# Patient Record
Sex: MALE | Race: WHITE | NOT HISPANIC OR LATINO | Employment: OTHER | URBAN - METROPOLITAN AREA
[De-identification: names, ages, dates, MRNs, and addresses within clinical notes are randomized per-mention and may not be internally consistent; named-entity substitution may affect disease eponyms.]

---

## 2023-04-13 ENCOUNTER — APPOINTMENT (OUTPATIENT)
Dept: LAB | Facility: LAB | Age: 53
End: 2023-04-13
Payer: COMMERCIAL

## 2023-04-13 LAB — CANCER AG 19-9 (U/ML) IN SER/PLAS: 8 U/ML

## 2023-10-14 PROBLEM — C96.9: Status: ACTIVE | Noted: 2023-04-04

## 2023-10-14 PROBLEM — R07.9 CHEST PAIN: Status: ACTIVE | Noted: 2023-10-14

## 2023-10-14 PROBLEM — E78.00 HIGH CHOLESTEROL: Status: ACTIVE | Noted: 2023-10-14

## 2023-10-14 PROBLEM — C25.9 PANCREAS CANCER (MULTI): Status: ACTIVE | Noted: 2023-10-14

## 2023-10-14 PROBLEM — R63.4 WEIGHT LOSS: Status: ACTIVE | Noted: 2023-10-14

## 2023-10-14 PROBLEM — J45.901 ASTHMA WITH ACUTE EXACERBATION (HHS-HCC): Status: ACTIVE | Noted: 2023-10-14

## 2023-10-14 PROBLEM — C44.92 SCC (SQUAMOUS CELL CARCINOMA): Status: ACTIVE | Noted: 2023-10-14

## 2023-10-14 PROBLEM — D70.1 CHEMOTHERAPY INDUCED NEUTROPENIA (CMS-HCC): Status: ACTIVE | Noted: 2023-10-14

## 2023-10-14 PROBLEM — G89.3 CANCER ASSOCIATED PAIN: Status: ACTIVE | Noted: 2023-04-04

## 2023-10-14 PROBLEM — J43.9 COPD TYPE A (MULTI): Status: ACTIVE | Noted: 2023-10-14

## 2023-10-14 PROBLEM — C78.7 METASTATIC CARCINOMA TO LIVER (MULTI): Status: ACTIVE | Noted: 2022-12-05

## 2023-10-14 PROBLEM — I81 PORTAL VEIN THROMBOSIS: Status: ACTIVE | Noted: 2023-04-04

## 2023-10-14 PROBLEM — C78.7 MALIGNANT NEOPLASM METASTATIC TO LIVER (MULTI): Status: ACTIVE | Noted: 2023-04-04

## 2023-10-14 PROBLEM — C25.1 MALIGNANT NEOPLASM OF BODY OF PANCREAS (MULTI): Status: ACTIVE | Noted: 2023-04-04

## 2023-10-14 PROBLEM — T45.1X5A CHEMOTHERAPY INDUCED NEUTROPENIA (CMS-HCC): Status: ACTIVE | Noted: 2023-10-14

## 2023-10-14 PROBLEM — G56.20 ULNAR NEUROPATHY: Status: ACTIVE | Noted: 2018-01-10

## 2023-10-14 RX ORDER — SELENIUM SULFIDE 2.5 MG/100ML
LOTION TOPICAL
COMMUNITY
Start: 2013-08-23

## 2023-10-14 RX ORDER — DOXYCYCLINE HYCLATE 50 MG/1
1 CAPSULE ORAL
COMMUNITY
Start: 2013-08-23

## 2023-10-14 RX ORDER — LOPERAMIDE HYDROCHLORIDE 2 MG/1
1 CAPSULE ORAL EVERY 4 HOURS PRN
COMMUNITY
Start: 2022-10-16

## 2023-10-14 RX ORDER — PREDNISONE 20 MG/1
3 TABLET ORAL DAILY
COMMUNITY
Start: 2014-02-27

## 2023-10-14 RX ORDER — PROCHLORPERAZINE MALEATE 10 MG
TABLET ORAL EVERY 6 HOURS PRN
COMMUNITY
Start: 2022-11-22

## 2023-10-14 RX ORDER — SIMVASTATIN 40 MG/1
TABLET, FILM COATED ORAL
COMMUNITY
Start: 2013-05-08

## 2023-10-14 RX ORDER — FLUTICASONE PROPIONATE AND SALMETEROL 250; 50 UG/1; UG/1
2 POWDER RESPIRATORY (INHALATION) DAILY
COMMUNITY
Start: 2022-09-29

## 2023-10-14 RX ORDER — ALBUTEROL SULFATE 90 UG/1
1 AEROSOL, METERED RESPIRATORY (INHALATION) 3 TIMES DAILY PRN
COMMUNITY

## 2023-10-14 RX ORDER — METRONIDAZOLE 500 MG/1
TABLET ORAL
COMMUNITY
Start: 2022-10-27

## 2023-10-14 RX ORDER — ADAPALENE AND BENZOYL PEROXIDE 3; 25 MG/G; MG/G
1 GEL TOPICAL DAILY
COMMUNITY
Start: 2023-02-07

## 2023-10-14 RX ORDER — FLUTICASONE PROPIONATE 50 MCG
SPRAY, SUSPENSION (ML) NASAL
COMMUNITY
Start: 2012-10-03

## 2023-10-14 RX ORDER — FLUTICASONE PROPIONATE AND SALMETEROL 50; 250 UG/1; UG/1
1 POWDER RESPIRATORY (INHALATION)
COMMUNITY

## 2023-10-14 RX ORDER — OMEPRAZOLE 20 MG/1
CAPSULE, DELAYED RELEASE ORAL
COMMUNITY
Start: 2012-11-15

## 2023-10-14 RX ORDER — HYDROXYZINE HYDROCHLORIDE 25 MG/1
1 TABLET, FILM COATED ORAL DAILY
COMMUNITY

## 2023-10-14 RX ORDER — APIXABAN 5 MG/1
1 TABLET, FILM COATED ORAL 2 TIMES DAILY
COMMUNITY

## 2023-10-14 RX ORDER — MINOCYCLINE HYDROCHLORIDE 50 MG/1
1 CAPSULE ORAL
COMMUNITY
Start: 2016-07-05

## 2023-10-14 RX ORDER — LISINOPRIL 5 MG/1
1 TABLET ORAL DAILY
COMMUNITY

## 2023-10-14 RX ORDER — MINERAL OIL
1 ENEMA (ML) RECTAL DAILY
COMMUNITY

## 2023-10-14 RX ORDER — ROSUVASTATIN CALCIUM 40 MG/1
1 TABLET, COATED ORAL DAILY
COMMUNITY

## 2023-10-14 RX ORDER — FENOFIBRATE 160 MG/1
1 TABLET ORAL DAILY
COMMUNITY

## 2023-10-14 RX ORDER — ESOMEPRAZOLE MAGNESIUM 40 MG/1
1 CAPSULE, DELAYED RELEASE ORAL DAILY
COMMUNITY

## 2023-10-16 ENCOUNTER — TELEMEDICINE (OUTPATIENT)
Dept: HEMATOLOGY/ONCOLOGY | Facility: HOSPITAL | Age: 53
End: 2023-10-16
Payer: COMMERCIAL

## 2023-10-16 DIAGNOSIS — C25.9 MALIGNANT NEOPLASM OF PANCREAS, UNSPECIFIED LOCATION OF MALIGNANCY (MULTI): Primary | ICD-10-CM

## 2023-10-16 PROCEDURE — 99213 OFFICE O/P EST LOW 20 MIN: CPT | Mod: 95 | Performed by: STUDENT IN AN ORGANIZED HEALTH CARE EDUCATION/TRAINING PROGRAM

## 2023-10-16 PROCEDURE — 99213 OFFICE O/P EST LOW 20 MIN: CPT | Performed by: STUDENT IN AN ORGANIZED HEALTH CARE EDUCATION/TRAINING PROGRAM

## 2023-10-25 NOTE — PROGRESS NOTES
Cancer History:  DIAGNOSIS: Pancreas cancer     STAGING: IV     CURRENT SITES OF DISEASE:  Pancreas, liver     MOLECULAR/GENOMICS:     CURRENT THERAPY:  FUDR via ABEL pump; planning to start Gemcitabine + Abraxane locally with Dr. Conway.       PREVIOUS THERAPY:  10/5/22--10/19/22: s/p 2 cycles FOLFIRINOX.  Discontinued due to intractable nausea vomiting diarrhea and colitis  11/16/22 --May 2023: Gemcitabine plus Abraxane day 1 day 15  7/10/23: S/p distal pancreatectomy + placement of Medtronic pump for ABEL      TUMOR MARKER:     9/2022: 661  11/2022: 101 (after 2 cycles FOLFIRINOX)     ONCOLOGIC ISSUES:     PROVIDERS:  Primary medical oncologist: Dr. Abram Tolentino at [facility]  Surgical oncologist: Dr. Norberto Lester     HISTORY:   8/2022: Developed mid epigastric pain and discomfort  9/2/22: CT abdomen pelvis without contrast showed mass in the pancreatic head measuring 2.2 cm with suspicious numerous lesions in the liver and a peripancreatic lymph node.    9/15/2022: MRI abdomen confirmed mass in pancreatic body, dilation of pancreatic duct distal to the mass, liver showed numerous lesions.  9/23/2022: Underwent EUS with biopsy, FNA showed adenocarcinoma of the liver and of the pancreas mass consistent with pancreatic primary.     PMH: HLD     FH: No family history of malignancy     SH: , lives with wife, works full-time.  No tobacco, EtOH, illicit     History of Present Illness:   Was hospitalized in [place] for bleeding in his abdomen, they removed a lot of blood while in [place], then they filled his pump wiht heparin saline x1 month, continues to deal with healing from the abscess in his abdomen currently with a drain placed. He is planned for chemotherapy locally with Dr. Conway on 10/30/23 if all goes well and is also scheduled at [place] for FUDR 10/23.      No fevers, chills, chest pain, shortness of breath, abdominal pain, nausea, vomiting, rashes or masses.      ROS as above.  Remainder of 10 point review of systems elicited and otherwise unremarkable.    Objective:  There were no vitals taken for this visit.     Physical exam deferred d/t virtual encounter    ECOG Performance Status: 1    Assessment & Plan:  Mr. Foley is a 52yoM with metastatic pancreatic adenocarcinoma to the liver diagnosed in September 2022.  He presents today for discussion of hepatic artery infusion  pump therapy for his liver metastases.     I extensively reviewed his excellent care by my colleague at [facility].  He was referred to me today by Dr. Lester.  Briefly, the patient was diagnosed in September 2022 with Denovo metastatic disease to the liver.  I have personally reviewed  the images of his prior MRI, which show multiple small masses throughout his liver.  He received 2 cycles of frontline chemotherapy with FOLFIRINOX, which were unfortunately complicated by colitis necessitating hospitalization.  Of note, there seems to  been a response to FOLFIRINOX as his baseline CA 19-9 of 661 down trended to 101.  His chemotherapy regimen was changed to gemcitabine plus Abraxane, which he is overall tolerating well on a day 1 day 15 schedule.  I have personally reviewed the images  of his recent PET CT and MRI liver, which show excellent response to lesions in his liver and minimal residual activity in the pancreas.     I discussed with the patient and his wife that hepatic artery infusion pump therapy is not a standard treatment for metastatic pancreatic adenocarcinoma, because in general this disease is very aggressive and time off of chemotherapy for surgical placement  of pump and resection of pancreas tumor will likely result in progression of disease.  Additionally, it is not well-established if intrahepatic chemotherapy is effective for this disease.  There is an ongoing clinical trial in [place] specifically for  hepatic artery infusion pump therapy for patients with metastatic pancreatic  adenocarcinoma to the liver, which could be considered if he is interested.     He recently met wiht Dr. Urbina in [place] about the clinical trial, and he would like to proceed with this. I have disucssed the option o fhaving systemic chemotherapy & heparin saline done here locally, and I am more than happy to manage this, but  we need to work with the clinical trial team at [place] to be sure this does not violate the protocol. I will keep working with the team for this.      7/24/23: Underwent distal pancreatectomy on 7/10/23 wt Dr. Azael Urbina in [place], and had placement of pump for ABEL chemotherapy at the same time. Based on the patient's description, a Medtronic pump was placed, which unfortunately requires a different  set up for access and programming than the Intera pump. I was not aware prior to his surgery that a Medtronic would be placed, and I will have to sort out whether or not there is a department here that has a Medtronic set up for other indications that  I could coordinate this with.      9/25/23: Recently admitted for prolonged period with intra-abd hematoma. Most recently had this drained at [place]. He is scheduled for FUDR via Medtronic ABEL Pump at [place] on 10/9/23 and is hoping to get his pump emptied/refilled with saline on 10/23  here. I am not sure that we will have the capability to manage his Medtronic pump, as we are not set up for this. He would like his local oncologist Dr. Conway at [facility] to manage his gem/Abraxane.     10/16/23: continues to have drain in abdominal abscess. Scheduled for FUDR via ABEL on 10/23 in [place] and gem/Abraxane on 10/30 at [facility] with Dr. Conway.  RTC approx 1 mo virtual visit

## 2023-12-29 ENCOUNTER — HOSPITAL ENCOUNTER (OUTPATIENT)
Dept: RADIOLOGY | Facility: EXTERNAL LOCATION | Age: 53
Discharge: HOME | End: 2023-12-29

## 2023-12-29 ENCOUNTER — ANCILLARY PROCEDURE (OUTPATIENT)
Dept: RADIOLOGY | Facility: CLINIC | Age: 53
End: 2023-12-29
Payer: COMMERCIAL

## 2023-12-29 DIAGNOSIS — R07.81 RIB PAIN: ICD-10-CM

## 2023-12-29 DIAGNOSIS — R07.82 INTERCOSTAL PAIN: ICD-10-CM

## 2023-12-29 PROCEDURE — 71111 X-RAY EXAM RIBS/CHEST4/> VWS: CPT | Performed by: STUDENT IN AN ORGANIZED HEALTH CARE EDUCATION/TRAINING PROGRAM

## 2023-12-29 PROCEDURE — 71111 X-RAY EXAM RIBS/CHEST4/> VWS: CPT

## 2024-01-13 ENCOUNTER — APPOINTMENT (OUTPATIENT)
Dept: RADIOLOGY | Facility: HOSPITAL | Age: 54
DRG: 271 | End: 2024-01-13
Payer: COMMERCIAL

## 2024-01-13 ENCOUNTER — HOSPITAL ENCOUNTER (INPATIENT)
Facility: HOSPITAL | Age: 54
LOS: 3 days | Discharge: HOME | DRG: 271 | End: 2024-01-16
Attending: SURGERY | Admitting: SURGERY
Payer: COMMERCIAL

## 2024-01-13 DIAGNOSIS — C25.9 MALIGNANT NEOPLASM OF PANCREAS, UNSPECIFIED LOCATION OF MALIGNANCY (MULTI): Primary | ICD-10-CM

## 2024-01-13 DIAGNOSIS — R10.9 ABDOMINAL PAIN, UNSPECIFIED ABDOMINAL LOCATION: ICD-10-CM

## 2024-01-13 LAB
ALBUMIN SERPL BCP-MCNC: 3.4 G/DL (ref 3.4–5)
ALP SERPL-CCNC: 76 U/L (ref 33–120)
ALT SERPL W P-5'-P-CCNC: 19 U/L (ref 10–52)
ANION GAP SERPL CALC-SCNC: 15 MMOL/L (ref 10–20)
AST SERPL W P-5'-P-CCNC: 23 U/L (ref 9–39)
BILIRUB SERPL-MCNC: 0.5 MG/DL (ref 0–1.2)
BUN SERPL-MCNC: 6 MG/DL (ref 6–23)
CALCIUM SERPL-MCNC: 9.2 MG/DL (ref 8.6–10.6)
CHLORIDE SERPL-SCNC: 105 MMOL/L (ref 98–107)
CO2 SERPL-SCNC: 24 MMOL/L (ref 21–32)
CREAT SERPL-MCNC: 0.56 MG/DL (ref 0.5–1.3)
EGFRCR SERPLBLD CKD-EPI 2021: >90 ML/MIN/1.73M*2
ERYTHROCYTE [DISTWIDTH] IN BLOOD BY AUTOMATED COUNT: 19 % (ref 11.5–14.5)
GLUCOSE BLD MANUAL STRIP-MCNC: 125 MG/DL (ref 74–99)
GLUCOSE BLD MANUAL STRIP-MCNC: 77 MG/DL (ref 74–99)
GLUCOSE SERPL-MCNC: 68 MG/DL (ref 74–99)
HCT VFR BLD AUTO: 28.2 % (ref 41–52)
HGB BLD-MCNC: 9.8 G/DL (ref 13.5–17.5)
LIPASE SERPL-CCNC: 4 U/L (ref 9–82)
MAGNESIUM SERPL-MCNC: 1.51 MG/DL (ref 1.6–2.4)
MCH RBC QN AUTO: 31.6 PG (ref 26–34)
MCHC RBC AUTO-ENTMCNC: 34.8 G/DL (ref 32–36)
MCV RBC AUTO: 91 FL (ref 80–100)
NRBC BLD-RTO: 0.1 /100 WBCS (ref 0–0)
PLATELET # BLD AUTO: 328 X10*3/UL (ref 150–450)
POTASSIUM SERPL-SCNC: 3.7 MMOL/L (ref 3.5–5.3)
PROT SERPL-MCNC: 5.9 G/DL (ref 6.4–8.2)
RBC # BLD AUTO: 3.1 X10*6/UL (ref 4.5–5.9)
SODIUM SERPL-SCNC: 140 MMOL/L (ref 136–145)
WBC # BLD AUTO: 15.2 X10*3/UL (ref 4.4–11.3)

## 2024-01-13 PROCEDURE — 2550000001 HC RX 255 CONTRASTS: Performed by: SURGERY

## 2024-01-13 PROCEDURE — A4216 STERILE WATER/SALINE, 10 ML: HCPCS | Performed by: STUDENT IN AN ORGANIZED HEALTH CARE EDUCATION/TRAINING PROGRAM

## 2024-01-13 PROCEDURE — 2500000004 HC RX 250 GENERAL PHARMACY W/ HCPCS (ALT 636 FOR OP/ED): Performed by: STUDENT IN AN ORGANIZED HEALTH CARE EDUCATION/TRAINING PROGRAM

## 2024-01-13 PROCEDURE — 83735 ASSAY OF MAGNESIUM: CPT

## 2024-01-13 PROCEDURE — 94760 N-INVAS EAR/PLS OXIMETRY 1: CPT

## 2024-01-13 PROCEDURE — 80053 COMPREHEN METABOLIC PANEL: CPT

## 2024-01-13 PROCEDURE — 99223 1ST HOSP IP/OBS HIGH 75: CPT | Performed by: SURGERY

## 2024-01-13 PROCEDURE — 74174 CTA ABD&PLVS W/CONTRAST: CPT

## 2024-01-13 PROCEDURE — 1170000001 HC PRIVATE ONCOLOGY ROOM DAILY

## 2024-01-13 PROCEDURE — 83690 ASSAY OF LIPASE: CPT

## 2024-01-13 PROCEDURE — 85027 COMPLETE CBC AUTOMATED: CPT

## 2024-01-13 PROCEDURE — 2500000004 HC RX 250 GENERAL PHARMACY W/ HCPCS (ALT 636 FOR OP/ED)

## 2024-01-13 PROCEDURE — 2500000001 HC RX 250 WO HCPCS SELF ADMINISTERED DRUGS (ALT 637 FOR MEDICARE OP): Performed by: STUDENT IN AN ORGANIZED HEALTH CARE EDUCATION/TRAINING PROGRAM

## 2024-01-13 PROCEDURE — 82947 ASSAY GLUCOSE BLOOD QUANT: CPT

## 2024-01-13 PROCEDURE — 99232 SBSQ HOSP IP/OBS MODERATE 35: CPT | Performed by: SURGERY

## 2024-01-13 PROCEDURE — 74174 CTA ABD&PLVS W/CONTRAST: CPT | Performed by: RADIOLOGY

## 2024-01-13 PROCEDURE — 2500000001 HC RX 250 WO HCPCS SELF ADMINISTERED DRUGS (ALT 637 FOR MEDICARE OP)

## 2024-01-13 PROCEDURE — 36415 COLL VENOUS BLD VENIPUNCTURE: CPT

## 2024-01-13 RX ORDER — AMOXICILLIN 250 MG
2 CAPSULE ORAL 2 TIMES DAILY
Status: DISCONTINUED | OUTPATIENT
Start: 2024-01-13 | End: 2024-01-16 | Stop reason: HOSPADM

## 2024-01-13 RX ORDER — HYDROMORPHONE HYDROCHLORIDE 1 MG/ML
0.2 INJECTION, SOLUTION INTRAMUSCULAR; INTRAVENOUS; SUBCUTANEOUS EVERY 4 HOURS PRN
Status: DISCONTINUED | OUTPATIENT
Start: 2024-01-13 | End: 2024-01-13

## 2024-01-13 RX ORDER — ONDANSETRON HYDROCHLORIDE 2 MG/ML
4 INJECTION, SOLUTION INTRAVENOUS EVERY 8 HOURS PRN
Status: DISCONTINUED | OUTPATIENT
Start: 2024-01-13 | End: 2024-01-16 | Stop reason: HOSPADM

## 2024-01-13 RX ORDER — SODIUM CHLORIDE, SODIUM LACTATE, POTASSIUM CHLORIDE, CALCIUM CHLORIDE 600; 310; 30; 20 MG/100ML; MG/100ML; MG/100ML; MG/100ML
100 INJECTION, SOLUTION INTRAVENOUS CONTINUOUS
Status: DISCONTINUED | OUTPATIENT
Start: 2024-01-13 | End: 2024-01-16

## 2024-01-13 RX ORDER — HYDROXYZINE HYDROCHLORIDE 25 MG/1
25 TABLET, FILM COATED ORAL DAILY
Status: DISCONTINUED | OUTPATIENT
Start: 2024-01-13 | End: 2024-01-16 | Stop reason: HOSPADM

## 2024-01-13 RX ORDER — PANTOPRAZOLE SODIUM 40 MG/1
40 TABLET, DELAYED RELEASE ORAL
Status: DISCONTINUED | OUTPATIENT
Start: 2024-01-14 | End: 2024-01-16 | Stop reason: HOSPADM

## 2024-01-13 RX ORDER — OXYCODONE HCL 10 MG/1
10 TABLET, FILM COATED, EXTENDED RELEASE ORAL EVERY 12 HOURS
COMMUNITY

## 2024-01-13 RX ORDER — ALBUTEROL SULFATE 90 UG/1
2 AEROSOL, METERED RESPIRATORY (INHALATION) 3 TIMES DAILY PRN
Status: DISCONTINUED | OUTPATIENT
Start: 2024-01-13 | End: 2024-01-16 | Stop reason: HOSPADM

## 2024-01-13 RX ORDER — ENOXAPARIN SODIUM 100 MG/ML
40 INJECTION SUBCUTANEOUS EVERY 24 HOURS
Status: DISCONTINUED | OUTPATIENT
Start: 2024-01-13 | End: 2024-01-16 | Stop reason: HOSPADM

## 2024-01-13 RX ORDER — POTASSIUM CHLORIDE 14.9 MG/ML
20 INJECTION INTRAVENOUS ONCE
Status: COMPLETED | OUTPATIENT
Start: 2024-01-13 | End: 2024-01-13

## 2024-01-13 RX ORDER — OXYCODONE HYDROCHLORIDE 5 MG/1
20 TABLET ORAL EVERY 4 HOURS PRN
COMMUNITY

## 2024-01-13 RX ORDER — ROSUVASTATIN CALCIUM 10 MG/1
40 TABLET, COATED ORAL DAILY
Status: DISCONTINUED | OUTPATIENT
Start: 2024-01-13 | End: 2024-01-16 | Stop reason: HOSPADM

## 2024-01-13 RX ORDER — TRAMADOL HYDROCHLORIDE 50 MG/1
50 TABLET ORAL EVERY 4 HOURS PRN
Status: DISCONTINUED | OUTPATIENT
Start: 2024-01-13 | End: 2024-01-13

## 2024-01-13 RX ORDER — AMLODIPINE BESYLATE 10 MG/1
10 TABLET ORAL DAILY
Status: DISCONTINUED | OUTPATIENT
Start: 2024-01-13 | End: 2024-01-16 | Stop reason: HOSPADM

## 2024-01-13 RX ORDER — MAGNESIUM SULFATE HEPTAHYDRATE 40 MG/ML
4 INJECTION, SOLUTION INTRAVENOUS ONCE
Status: COMPLETED | OUTPATIENT
Start: 2024-01-13 | End: 2024-01-13

## 2024-01-13 RX ORDER — LISINOPRIL 20 MG/1
40 TABLET ORAL DAILY
Status: DISCONTINUED | OUTPATIENT
Start: 2024-01-13 | End: 2024-01-16 | Stop reason: HOSPADM

## 2024-01-13 RX ORDER — OXYCODONE HYDROCHLORIDE 5 MG/1
15 TABLET ORAL EVERY 6 HOURS PRN
Status: DISCONTINUED | OUTPATIENT
Start: 2024-01-13 | End: 2024-01-16 | Stop reason: HOSPADM

## 2024-01-13 RX ORDER — OXYCODONE HYDROCHLORIDE 5 MG/1
10 TABLET ORAL EVERY 4 HOURS PRN
Status: DISCONTINUED | OUTPATIENT
Start: 2024-01-13 | End: 2024-01-16 | Stop reason: HOSPADM

## 2024-01-13 RX ORDER — FLUTICASONE FUROATE AND VILANTEROL 200; 25 UG/1; UG/1
1 POWDER RESPIRATORY (INHALATION) DAILY
Status: DISCONTINUED | OUTPATIENT
Start: 2024-01-13 | End: 2024-01-16 | Stop reason: HOSPADM

## 2024-01-13 RX ORDER — ACETAMINOPHEN 325 MG/1
650 TABLET ORAL EVERY 6 HOURS
Status: DISCONTINUED | OUTPATIENT
Start: 2024-01-13 | End: 2024-01-16 | Stop reason: HOSPADM

## 2024-01-13 RX ORDER — OXYCODONE HYDROCHLORIDE 5 MG/1
5 TABLET ORAL EVERY 4 HOURS PRN
Status: DISCONTINUED | OUTPATIENT
Start: 2024-01-13 | End: 2024-01-13

## 2024-01-13 RX ORDER — HYDROMORPHONE HYDROCHLORIDE 1 MG/ML
0.4 INJECTION, SOLUTION INTRAMUSCULAR; INTRAVENOUS; SUBCUTANEOUS EVERY 2 HOUR PRN
Status: DISCONTINUED | OUTPATIENT
Start: 2024-01-13 | End: 2024-01-16 | Stop reason: HOSPADM

## 2024-01-13 RX ADMIN — ACETAMINOPHEN 650 MG: 325 TABLET ORAL at 21:13

## 2024-01-13 RX ADMIN — HEPARIN SODIUM: 20000 INJECTION, SOLUTION INTRAVENOUS; SUBCUTANEOUS at 15:41

## 2024-01-13 RX ADMIN — LISINOPRIL 40 MG: 20 TABLET ORAL at 16:47

## 2024-01-13 RX ADMIN — SENNOSIDES AND DOCUSATE SODIUM 2 TABLET: 8.6; 5 TABLET ORAL at 21:13

## 2024-01-13 RX ADMIN — AMLODIPINE BESYLATE 10 MG: 10 TABLET ORAL at 16:47

## 2024-01-13 RX ADMIN — IOHEXOL 90 ML: 350 INJECTION, SOLUTION INTRAVENOUS at 18:35

## 2024-01-13 RX ADMIN — ACETAMINOPHEN 650 MG: 325 TABLET ORAL at 14:38

## 2024-01-13 RX ADMIN — OXYCODONE HYDROCHLORIDE 15 MG: 5 TABLET ORAL at 19:46

## 2024-01-13 RX ADMIN — HYDROMORPHONE HYDROCHLORIDE 0.4 MG: 1 INJECTION, SOLUTION INTRAMUSCULAR; INTRAVENOUS; SUBCUTANEOUS at 21:14

## 2024-01-13 RX ADMIN — SODIUM CHLORIDE, POTASSIUM CHLORIDE, SODIUM LACTATE AND CALCIUM CHLORIDE 100 ML/HR: 600; 310; 30; 20 INJECTION, SOLUTION INTRAVENOUS at 14:35

## 2024-01-13 RX ADMIN — MAGNESIUM SULFATE 4 G: 4 INJECTION INTRAVENOUS at 16:47

## 2024-01-13 RX ADMIN — ROSUVASTATIN 40 MG: 10 TABLET, FILM COATED ORAL at 21:13

## 2024-01-13 RX ADMIN — OXYCODONE HYDROCHLORIDE 5 MG: 5 TABLET ORAL at 14:39

## 2024-01-13 RX ADMIN — POTASSIUM CHLORIDE 20 MEQ: 14.9 INJECTION, SOLUTION INTRAVENOUS at 16:47

## 2024-01-13 RX ADMIN — HYDROMORPHONE HYDROCHLORIDE 0.4 MG: 1 INJECTION, SOLUTION INTRAMUSCULAR; INTRAVENOUS; SUBCUTANEOUS at 17:20

## 2024-01-13 SDOH — SOCIAL STABILITY: SOCIAL INSECURITY: ABUSE: ADULT

## 2024-01-13 SDOH — SOCIAL STABILITY: SOCIAL INSECURITY: HAS ANYONE EVER THREATENED TO HURT YOUR FAMILY OR YOUR PETS?: NO

## 2024-01-13 SDOH — SOCIAL STABILITY: SOCIAL INSECURITY: WERE YOU ABLE TO COMPLETE ALL THE BEHAVIORAL HEALTH SCREENINGS?: YES

## 2024-01-13 SDOH — SOCIAL STABILITY: SOCIAL INSECURITY: DO YOU FEEL UNSAFE GOING BACK TO THE PLACE WHERE YOU ARE LIVING?: NO

## 2024-01-13 SDOH — SOCIAL STABILITY: SOCIAL INSECURITY: HAVE YOU HAD THOUGHTS OF HARMING ANYONE ELSE?: NO

## 2024-01-13 SDOH — SOCIAL STABILITY: SOCIAL INSECURITY: DO YOU FEEL ANYONE HAS EXPLOITED OR TAKEN ADVANTAGE OF YOU FINANCIALLY OR OF YOUR PERSONAL PROPERTY?: NO

## 2024-01-13 SDOH — SOCIAL STABILITY: SOCIAL INSECURITY: ARE YOU OR HAVE YOU BEEN THREATENED OR ABUSED PHYSICALLY, EMOTIONALLY, OR SEXUALLY BY ANYONE?: NO

## 2024-01-13 SDOH — SOCIAL STABILITY: SOCIAL INSECURITY: ARE THERE ANY APPARENT SIGNS OF INJURIES/BEHAVIORS THAT COULD BE RELATED TO ABUSE/NEGLECT?: NO

## 2024-01-13 SDOH — SOCIAL STABILITY: SOCIAL INSECURITY: DOES ANYONE TRY TO KEEP YOU FROM HAVING/CONTACTING OTHER FRIENDS OR DOING THINGS OUTSIDE YOUR HOME?: NO

## 2024-01-13 ASSESSMENT — LIFESTYLE VARIABLES
HOW MANY STANDARD DRINKS CONTAINING ALCOHOL DO YOU HAVE ON A TYPICAL DAY: PATIENT DECLINED
HOW OFTEN DO YOU HAVE A DRINK CONTAINING ALCOHOL: PATIENT DECLINED
SKIP TO QUESTIONS 9-10: 0
HOW OFTEN DO YOU HAVE 6 OR MORE DRINKS ON ONE OCCASION: PATIENT DECLINED
AUDIT-C TOTAL SCORE: -1
PRESCIPTION_ABUSE_PAST_12_MONTHS: NO
AUDIT-C TOTAL SCORE: -1
SUBSTANCE_ABUSE_PAST_12_MONTHS: NO

## 2024-01-13 ASSESSMENT — COGNITIVE AND FUNCTIONAL STATUS - GENERAL
MOBILITY SCORE: 24
DAILY ACTIVITIY SCORE: 24
MOBILITY SCORE: 24
DAILY ACTIVITIY SCORE: 24
PATIENT BASELINE BEDBOUND: NO

## 2024-01-13 ASSESSMENT — PAIN - FUNCTIONAL ASSESSMENT
PAIN_FUNCTIONAL_ASSESSMENT: 0-10

## 2024-01-13 ASSESSMENT — ACTIVITIES OF DAILY LIVING (ADL)
ADEQUATE_TO_COMPLETE_ADL: YES
TOILETING: INDEPENDENT
WALKS IN HOME: INDEPENDENT
FEEDING YOURSELF: INDEPENDENT
PATIENT'S MEMORY ADEQUATE TO SAFELY COMPLETE DAILY ACTIVITIES?: YES
HEARING - RIGHT EAR: FUNCTIONAL
BATHING: INDEPENDENT
DRESSING YOURSELF: INDEPENDENT
GROOMING: INDEPENDENT
LACK_OF_TRANSPORTATION: PATIENT DECLINED
HEARING - LEFT EAR: FUNCTIONAL
JUDGMENT_ADEQUATE_SAFELY_COMPLETE_DAILY_ACTIVITIES: YES

## 2024-01-13 ASSESSMENT — ENCOUNTER SYMPTOMS
CHEST TIGHTNESS: 0
FATIGUE: 0
LIGHT-HEADEDNESS: 0
DIZZINESS: 0
CHILLS: 0
HEADACHES: 0
DIARRHEA: 0
BLOOD IN STOOL: 0
FEVER: 0
FREQUENCY: 0
VOMITING: 1
ABDOMINAL PAIN: 1
MYALGIAS: 0
WEAKNESS: 0
WHEEZING: 0
PALPITATIONS: 0
CONSTIPATION: 1
ABDOMINAL DISTENTION: 0
JOINT SWELLING: 0
NAUSEA: 1
APPETITE CHANGE: 0
TROUBLE SWALLOWING: 0
SHORTNESS OF BREATH: 0
HEMATURIA: 0
DYSURIA: 0
SORE THROAT: 0
COUGH: 0

## 2024-01-13 ASSESSMENT — PAIN SCALES - PAIN ASSESSMENT IN ADVANCED DEMENTIA (PAINAD)
BODYLANGUAGE: RELAXED
BREATHING: NORMAL
CONSOLABILITY: NO NEED TO CONSOLE
FACIALEXPRESSION: SMILING OR INEXPRESSIVE

## 2024-01-13 ASSESSMENT — COLUMBIA-SUICIDE SEVERITY RATING SCALE - C-SSRS
2. HAVE YOU ACTUALLY HAD ANY THOUGHTS OF KILLING YOURSELF?: NO
6. HAVE YOU EVER DONE ANYTHING, STARTED TO DO ANYTHING, OR PREPARED TO DO ANYTHING TO END YOUR LIFE?: NO
1. IN THE PAST MONTH, HAVE YOU WISHED YOU WERE DEAD OR WISHED YOU COULD GO TO SLEEP AND NOT WAKE UP?: NO

## 2024-01-13 ASSESSMENT — PATIENT HEALTH QUESTIONNAIRE - PHQ9
1. LITTLE INTEREST OR PLEASURE IN DOING THINGS: NOT AT ALL
SUM OF ALL RESPONSES TO PHQ9 QUESTIONS 1 & 2: 0
2. FEELING DOWN, DEPRESSED OR HOPELESS: NOT AT ALL

## 2024-01-13 ASSESSMENT — PAIN SCALES - GENERAL
PAINLEVEL_OUTOF10: 7
PAINLEVEL_OUTOF10: 10 - WORST POSSIBLE PAIN
PAINLEVEL_OUTOF10: 9
PAINLEVEL_OUTOF10: 6
PAINLEVEL_OUTOF10: 8

## 2024-01-13 ASSESSMENT — PAIN DESCRIPTION - LOCATION: LOCATION: ABDOMEN

## 2024-01-13 NOTE — Clinical Note
GI Embo done with R groin access; 4coils placed. Pt received 3mg versed and 150mcg fentanyl for sedation; tolerated well. VSS throughout procedure. Dressing is clean, dry, and intact. Pt to lay flat for 2hrs. Pt to RPCU; report to RPCU RN.

## 2024-01-13 NOTE — CARE PLAN
The patient's goals for the shift include  pain control    The clinical goals for the shift include patient will remain VSS until end of shift at 1900 on 1/13/2023    Over the shift, the patient did not make progress toward the following goals. Barriers to progression include pain. Recommendations to address these barriers include assess and treat pain as ordered.      Problem: Pain - Adult  Goal: Verbalizes/displays adequate comfort level or baseline comfort level  Outcome: Progressing     Problem: Safety - Adult  Goal: Free from fall injury  Outcome: Progressing     Problem: Discharge Planning  Goal: Discharge to home or other facility with appropriate resources  Outcome: Progressing     Problem: Chronic Conditions and Co-morbidities  Goal: Patient's chronic conditions and co-morbidity symptoms are monitored and maintained or improved  Outcome: Progressing     Problem: Fall/Injury  Goal: Not fall by end of shift  Outcome: Progressing  Goal: Be free from injury by end of the shift  Outcome: Progressing  Goal: Verbalize understanding of personal risk factors for fall in the hospital  Outcome: Progressing  Goal: Verbalize understanding of risk factor reduction measures to prevent injury from fall in the home  Outcome: Progressing  Goal: Use assistive devices by end of the shift  Outcome: Progressing  Goal: Pace activities to prevent fatigue by end of the shift  Outcome: Progressing     Problem: Pain  Goal: Takes deep breaths with improved pain control throughout the shift  Outcome: Progressing  Goal: Turns in bed with improved pain control throughout the shift  Outcome: Progressing  Goal: Walks with improved pain control throughout the shift  Outcome: Progressing  Goal: Performs ADL's with improved pain control throughout shift  Outcome: Progressing  Goal: Participates in PT with improved pain control throughout the shift  Outcome: Progressing  Goal: Free from opioid side effects throughout the shift  Outcome:  Progressing  Goal: Free from acute confusion related to pain meds throughout the shift  Outcome: Progressing

## 2024-01-13 NOTE — H&P
History Of Present Illness  Deshawn Foley is a 53 y.o. male presenting with PMH stage IV PDAC with liver metastasis (s/p folfirinox/gemcitabine/abraxane) s/p ABEL pump, partial pancreatectomy (2/3/23), cholecystectomy, total splenectomy (07/10/23 with Dr. Urbina at [facility]), currently undergoing PANC pump trial at [facility].     More recently, patient had his ABEL pump filled about 1 week ago with FUDR after a 1 month pause. On Thursday, patient started to experience nausea, vomiting, and increasing pain in a band-like formation across his mid-abdomen. Friday, patient presented to [facility] for which he underwent a CT A/P that showed findings concerning for pump slippage. Patient was subsequently transferred to [facility] for further workup and care.     Past Medical History  stage IV PDAC with liver metastasis (s/p folfirinox/gemcitabine/abraxane) s/p ABEL pump, partial pancreatectomy (2/3/23), cholecystectomy, total splenectomy (07/10/23 with Dr. Urbina at [facility])    HTN  HLD  asthma    Surgical History  ABEL pump, partial pancreatectomy (2/3/23)  Cholecystectomy  total splenectomy (07/10/23 with Dr. Urbina at [facility])    Past Surgical History:   Procedure Laterality Date    FL GUIDED TRANSVAGINAL TRANSRECTAL FLUID DRAIN  9/13/2023    FL GUIDED TRANSVAGINAL TRANSRECTAL FLUID DRAIN 9/13/2023    IR ANGIOGRAM INFERIOR EPIGASTRIC  8/28/2023    IR ANGIOGRAM INFERIOR EPIGASTRIC 8/28/2023 [facility] ANGIO    IR BODY DRAIN EXCHANGE  10/9/2023    IR BODY DRAIN EXCHANGE 10/9/2023        Social History  , lives with wife, no tobacco, alcohol, illicit drugs     Family History  None relevant     Allergies  Animal dander, House dust mite, Mold, Pollen extracts, Wool, Amoxicillin, Grass pollen, and House dust    Review of Systems   Constitutional:  Negative for appetite change, chills, fatigue and fever.   HENT:  Negative for congestion, hearing loss, sore throat and trouble swallowing.    Respiratory:   "Negative for cough, chest tightness, shortness of breath and wheezing.    Cardiovascular:  Negative for chest pain and palpitations.   Gastrointestinal:  Positive for abdominal pain, constipation, nausea and vomiting. Negative for abdominal distention, blood in stool and diarrhea.   Genitourinary:  Negative for dysuria, frequency, hematuria and urgency.   Musculoskeletal:  Negative for joint swelling and myalgias.   Neurological:  Negative for dizziness, syncope, weakness, light-headedness and headaches.        Physical Exam  Constitutional:       General: He is not in acute distress.     Appearance: Normal appearance. He is not ill-appearing, toxic-appearing or diaphoretic.   HENT:      Head: Normocephalic and atraumatic.   Eyes:      General: No scleral icterus.     Pupils: Pupils are equal, round, and reactive to light.   Cardiovascular:      Rate and Rhythm: Normal rate and regular rhythm.   Pulmonary:      Effort: Pulmonary effort is normal. No respiratory distress.      Breath sounds: Normal breath sounds. No wheezing.   Abdominal:      General: There is no distension.      Palpations: Abdomen is soft.      Tenderness: There is abdominal tenderness. There is no guarding.      Comments: ABEL pump in place without signs of overlying infection, no erythema, no tenderness, no warmth   Musculoskeletal:         General: Normal range of motion.   Skin:     General: Skin is warm and dry.      Coloration: Skin is not jaundiced.      Findings: No rash.   Neurological:      Mental Status: He is alert and oriented to person, place, and time. Mental status is at baseline.   Psychiatric:         Mood and Affect: Mood normal.         Behavior: Behavior normal.         Judgment: Judgment normal.          Last Recorded Vitals  Blood pressure (!) 150/94, pulse 76, temperature 37.4 °C (99.3 °F), temperature source Temporal, resp. rate 16, height 1.727 m (5' 8\"), weight 65.8 kg (145 lb 1 oz), SpO2 99 %.    Relevant Results    CBC, " CMP, Mag, Lipase pending     Assessment/Plan   Active Problems:  There are no active Hospital Problems.    Deshawn Foley is a 53 y.o. male presenting with PMH stage IV PDAC with liver metastasis (s/p folfirinox/gemcitabine/abraxane) s/p ABEL pump, partial pancreatectomy (2/3/23), cholecystectomy, total splenectomy (07/10/23 with Dr. Urbina at [facility]), currently undergoing PANC pump trial at [facility]. Patient presented to SW for abdominal pain and vomiting. CT A/P that showed findings concerning for pump slippage. Patient was subsequently transferred to [facility] for further workup and care.     will come interrogate device, draw back the chemo, and replace with hep saline.    Plan    Neuro:   - pain control with tylenol. Tramadol, oxy, dilaudid PRN  - continue home hydroxyzine    CV/pulm:  - continue home advair, albuterol  - continue home amlodipine, lisinopril, rosuvastatin  - IS  - OOB as tolerated    GI:  - CLD  - PPI    :   - pending CMP  - voiding independently   - daily labs, replete lytes PRN  - I&Os    Endo:  - no indication for sliding scale insulin    Heme:  - pending CBC  - Hgb stable, no indications for transfusions    ID:  - no indications for abx    DVT ppx:  - lovenox   - SCDs in place    Dispo: continue management on RNF    Discussed with attending Dr. Beti Meyers MD  General Surgery PGY2  Surgical oncology [id number]

## 2024-01-14 LAB
ALBUMIN SERPL BCP-MCNC: 3.5 G/DL (ref 3.4–5)
ALP SERPL-CCNC: 79 U/L (ref 33–120)
ALT SERPL W P-5'-P-CCNC: 18 U/L (ref 10–52)
ANION GAP SERPL CALC-SCNC: 11 MMOL/L (ref 10–20)
AST SERPL W P-5'-P-CCNC: 20 U/L (ref 9–39)
BILIRUB SERPL-MCNC: 0.5 MG/DL (ref 0–1.2)
BUN SERPL-MCNC: 4 MG/DL (ref 6–23)
CALCIUM SERPL-MCNC: 9.3 MG/DL (ref 8.6–10.6)
CHLORIDE SERPL-SCNC: 103 MMOL/L (ref 98–107)
CO2 SERPL-SCNC: 28 MMOL/L (ref 21–32)
CREAT SERPL-MCNC: 0.57 MG/DL (ref 0.5–1.3)
EGFRCR SERPLBLD CKD-EPI 2021: >90 ML/MIN/1.73M*2
ERYTHROCYTE [DISTWIDTH] IN BLOOD BY AUTOMATED COUNT: 19 % (ref 11.5–14.5)
GLUCOSE BLD MANUAL STRIP-MCNC: 108 MG/DL (ref 74–99)
GLUCOSE BLD MANUAL STRIP-MCNC: 114 MG/DL (ref 74–99)
GLUCOSE SERPL-MCNC: 116 MG/DL (ref 74–99)
HCT VFR BLD AUTO: 29.5 % (ref 41–52)
HGB BLD-MCNC: 10.1 G/DL (ref 13.5–17.5)
MAGNESIUM SERPL-MCNC: 1.96 MG/DL (ref 1.6–2.4)
MCH RBC QN AUTO: 31.9 PG (ref 26–34)
MCHC RBC AUTO-ENTMCNC: 34.2 G/DL (ref 32–36)
MCV RBC AUTO: 93 FL (ref 80–100)
NRBC BLD-RTO: 0 /100 WBCS (ref 0–0)
PLATELET # BLD AUTO: 728 X10*3/UL (ref 150–450)
POTASSIUM SERPL-SCNC: 3.2 MMOL/L (ref 3.5–5.3)
PROT SERPL-MCNC: 6.1 G/DL (ref 6.4–8.2)
RBC # BLD AUTO: 3.17 X10*6/UL (ref 4.5–5.9)
SODIUM SERPL-SCNC: 139 MMOL/L (ref 136–145)
WBC # BLD AUTO: 13.1 X10*3/UL (ref 4.4–11.3)

## 2024-01-14 PROCEDURE — 2500000001 HC RX 250 WO HCPCS SELF ADMINISTERED DRUGS (ALT 637 FOR MEDICARE OP)

## 2024-01-14 PROCEDURE — 82947 ASSAY GLUCOSE BLOOD QUANT: CPT

## 2024-01-14 PROCEDURE — 2500000005 HC RX 250 GENERAL PHARMACY W/O HCPCS

## 2024-01-14 PROCEDURE — 99233 SBSQ HOSP IP/OBS HIGH 50: CPT | Performed by: SURGERY

## 2024-01-14 PROCEDURE — 2500000004 HC RX 250 GENERAL PHARMACY W/ HCPCS (ALT 636 FOR OP/ED): Performed by: STUDENT IN AN ORGANIZED HEALTH CARE EDUCATION/TRAINING PROGRAM

## 2024-01-14 PROCEDURE — 2500000004 HC RX 250 GENERAL PHARMACY W/ HCPCS (ALT 636 FOR OP/ED)

## 2024-01-14 PROCEDURE — 1170000001 HC PRIVATE ONCOLOGY ROOM DAILY

## 2024-01-14 PROCEDURE — 2500000001 HC RX 250 WO HCPCS SELF ADMINISTERED DRUGS (ALT 637 FOR MEDICARE OP): Performed by: STUDENT IN AN ORGANIZED HEALTH CARE EDUCATION/TRAINING PROGRAM

## 2024-01-14 RX ORDER — OXYCODONE HYDROCHLORIDE 5 MG/1
20 TABLET ORAL EVERY 4 HOURS
Status: DISCONTINUED | OUTPATIENT
Start: 2024-01-14 | End: 2024-01-15

## 2024-01-14 RX ORDER — PROCHLORPERAZINE EDISYLATE 5 MG/ML
10 INJECTION INTRAMUSCULAR; INTRAVENOUS EVERY 6 HOURS PRN
Status: DISCONTINUED | OUTPATIENT
Start: 2024-01-14 | End: 2024-01-16 | Stop reason: HOSPADM

## 2024-01-14 RX ADMIN — OXYCODONE HYDROCHLORIDE 20 MG: 5 TABLET ORAL at 19:30

## 2024-01-14 RX ADMIN — ONDANSETRON 4 MG: 2 INJECTION INTRAMUSCULAR; INTRAVENOUS at 03:08

## 2024-01-14 RX ADMIN — ONDANSETRON 4 MG: 2 INJECTION INTRAMUSCULAR; INTRAVENOUS at 15:42

## 2024-01-14 RX ADMIN — SENNOSIDES AND DOCUSATE SODIUM 2 TABLET: 8.6; 5 TABLET ORAL at 09:13

## 2024-01-14 RX ADMIN — HYDROMORPHONE HYDROCHLORIDE 0.4 MG: 1 INJECTION, SOLUTION INTRAMUSCULAR; INTRAVENOUS; SUBCUTANEOUS at 03:14

## 2024-01-14 RX ADMIN — PANTOPRAZOLE SODIUM 40 MG: 40 TABLET, DELAYED RELEASE ORAL at 06:48

## 2024-01-14 RX ADMIN — ACETAMINOPHEN 650 MG: 325 TABLET ORAL at 09:13

## 2024-01-14 RX ADMIN — HYDROXYZINE HYDROCHLORIDE 25 MG: 25 TABLET ORAL at 09:13

## 2024-01-14 RX ADMIN — LISINOPRIL 40 MG: 20 TABLET ORAL at 09:12

## 2024-01-14 RX ADMIN — HYDROMORPHONE HYDROCHLORIDE 0.4 MG: 1 INJECTION, SOLUTION INTRAMUSCULAR; INTRAVENOUS; SUBCUTANEOUS at 13:23

## 2024-01-14 RX ADMIN — ACETAMINOPHEN 650 MG: 325 TABLET ORAL at 01:15

## 2024-01-14 RX ADMIN — ACETAMINOPHEN 650 MG: 325 TABLET ORAL at 14:45

## 2024-01-14 RX ADMIN — Medication: at 08:00

## 2024-01-14 RX ADMIN — HYDROMORPHONE HYDROCHLORIDE 0.4 MG: 1 INJECTION, SOLUTION INTRAMUSCULAR; INTRAVENOUS; SUBCUTANEOUS at 10:57

## 2024-01-14 RX ADMIN — AMLODIPINE BESYLATE 10 MG: 10 TABLET ORAL at 09:13

## 2024-01-14 RX ADMIN — OXYCODONE HYDROCHLORIDE 15 MG: 5 TABLET ORAL at 08:58

## 2024-01-14 RX ADMIN — OXYCODONE HYDROCHLORIDE 15 MG: 5 TABLET ORAL at 01:13

## 2024-01-14 RX ADMIN — OXYCODONE HYDROCHLORIDE 20 MG: 5 TABLET ORAL at 15:39

## 2024-01-14 RX ADMIN — ACETAMINOPHEN 650 MG: 325 TABLET ORAL at 19:30

## 2024-01-14 ASSESSMENT — COGNITIVE AND FUNCTIONAL STATUS - GENERAL
MOBILITY SCORE: 24
DAILY ACTIVITIY SCORE: 24
MOBILITY SCORE: 24
DAILY ACTIVITIY SCORE: 24
MOBILITY SCORE: 24
DAILY ACTIVITIY SCORE: 24

## 2024-01-14 ASSESSMENT — PAIN - FUNCTIONAL ASSESSMENT
PAIN_FUNCTIONAL_ASSESSMENT: 0-10

## 2024-01-14 ASSESSMENT — PAIN DESCRIPTION - LOCATION
LOCATION: BACK
LOCATION: ABDOMEN

## 2024-01-14 ASSESSMENT — PAIN SCALES - GENERAL
PAINLEVEL_OUTOF10: 8
PAINLEVEL_OUTOF10: 8
PAINLEVEL_OUTOF10: 7
PAINLEVEL_OUTOF10: 6
PAINLEVEL_OUTOF10: 7
PAINLEVEL_OUTOF10: 7
PAINLEVEL_OUTOF10: 6

## 2024-01-14 ASSESSMENT — PAIN SCALES - WONG BAKER: WONGBAKER_NUMERICALRESPONSE: NO HURT

## 2024-01-14 NOTE — CONSULTS
Subjective     Reason For Consult  Assistance with [employer] drug delivery system   History Of Present Illness  Deshawn Foley is a 53 y.o. male presenting with No chief complaint on file..     53-year-old male with stage IV pancreatic cancer with liver metastasis who has had a hepatic artery pump implanted that is delivering chemotherapy who is admitted after having found to have nausea and vomiting/abdominal pain concerning for catheter migration.    We were asked to assist with reprogramming the device.    He feels he is having some nausea and abdominal pain, but is otherwise in good spirits.  He is having significant trouble with constipation as well as abdominal pain and is scheduled to get a celiac plexus block next week with his pain doctor.  He is hoping to reduce the amount of opioids he needs to take.     Past Medical History  He has no past medical history on file.    Surgical History  He has a past surgical history that includes IR angiogram inferior epigastric (8/28/2023); IR body drain exchange (10/9/2023); and FL guided transvaginal transrectal fluid drain (9/13/2023).     Social History  He reports that he has never smoked. He has never used smokeless tobacco. No history on file for alcohol use and drug use.    Family History  No family history on file.     Allergies  Animal dander, House dust mite, Mold, Pollen extracts, Wool, Amoxicillin, Grass pollen, and House dust    Review of Systems  13-point ROS done and negative except as above.    Objective      Physical Exam  General: NAD, well groomed, well nourished  Eyes: Non-icteric sclera, EOMI  Ears, Nose, Mouth, and Throat: External ears and nose appear to be without deformity or rash. No lesions or masses noted. Hearing is grossly intact.   Neck: Trachea midline  Respiratory: Nonlabored breathing   Cardiovascular: no peripheral edema   Skin: No rashes or open lesions/ulcers identified on skin.  Abdomen: Right upper quadrant pump incision well-healed,  "clean, dry, intact.    Psychiatric: Alert, orientation to person, place, and time. Cooperative.         Last Recorded Vitals  Blood pressure 143/87, pulse 77, temperature 37 °C (98.6 °F), temperature source Temporal, resp. rate 16, height 1.727 m (5' 8\"), weight 65.8 kg (145 lb 1 oz), SpO2 100 %.    Relevant Results       Assessment/Plan   His pump was interrogated at bedside and showed:  Reservoir 20 mL, residual 15.7 mL  Drug: FUDR 4 mg / 1 mL  Dose: 3.9988 mg/day = 1 mL/day continuous infusion  PTM disabled.  No pump stalls on interrogation.    Our surgical oncology colleagues aspirated the chemotherapy from the reservoir using sterile technique at bedside.  They removed approximately 16 mL of chemotherapy solution, which was appropriately disposed of.  They replaced this with 20 mL of heparinized saline (1000 units/mL)    Pump was reprogrammed to deliver 1 mL/day of the heparinized saline solution per the surgical oncology team.    This will need to be refilled in approximately 20 days.     Recommendations:   -We discussed celiac plexus block and what to expect and gave him contact information for  pain doctors, should he wish to follow-up with us.    I spent 30 minutes in the professional and overall care of this patient.    Hesham Randall MD  Chronic Pain Fellow     This note was generated with the aid of dictation software, there may be typographical errors despite my attempts at proofreading.     "

## 2024-01-14 NOTE — CARE PLAN
The patient's goals for the shift include  maintaining safety and free of harm, having pain controlled.    The clinical goals for the shift include Patient will rate pain <6/10 by end of shift and continue to participate in plan of care.

## 2024-01-14 NOTE — CARE PLAN
Problem: Pain - Adult  Goal: Verbalizes/displays adequate comfort level or baseline comfort level  Outcome: Progressing     Problem: Safety - Adult  Goal: Free from fall injury  Outcome: Progressing     Problem: Discharge Planning  Goal: Discharge to home or other facility with appropriate resources  Outcome: Progressing     Problem: Chronic Conditions and Co-morbidities  Goal: Patient's chronic conditions and co-morbidity symptoms are monitored and maintained or improved  Outcome: Progressing     Problem: Fall/Injury  Goal: Not fall by end of shift  Outcome: Progressing  Goal: Be free from injury by end of the shift  Outcome: Progressing  Goal: Verbalize understanding of personal risk factors for fall in the hospital  Outcome: Progressing  Goal: Verbalize understanding of risk factor reduction measures to prevent injury from fall in the home  Outcome: Progressing  Goal: Use assistive devices by end of the shift  Outcome: Progressing  Goal: Pace activities to prevent fatigue by end of the shift  Outcome: Progressing     Problem: Pain  Goal: Takes deep breaths with improved pain control throughout the shift  Outcome: Progressing  Goal: Turns in bed with improved pain control throughout the shift  Outcome: Progressing  Goal: Walks with improved pain control throughout the shift  Outcome: Progressing  Goal: Performs ADL's with improved pain control throughout shift  Outcome: Progressing  Goal: Participates in PT with improved pain control throughout the shift  Outcome: Progressing  Goal: Free from opioid side effects throughout the shift  Outcome: Progressing  Goal: Free from acute confusion related to pain meds throughout the shift  Outcome: Progressing       The patient's goals for the shift include  Rest    The clinical goals for the shift include Patient will rate pain <6/10 by end of shift    Over the shift, the patient did make progress toward the following goals.

## 2024-01-14 NOTE — PROGRESS NOTES
"Deshawn Foley is a 53 y.o. male on day 1 of admission presenting with Malignant neoplasm of pancreas, unspecified location of malignancy (CMS/HCC).    Subjective   NAEO. Patient endorsing insomnia due to pain which has perpetuated from home. Some nausea. Patient denies V, fevers, chills, chest pain, and shortness of breath.    Objective     Physical Exam  Constitutional:       General: He is not in acute distress.     Appearance: Normal appearance. He is not ill-appearing, toxic-appearing or diaphoretic.   HENT:      Head: Normocephalic and atraumatic.   Eyes:      General: No scleral icterus.     Pupils: Pupils are equal, round, and reactive to light.   Cardiovascular:      Rate and Rhythm: Normal rate and regular rhythm.   Pulmonary:      Effort: Pulmonary effort is normal. No respiratory distress.      Breath sounds: Normal breath sounds. No wheezing.   Abdominal:      General: There is no distension.      Palpations: Abdomen is soft.      Tenderness: There is abdominal tenderness. There is no guarding.      Comments: ABEL pump in place without signs of overlying infection, no erythema, no tenderness, no warmth    Musculoskeletal:         General: Normal range of motion.   Skin:     General: Skin is warm and dry.      Coloration: Skin is not jaundiced.      Findings: No rash.   Neurological:      Mental Status: He is alert and oriented to person, place, and time. Mental status is at baseline.   Psychiatric:         Mood and Affect: Mood normal.         Behavior: Behavior normal.         Judgment: Judgment normal.         Last Recorded Vitals  Blood pressure 143/87, pulse 77, temperature 37 °C (98.6 °F), temperature source Temporal, resp. rate 16, height 1.727 m (5' 8\"), weight 65.8 kg (145 lb 1 oz), SpO2 100 %.  Intake/Output last 3 Shifts:  I/O last 3 completed shifts:  In: 1580 (24 mL/kg) [P.O.:270; I.V.:1210 (18.4 mL/kg); IV Piggyback:100]  Out: 400 (6.1 mL/kg) [Urine:400 (0.2 mL/kg/hr)]  Weight: 65.8 kg "     Relevant Results  Results for orders placed or performed during the hospital encounter of 01/13/24 (from the past 24 hour(s))   CBC   Result Value Ref Range    WBC 15.2 (H) 4.4 - 11.3 x10*3/uL    nRBC 0.1 (H) 0.0 - 0.0 /100 WBCs    RBC 3.10 (L) 4.50 - 5.90 x10*6/uL    Hemoglobin 9.8 (L) 13.5 - 17.5 g/dL    Hematocrit 28.2 (L) 41.0 - 52.0 %    MCV 91 80 - 100 fL    MCH 31.6 26.0 - 34.0 pg    MCHC 34.8 32.0 - 36.0 g/dL    RDW 19.0 (H) 11.5 - 14.5 %    Platelets 328 150 - 450 x10*3/uL   Magnesium   Result Value Ref Range    Magnesium 1.51 (L) 1.60 - 2.40 mg/dL   Comprehensive Metabolic Panel   Result Value Ref Range    Glucose 68 (L) 74 - 99 mg/dL    Sodium 140 136 - 145 mmol/L    Potassium 3.7 3.5 - 5.3 mmol/L    Chloride 105 98 - 107 mmol/L    Bicarbonate 24 21 - 32 mmol/L    Anion Gap 15 10 - 20 mmol/L    Urea Nitrogen 6 6 - 23 mg/dL    Creatinine 0.56 0.50 - 1.30 mg/dL    eGFR >90 >60 mL/min/1.73m*2    Calcium 9.2 8.6 - 10.6 mg/dL    Albumin 3.4 3.4 - 5.0 g/dL    Alkaline Phosphatase 76 33 - 120 U/L    Total Protein 5.9 (L) 6.4 - 8.2 g/dL    AST 23 9 - 39 U/L    Bilirubin, Total 0.5 0.0 - 1.2 mg/dL    ALT 19 10 - 52 U/L   POCT GLUCOSE   Result Value Ref Range    POCT Glucose 77 74 - 99 mg/dL   Lipase   Result Value Ref Range    Lipase 4 (L) 9 - 82 U/L   POCT GLUCOSE   Result Value Ref Range    POCT Glucose 125 (H) 74 - 99 mg/dL   CBC   Result Value Ref Range    WBC 13.1 (H) 4.4 - 11.3 x10*3/uL    nRBC 0.0 0.0 - 0.0 /100 WBCs    RBC 3.17 (L) 4.50 - 5.90 x10*6/uL    Hemoglobin 10.1 (L) 13.5 - 17.5 g/dL    Hematocrit 29.5 (L) 41.0 - 52.0 %    MCV 93 80 - 100 fL    MCH 31.9 26.0 - 34.0 pg    MCHC 34.2 32.0 - 36.0 g/dL    RDW 19.0 (H) 11.5 - 14.5 %    Platelets 728 (H) 150 - 450 x10*3/uL   Comprehensive Metabolic Panel   Result Value Ref Range    Glucose 116 (H) 74 - 99 mg/dL    Sodium 139 136 - 145 mmol/L    Potassium 3.2 (L) 3.5 - 5.3 mmol/L    Chloride 103 98 - 107 mmol/L    Bicarbonate 28 21 - 32 mmol/L    Anion  Gap 11 10 - 20 mmol/L    Urea Nitrogen 4 (L) 6 - 23 mg/dL    Creatinine 0.57 0.50 - 1.30 mg/dL    eGFR >90 >60 mL/min/1.73m*2    Calcium 9.3 8.6 - 10.6 mg/dL    Albumin 3.5 3.4 - 5.0 g/dL    Alkaline Phosphatase 79 33 - 120 U/L    Total Protein 6.1 (L) 6.4 - 8.2 g/dL    AST 20 9 - 39 U/L    Bilirubin, Total 0.5 0.0 - 1.2 mg/dL    ALT 18 10 - 52 U/L   Magnesium   Result Value Ref Range    Magnesium 1.96 1.60 - 2.40 mg/dL   POCT GLUCOSE   Result Value Ref Range    POCT Glucose 114 (H) 74 - 99 mg/dL   POCT GLUCOSE   Result Value Ref Range    POCT Glucose 108 (H) 74 - 99 mg/dL     Imaging  CT angio abdomen pelvis 1/13/24  1. Interval slight retraction of the hepatic artery infusion pump   catheter with tip now approximately 5 mm from the origin of the   gastroduodenal artery, previously at the origin compared to CT   08/28/2023.   2. Findings suggestive of a 9.7 cm hematoma in the lesser sac at the   pancreatectomy bed surrounding the distal hepatic artery infusion   pump catheter. No evidence of active bleeding.   3. New diffuse mural thickening of proximal small bowel loops and the   entirety of the colon. Please correlate clinically with enterocolitis.   4. Interval worsening hepatic metastases with new and enlarging   lesions throughout the liver.   5. New cluster of nodular opacities in the inferior left upper lobe   with ground-glass tree-in-bud opacities in the left lower lobe.   Complete correlate clinically with infectious process.   6. Interval significantly improved subcapsular hepatic hematoma and   hemoperitoneum.   7. Trace bilateral pleural effusions and associated atelectasis.     Assessment/Plan   Principal Problem:    Malignant neoplasm of pancreas, unspecified location of malignancy (CMS/HCC)    Deshawn Foley is a 53 y.o. male presenting with PMH stage IV PDAC with liver metastasis (s/p folfirinox/gemcitabine/abraxane) s/p ABEL pump, partial pancreatectomy (2/3/23), cholecystectomy, total splenectomy  (07/10/23 with Dr. Urbina at [facility]), currently undergoing PANC pump trial at [facility]. Patient presented to [facility] [place] for abdominal pain and vomiting. CT A/P that showed findings concerning for pump slippage. Patient was subsequently transferred to [facility] [place] for further workup and care. ABEL pump accessed, FUDR emptied, refilled with 20cc hep saline with 1cc/day flow rate.      Plan     Neuro:   - pain control with tylenol and home oxycodone. Conitnue oxy, dilaudid PRN  - continue home hydroxyzine  - consult supportive onc on Monday for pain management     CV/pulm:  - continue home advair, albuterol  - continue home amlodipine, lisinopril, rosuvastatin  - IS  - OOB as tolerated     GI:  - CLD  - NPO after MN  - CTA done, IR consult for embolization Mon 1/15  - PPI     :   - voiding independently   - daily labs, replete lytes PRN  - I&Os     Endo:  - no indication for sliding scale insulin     Heme:  - pending CBC  - Hgb stable, no indications for transfusions     ID:  - no indications for abx     DVT ppx:  - lovenox   - SCDs in place     Dispo: continue management on RNF     Discussed with attending Dr. Beti Meyers MD  General Surgery PGY2  Surgical oncology [id number]

## 2024-01-15 ENCOUNTER — APPOINTMENT (OUTPATIENT)
Dept: RADIOLOGY | Facility: HOSPITAL | Age: 54
DRG: 271 | End: 2024-01-15
Payer: COMMERCIAL

## 2024-01-15 LAB
ALBUMIN SERPL BCP-MCNC: 3.3 G/DL (ref 3.4–5)
ALP SERPL-CCNC: 85 U/L (ref 33–120)
ALT SERPL W P-5'-P-CCNC: 16 U/L (ref 10–52)
ANION GAP SERPL CALC-SCNC: 13 MMOL/L (ref 10–20)
AST SERPL W P-5'-P-CCNC: 18 U/L (ref 9–39)
BILIRUB SERPL-MCNC: 0.5 MG/DL (ref 0–1.2)
BUN SERPL-MCNC: 7 MG/DL (ref 6–23)
CALCIUM SERPL-MCNC: 9.5 MG/DL (ref 8.6–10.6)
CHLORIDE SERPL-SCNC: 103 MMOL/L (ref 98–107)
CO2 SERPL-SCNC: 27 MMOL/L (ref 21–32)
CREAT SERPL-MCNC: 0.72 MG/DL (ref 0.5–1.3)
EGFRCR SERPLBLD CKD-EPI 2021: >90 ML/MIN/1.73M*2
ERYTHROCYTE [DISTWIDTH] IN BLOOD BY AUTOMATED COUNT: 19.3 % (ref 11.5–14.5)
GLUCOSE BLD MANUAL STRIP-MCNC: 105 MG/DL (ref 74–99)
GLUCOSE BLD MANUAL STRIP-MCNC: 114 MG/DL (ref 74–99)
GLUCOSE BLD MANUAL STRIP-MCNC: 94 MG/DL (ref 74–99)
GLUCOSE SERPL-MCNC: 87 MG/DL (ref 74–99)
HCT VFR BLD AUTO: 30.2 % (ref 41–52)
HGB BLD-MCNC: 10.5 G/DL (ref 13.5–17.5)
MAGNESIUM SERPL-MCNC: 1.63 MG/DL (ref 1.6–2.4)
MCH RBC QN AUTO: 32.1 PG (ref 26–34)
MCHC RBC AUTO-ENTMCNC: 34.8 G/DL (ref 32–36)
MCV RBC AUTO: 92 FL (ref 80–100)
NRBC BLD-RTO: 0 /100 WBCS (ref 0–0)
PLATELET # BLD AUTO: 675 X10*3/UL (ref 150–450)
POTASSIUM SERPL-SCNC: 4 MMOL/L (ref 3.5–5.3)
PROT SERPL-MCNC: 5.6 G/DL (ref 6.4–8.2)
RBC # BLD AUTO: 3.27 X10*6/UL (ref 4.5–5.9)
SODIUM SERPL-SCNC: 139 MMOL/L (ref 136–145)
WBC # BLD AUTO: 13.6 X10*3/UL (ref 4.4–11.3)

## 2024-01-15 PROCEDURE — 80053 COMPREHEN METABOLIC PANEL: CPT

## 2024-01-15 PROCEDURE — 2720000007 HC OR 272 NO HCPCS

## 2024-01-15 PROCEDURE — 83735 ASSAY OF MAGNESIUM: CPT | Performed by: NURSE PRACTITIONER

## 2024-01-15 PROCEDURE — C1769 GUIDE WIRE: HCPCS

## 2024-01-15 PROCEDURE — 36245 INS CATH ABD/L-EXT ART 1ST: CPT | Performed by: STUDENT IN AN ORGANIZED HEALTH CARE EDUCATION/TRAINING PROGRAM

## 2024-01-15 PROCEDURE — 99153 MOD SED SAME PHYS/QHP EA: CPT

## 2024-01-15 PROCEDURE — 82947 ASSAY GLUCOSE BLOOD QUANT: CPT

## 2024-01-15 PROCEDURE — 36247 INS CATH ABD/L-EXT ART 3RD: CPT | Performed by: STUDENT IN AN ORGANIZED HEALTH CARE EDUCATION/TRAINING PROGRAM

## 2024-01-15 PROCEDURE — B4121ZZ FLUOROSCOPY OF HEPATIC ARTERY USING LOW OSMOLAR CONTRAST: ICD-10-PCS | Performed by: STUDENT IN AN ORGANIZED HEALTH CARE EDUCATION/TRAINING PROGRAM

## 2024-01-15 PROCEDURE — C1894 INTRO/SHEATH, NON-LASER: HCPCS

## 2024-01-15 PROCEDURE — 2500000004 HC RX 250 GENERAL PHARMACY W/ HCPCS (ALT 636 FOR OP/ED)

## 2024-01-15 PROCEDURE — 75774 ARTERY X-RAY EACH VESSEL: CPT | Performed by: STUDENT IN AN ORGANIZED HEALTH CARE EDUCATION/TRAINING PROGRAM

## 2024-01-15 PROCEDURE — C1889 IMPLANT/INSERT DEVICE, NOC: HCPCS

## 2024-01-15 PROCEDURE — 1170000001 HC PRIVATE ONCOLOGY ROOM DAILY

## 2024-01-15 PROCEDURE — 85027 COMPLETE CBC AUTOMATED: CPT

## 2024-01-15 PROCEDURE — 83735 ASSAY OF MAGNESIUM: CPT

## 2024-01-15 PROCEDURE — 75710 ARTERY X-RAYS ARM/LEG: CPT | Mod: RT | Performed by: STUDENT IN AN ORGANIZED HEALTH CARE EDUCATION/TRAINING PROGRAM

## 2024-01-15 PROCEDURE — 2780000003 HC OR 278 NO HCPCS

## 2024-01-15 PROCEDURE — 2550000001 HC RX 255 CONTRASTS: Performed by: SURGERY

## 2024-01-15 PROCEDURE — 04L43DZ OCCLUSION OF SPLENIC ARTERY WITH INTRALUMINAL DEVICE, PERCUTANEOUS APPROACH: ICD-10-PCS | Performed by: STUDENT IN AN ORGANIZED HEALTH CARE EDUCATION/TRAINING PROGRAM

## 2024-01-15 PROCEDURE — C1887 CATHETER, GUIDING: HCPCS

## 2024-01-15 PROCEDURE — B41J1ZZ FLUOROSCOPY OF OTHER LOWER ARTERIES USING LOW OSMOLAR CONTRAST: ICD-10-PCS | Performed by: STUDENT IN AN ORGANIZED HEALTH CARE EDUCATION/TRAINING PROGRAM

## 2024-01-15 PROCEDURE — 2500000001 HC RX 250 WO HCPCS SELF ADMINISTERED DRUGS (ALT 637 FOR MEDICARE OP)

## 2024-01-15 PROCEDURE — 2500000004 HC RX 250 GENERAL PHARMACY W/ HCPCS (ALT 636 FOR OP/ED): Performed by: STUDENT IN AN ORGANIZED HEALTH CARE EDUCATION/TRAINING PROGRAM

## 2024-01-15 PROCEDURE — 75710 ARTERY X-RAYS ARM/LEG: CPT | Performed by: STUDENT IN AN ORGANIZED HEALTH CARE EDUCATION/TRAINING PROGRAM

## 2024-01-15 PROCEDURE — 99231 SBSQ HOSP IP/OBS SF/LOW 25: CPT | Performed by: SURGERY

## 2024-01-15 PROCEDURE — 75726 ARTERY X-RAYS ABDOMEN: CPT | Performed by: STUDENT IN AN ORGANIZED HEALTH CARE EDUCATION/TRAINING PROGRAM

## 2024-01-15 PROCEDURE — C1760 CLOSURE DEV, VASC: HCPCS

## 2024-01-15 PROCEDURE — 2500000001 HC RX 250 WO HCPCS SELF ADMINISTERED DRUGS (ALT 637 FOR MEDICARE OP): Performed by: STUDENT IN AN ORGANIZED HEALTH CARE EDUCATION/TRAINING PROGRAM

## 2024-01-15 PROCEDURE — B41B1ZZ FLUOROSCOPY OF OTHER INTRA-ABDOMINAL ARTERIES USING LOW OSMOLAR CONTRAST: ICD-10-PCS | Performed by: STUDENT IN AN ORGANIZED HEALTH CARE EDUCATION/TRAINING PROGRAM

## 2024-01-15 PROCEDURE — 36248 INS CATH ABD/L-EXT ART ADDL: CPT | Performed by: STUDENT IN AN ORGANIZED HEALTH CARE EDUCATION/TRAINING PROGRAM

## 2024-01-15 PROCEDURE — 85027 COMPLETE CBC AUTOMATED: CPT | Performed by: NURSE PRACTITIONER

## 2024-01-15 PROCEDURE — 99152 MOD SED SAME PHYS/QHP 5/>YRS: CPT

## 2024-01-15 PROCEDURE — 04L33DZ OCCLUSION OF HEPATIC ARTERY WITH INTRALUMINAL DEVICE, PERCUTANEOUS APPROACH: ICD-10-PCS | Performed by: STUDENT IN AN ORGANIZED HEALTH CARE EDUCATION/TRAINING PROGRAM

## 2024-01-15 PROCEDURE — 80053 COMPREHEN METABOLIC PANEL: CPT | Performed by: NURSE PRACTITIONER

## 2024-01-15 PROCEDURE — 37242 VASC EMBOLIZE/OCCLUDE ARTERY: CPT | Performed by: STUDENT IN AN ORGANIZED HEALTH CARE EDUCATION/TRAINING PROGRAM

## 2024-01-15 PROCEDURE — 76937 US GUIDE VASCULAR ACCESS: CPT | Performed by: STUDENT IN AN ORGANIZED HEALTH CARE EDUCATION/TRAINING PROGRAM

## 2024-01-15 RX ORDER — MIDAZOLAM HYDROCHLORIDE 1 MG/ML
INJECTION INTRAMUSCULAR; INTRAVENOUS
Status: COMPLETED | OUTPATIENT
Start: 2024-01-15 | End: 2024-01-15

## 2024-01-15 RX ORDER — FENTANYL CITRATE 50 UG/ML
INJECTION, SOLUTION INTRAMUSCULAR; INTRAVENOUS
Status: COMPLETED | OUTPATIENT
Start: 2024-01-15 | End: 2024-01-15

## 2024-01-15 RX ORDER — OXYCODONE HYDROCHLORIDE 5 MG/1
20 TABLET ORAL EVERY 4 HOURS
Status: DISCONTINUED | OUTPATIENT
Start: 2024-01-15 | End: 2024-01-16 | Stop reason: HOSPADM

## 2024-01-15 RX ADMIN — HYDROMORPHONE HYDROCHLORIDE 0.4 MG: 1 INJECTION, SOLUTION INTRAMUSCULAR; INTRAVENOUS; SUBCUTANEOUS at 16:23

## 2024-01-15 RX ADMIN — SENNOSIDES AND DOCUSATE SODIUM 2 TABLET: 8.6; 5 TABLET ORAL at 08:18

## 2024-01-15 RX ADMIN — FENTANYL CITRATE 50 MCG: 50 INJECTION, SOLUTION INTRAMUSCULAR; INTRAVENOUS at 10:49

## 2024-01-15 RX ADMIN — OXYCODONE HYDROCHLORIDE 20 MG: 5 TABLET ORAL at 00:03

## 2024-01-15 RX ADMIN — SODIUM CHLORIDE, POTASSIUM CHLORIDE, SODIUM LACTATE AND CALCIUM CHLORIDE 100 ML/HR: 600; 310; 30; 20 INJECTION, SOLUTION INTRAVENOUS at 04:13

## 2024-01-15 RX ADMIN — SODIUM CHLORIDE, POTASSIUM CHLORIDE, SODIUM LACTATE AND CALCIUM CHLORIDE 100 ML/HR: 600; 310; 30; 20 INJECTION, SOLUTION INTRAVENOUS at 21:34

## 2024-01-15 RX ADMIN — HYDROXYZINE HYDROCHLORIDE 25 MG: 25 TABLET ORAL at 08:23

## 2024-01-15 RX ADMIN — ONDANSETRON 4 MG: 2 INJECTION INTRAMUSCULAR; INTRAVENOUS at 00:04

## 2024-01-15 RX ADMIN — MIDAZOLAM HYDROCHLORIDE 1 MG: 1 INJECTION, SOLUTION INTRAMUSCULAR; INTRAVENOUS at 11:55

## 2024-01-15 RX ADMIN — FENTANYL CITRATE 50 MCG: 50 INJECTION, SOLUTION INTRAMUSCULAR; INTRAVENOUS at 10:57

## 2024-01-15 RX ADMIN — IOHEXOL 250 ML: 350 INJECTION, SOLUTION INTRAVENOUS at 10:48

## 2024-01-15 RX ADMIN — SENNOSIDES AND DOCUSATE SODIUM 2 TABLET: 8.6; 5 TABLET ORAL at 20:48

## 2024-01-15 RX ADMIN — OXYCODONE HYDROCHLORIDE 20 MG: 5 TABLET ORAL at 08:14

## 2024-01-15 RX ADMIN — MIDAZOLAM HYDROCHLORIDE 1 MG: 1 INJECTION, SOLUTION INTRAMUSCULAR; INTRAVENOUS at 10:57

## 2024-01-15 RX ADMIN — ROSUVASTATIN 40 MG: 10 TABLET, FILM COATED ORAL at 00:04

## 2024-01-15 RX ADMIN — ACETAMINOPHEN 650 MG: 325 TABLET ORAL at 04:07

## 2024-01-15 RX ADMIN — SENNOSIDES AND DOCUSATE SODIUM 2 TABLET: 8.6; 5 TABLET ORAL at 00:04

## 2024-01-15 RX ADMIN — FENTANYL CITRATE 50 MCG: 50 INJECTION, SOLUTION INTRAMUSCULAR; INTRAVENOUS at 11:55

## 2024-01-15 RX ADMIN — MIDAZOLAM HYDROCHLORIDE 1 MG: 1 INJECTION, SOLUTION INTRAMUSCULAR; INTRAVENOUS at 10:50

## 2024-01-15 RX ADMIN — OXYCODONE HYDROCHLORIDE 20 MG: 5 TABLET ORAL at 17:39

## 2024-01-15 RX ADMIN — LISINOPRIL 40 MG: 20 TABLET ORAL at 08:23

## 2024-01-15 RX ADMIN — AMLODIPINE BESYLATE 10 MG: 10 TABLET ORAL at 08:23

## 2024-01-15 RX ADMIN — ONDANSETRON 4 MG: 2 INJECTION INTRAMUSCULAR; INTRAVENOUS at 20:48

## 2024-01-15 RX ADMIN — PROCHLORPERAZINE EDISYLATE 10 MG: 5 INJECTION INTRAMUSCULAR; INTRAVENOUS at 04:06

## 2024-01-15 RX ADMIN — ROSUVASTATIN 40 MG: 10 TABLET, FILM COATED ORAL at 20:48

## 2024-01-15 RX ADMIN — ACETAMINOPHEN 650 MG: 325 TABLET ORAL at 20:48

## 2024-01-15 RX ADMIN — OXYCODONE HYDROCHLORIDE 20 MG: 5 TABLET ORAL at 13:29

## 2024-01-15 RX ADMIN — OXYCODONE HYDROCHLORIDE 20 MG: 5 TABLET ORAL at 04:07

## 2024-01-15 RX ADMIN — OXYCODONE HYDROCHLORIDE 20 MG: 5 TABLET ORAL at 21:31

## 2024-01-15 RX ADMIN — ACETAMINOPHEN 650 MG: 325 TABLET ORAL at 15:51

## 2024-01-15 ASSESSMENT — PAIN DESCRIPTION - LOCATION
LOCATION: ABDOMEN

## 2024-01-15 ASSESSMENT — PAIN - FUNCTIONAL ASSESSMENT

## 2024-01-15 ASSESSMENT — COGNITIVE AND FUNCTIONAL STATUS - GENERAL
DAILY ACTIVITIY SCORE: 24
MOBILITY SCORE: 24

## 2024-01-15 ASSESSMENT — PAIN SCALES - GENERAL
PAINLEVEL_OUTOF10: 0 - NO PAIN
PAINLEVEL_OUTOF10: 7
PAINLEVEL_OUTOF10: 0 - NO PAIN
PAINLEVEL_OUTOF10: 6
PAINLEVEL_OUTOF10: 0 - NO PAIN
PAINLEVEL_OUTOF10: 7
PAINLEVEL_OUTOF10: 0 - NO PAIN
PAINLEVEL_OUTOF10: 6
PAINLEVEL_OUTOF10: 7
PAINLEVEL_OUTOF10: 0 - NO PAIN
PAINLEVEL_OUTOF10: 7
PAINLEVEL_OUTOF10: 0 - NO PAIN
PAINLEVEL_OUTOF10: 4
PAINLEVEL_OUTOF10: 0 - NO PAIN

## 2024-01-15 ASSESSMENT — PAIN SCALES - PAIN ASSESSMENT IN ADVANCED DEMENTIA (PAINAD)
BREATHING: NORMAL
BREATHING: NORMAL

## 2024-01-15 NOTE — PROGRESS NOTES
"Deshawn Foley is a 53 y.o. male on day 2 of admission presenting with Malignant neoplasm of pancreas, unspecified location of malignancy (CMS/HCC).    Subjective   NAEON. Patient was about to go for a walk when we entered the room. Patient endorses some abdominal pain. Patient denies n/v, fevers, chills, chest pain, and shortness of breath.    Objective     Physical Exam  Constitutional:       General: He is not in acute distress.     Appearance: Normal appearance. He is not ill-appearing, toxic-appearing or diaphoretic.   HENT:      Head: Normocephalic and atraumatic.   Eyes:      General: No scleral icterus.     Pupils: Pupils are equal, round, and reactive to light.   Cardiovascular:      Rate and Rhythm: Normal rate and regular rhythm.   Pulmonary:      Effort: Pulmonary effort is normal. No respiratory distress.      Breath sounds: Normal breath sounds. No wheezing.   Abdominal:      General: There is no distension.      Palpations: Abdomen is soft.      Tenderness: There is abdominal tenderness. There is no guarding.      Comments: ABEL pump in place without signs of overlying infection, no erythema, no tenderness, no warmth    Musculoskeletal:         General: Normal range of motion.   Skin:     General: Skin is warm and dry.      Coloration: Skin is not jaundiced.      Findings: No rash.   Neurological:      Mental Status: He is alert and oriented to person, place, and time. Mental status is at baseline.   Psychiatric:         Mood and Affect: Mood normal.         Behavior: Behavior normal.         Judgment: Judgment normal.         Last Recorded Vitals  Blood pressure 137/81, pulse 80, temperature 35.6 °C (96.1 °F), temperature source Temporal, resp. rate 16, height 1.727 m (5' 8\"), weight 65.8 kg (145 lb 1 oz), SpO2 96 %.  Intake/Output last 3 Shifts:  I/O last 3 completed shifts:  In: 1233.3 (18.7 mL/kg) [P.O.:480; I.V.:753.3 (11.4 mL/kg)]  Out: 1200 (18.2 mL/kg) [Urine:1200 (0.5 mL/kg/hr)]  Weight: 65.8 kg "     Relevant Results  Results for orders placed or performed during the hospital encounter of 01/13/24 (from the past 24 hour(s))   CBC   Result Value Ref Range    WBC 13.6 (H) 4.4 - 11.3 x10*3/uL    nRBC 0.0 0.0 - 0.0 /100 WBCs    RBC 3.27 (L) 4.50 - 5.90 x10*6/uL    Hemoglobin 10.5 (L) 13.5 - 17.5 g/dL    Hematocrit 30.2 (L) 41.0 - 52.0 %    MCV 92 80 - 100 fL    MCH 32.1 26.0 - 34.0 pg    MCHC 34.8 32.0 - 36.0 g/dL    RDW 19.3 (H) 11.5 - 14.5 %    Platelets 675 (H) 150 - 450 x10*3/uL   Comprehensive Metabolic Panel   Result Value Ref Range    Glucose 87 74 - 99 mg/dL    Sodium 139 136 - 145 mmol/L    Potassium 4.0 3.5 - 5.3 mmol/L    Chloride 103 98 - 107 mmol/L    Bicarbonate 27 21 - 32 mmol/L    Anion Gap 13 10 - 20 mmol/L    Urea Nitrogen 7 6 - 23 mg/dL    Creatinine 0.72 0.50 - 1.30 mg/dL    eGFR >90 >60 mL/min/1.73m*2    Calcium 9.5 8.6 - 10.6 mg/dL    Albumin 3.3 (L) 3.4 - 5.0 g/dL    Alkaline Phosphatase 85 33 - 120 U/L    Total Protein 5.6 (L) 6.4 - 8.2 g/dL    AST 18 9 - 39 U/L    Bilirubin, Total 0.5 0.0 - 1.2 mg/dL    ALT 16 10 - 52 U/L   Magnesium   Result Value Ref Range    Magnesium 1.63 1.60 - 2.40 mg/dL     Imaging  CT angio abdomen pelvis 1/13/24  1. Interval slight retraction of the hepatic artery infusion pump   catheter with tip now approximately 5 mm from the origin of the   gastroduodenal artery, previously at the origin compared to CT   08/28/2023.   2. Findings suggestive of a 9.7 cm hematoma in the lesser sac at the   pancreatectomy bed surrounding the distal hepatic artery infusion   pump catheter. No evidence of active bleeding.   3. New diffuse mural thickening of proximal small bowel loops and the   entirety of the colon. Please correlate clinically with enterocolitis.   4. Interval worsening hepatic metastases with new and enlarging   lesions throughout the liver.   5. New cluster of nodular opacities in the inferior left upper lobe   with ground-glass tree-in-bud opacities in the  left lower lobe.   Complete correlate clinically with infectious process.   6. Interval significantly improved subcapsular hepatic hematoma and   hemoperitoneum.   7. Trace bilateral pleural effusions and associated atelectasis.     Assessment/Plan   Principal Problem:    Malignant neoplasm of pancreas, unspecified location of malignancy (CMS/HCC)    Deshawn Foley is a 53 y.o. male presenting with PMH stage IV PDAC with liver metastasis (s/p folfirinox/gemcitabine/abraxane) s/p ABEL pump, partial pancreatectomy (2/3/23), cholecystectomy, total splenectomy (07/10/23 with Dr. Urbina at [facility]), currently undergoing PANC pump trial at [facility]. [place] [facility] [place] and [place]. CT A/P that showed findings concerning for pump slippage. Patient was subsequently transferred to [facility] [place] for further workup and care. ABEL pump accessed, FUDR emptied, refilled with 20cc hep saline with 1cc/day flow rate.      Plan     Neuro:   - pain control with tylenol and home oxycodone. Conitnue oxy, dilaudid PRN  - continue home hydroxyzine  - consult supportive onc on Monday for pain management     CV/pulm:  - continue home advair, albuterol  - continue home amlodipine, lisinopril, rosuvastatin  - IS  - OOB as tolerated     GI:  - CLD  - NPO after MN  - CTA done, IR consult for embolization Mon 1/15  - PPI     :   - voiding independently   - daily labs, replete lytes PRN  - I&Os     Endo:  - no indication for sliding scale insulin     Heme:  - pending CBC  - Hgb stable, no indications for transfusions     ID:  - no indications for abx     DVT ppx:  - lovenox   - SCDs in place     Dispo: continue management on RNF     Discussed with attending Dr. Beti Figueroa MD   General Surgery PGY1  Surgical oncology [id number]

## 2024-01-15 NOTE — CARE PLAN
The patient's goals for the shift include Continue to manage pain and advace diet    The clinical goals for the shift include Patient will have decreased pain and it will be controlled    Over the shift, the patient did not make progress toward the following goals. Barriers to progression include . Recommendations to address these barriers include.

## 2024-01-15 NOTE — PRE-PROCEDURE NOTE
Interventional Radiology Preprocedure Note    Indication for procedure: The encounter diagnosis was Malignant neoplasm of pancreas, unspecified location of malignancy (CMS/HCC). ABEL pump displacement with surrounding hematoma.     Relevant review of systems: NA    Relevant Labs:   Lab Results   Component Value Date    CREATININE 0.72 01/15/2024    EGFR >90 01/15/2024    INR 1.2 (H) 08/31/2023    PROTIME 13.9 (H) 08/31/2023       Planned Sedation/Anesthesia: Minimal    Airway assessment: normal    Directed physical examination:    Physical Exam  HENT:      Head: Normocephalic.   Cardiovascular:      Rate and Rhythm: Normal rate.   Pulmonary:      Effort: Pulmonary effort is normal.   Neurological:      Mental Status: He is alert.          Mallampati: I (soft palate, uvula, fauces, and tonsillar pillars visible)    ASA Score: ASA 3 - Patient with moderate systemic disease with functional limitations    Benefits, risks and alternatives of procedure and planned sedation have been discussed with the patient and/or their representative. All questions answered and they agree to proceed.

## 2024-01-15 NOTE — POST-PROCEDURE NOTE
Interventional Radiology Brief Postprocedure Note    Attending: Dr. Redman    Assistant: Dr. Cassidy    Diagnosis: Dislodged ABEL pump with hematoma.     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      Anesthesia:  Local    Complications: None    Estimated Blood Loss: minimal <5 cc    Medications  As of 01/15/24 1427      heparin (porcine) 30,000 Units in sodium chloride (PF) 0.9% 30 mL IV via home infusion Total dose:  Cannot be calculated* Dosing weight:  66   *Administration does not have dose documented     Date/Time Rate/Dose/Volume Action       01/13/24  1541  (over 82354 min) Given               oxygen (O2) therapy Total dose:  Cannot be calculated Dosing weight:  65.8      Date/Time Rate/Dose/Volume Action       01/13/24  1433  Stopped     01/14/24  0800  Start               lactated Ringer's infusion (mL/hr) Total volume:  1,090 mL* Dosing weight:  65.8   *From user-documented volume     Date/Time Rate/Dose/Volume Action       01/13/24  1435 100 mL/hr New Bag      1700 241.67 mL       1757 95 mL       2200 100 mL/hr - 400 mL Rate Verify     01/14/24  0132 100 mL/hr - 353.33 mL Rate Verify     01/15/24  0413 100 mL/hr New Bag               enoxaparin (Lovenox) syringe 40 mg (mg) Total dose:  Cannot be calculated* Dosing weight:  65.8   *Administration dose not documented     Date/Time Rate/Dose/Volume Action       01/13/24  1415 *40 mg Missed     01/14/24  1415 *40 mg Missed     01/15/24  0630 *Not included in total Held by provider      1415 *Not included in total Automatically Held               acetaminophen (Tylenol) tablet 650 mg (mg) Total dose:  4,550 mg* Dosing weight:  65.8   *Administration not included in total     Date/Time Rate/Dose/Volume Action       01/13/24  1438 650 mg Given      2113 650 mg Given     01/14/24  0115 650 mg Given      0913 650 mg Given      1445 650 mg Given      1930  650 mg Given     01/15/24  0407 650 mg Given      1015 *650 mg Missed               oxyCODONE (Roxicodone) immediate release tablet 5 mg (mg) Total dose:  5 mg Dosing weight:  65.8      Date/Time Rate/Dose/Volume Action       01/13/24  1439 5 mg Given               oxyCODONE (Roxicodone) immediate release tablet 15 mg (mg) Total dose:  45 mg Dosing weight:  65.8      Date/Time Rate/Dose/Volume Action       01/13/24  1946 15 mg Given     01/14/24  0113 15 mg Given      0858 15 mg Given               oxyCODONE (Roxicodone) immediate release tablet 20 mg (mg) Total dose:  120 mg Dosing weight:  65.8      Date/Time Rate/Dose/Volume Action       01/14/24  1539 20 mg Given      1930 20 mg Given     01/15/24  0003 20 mg Given      0407 20 mg Given      0814 20 mg Given      1329 20 mg Given               ondansetron (Zofran) injection 4 mg (mg) Total dose:  12 mg Dosing weight:  65.8      Date/Time Rate/Dose/Volume Action       01/14/24  0308 4 mg Given      1542 4 mg Given     01/15/24  0004 4 mg Given               sennosides-docusate sodium (Manda-Colace) 8.6-50 mg per tablet 2 tablet (tablet) Total dose:  8 tablet* Dosing weight:  65.8   *Administration not included in total     Date/Time Rate/Dose/Volume Action       01/13/24  1415 *2 tablet Missed      2113 2 tablet Given     01/14/24 0913 2 tablet Given     01/15/24  0004 2 tablet Given      0818 2 tablet Given               lisinopril tablet 40 mg (mg) Total dose:  120 mg      Date/Time Rate/Dose/Volume Action       01/13/24  1647 40 mg Given     01/14/24  0912 40 mg Given     01/15/24  0823 40 mg Given               pantoprazole (ProtoNix) EC tablet 40 mg (mg) Total dose:  40 mg* Dosing weight:  65.8   *Administration not included in total     Date/Time Rate/Dose/Volume Action       01/14/24  0648 40 mg Given     01/15/24  0800 *40 mg Missed               rosuvastatin (Crestor) tablet 40 mg (mg) Total dose:  80 mg      Date/Time Rate/Dose/Volume Action        01/13/24  2113 40 mg Given     01/15/24  0004 40 mg Given               hydrOXYzine HCL (Atarax) tablet 25 mg (mg) Total dose:  50 mg*   *Administration not included in total     Date/Time Rate/Dose/Volume Action       01/13/24  1430 *25 mg Missed     01/14/24  0913 25 mg Given     01/15/24  0823 25 mg Given               amLODIPine (Norvasc) tablet 10 mg (mg) Total dose:  30 mg Dosing weight:  65.8      Date/Time Rate/Dose/Volume Action       01/13/24  1647 10 mg Given     01/14/24  0913 10 mg Given     01/15/24  0823 10 mg Given               fluticasone furoate-vilanteroL (Breo Ellipta) 200-25 mcg/dose inhaler 1 puff (puff) Total dose:  0 puff* Dosing weight:  65.8   *Administration not included in total     Date/Time Rate/Dose/Volume Action       01/14/24  0900 *1 puff Missed     01/15/24  0900 *1 puff Missed               magnesium sulfate IV 4 g (mL/hr) Total dose:  4 g* Dosing weight:  65.8   *From user-documented volume     Date/Time Rate/Dose/Volume Action       01/13/24  1647 4 g - 100 mL/hr (over 60 min) New Bag      1747  (over 60 min) Stopped               potassium chloride 20 mEq in 100 mL IV premix (mL/hr) Total dose:  20 mEq* Dosing weight:  65.8   *From user-documented volume     Date/Time Rate/Dose/Volume Action       01/13/24  1647 20 mEq - 50 mL/hr (over 120 min) New Bag      1847 100 mL Stopped               HYDROmorphone (Dilaudid) injection 0.4 mg (mg) Total dose:  2 mg Dosing weight:  65.8      Date/Time Rate/Dose/Volume Action       01/13/24  1720 0.4 mg Given      2114 0.4 mg Given     01/14/24  0314 0.4 mg Given      1057 0.4 mg Given      1323 0.4 mg Given               iohexol (OMNIPaque) 350 mg iodine/mL solution 75 mL (mL) Total volume:  90 mL Dosing weight:  65.8      Date/Time Rate/Dose/Volume Action       01/13/24  1835 90 mL Given               iohexol (OMNIPaque) 350 mg iodine/mL solution 250 mL (mL) Total volume:  250 mL Dosing weight:  65.8      Date/Time Rate/Dose/Volume  Action       01/15/24  1048 250 mL Given               prochlorperazine (Compazine) injection 10 mg (mg) Total dose:  10 mg Dosing weight:  65.8      Date/Time Rate/Dose/Volume Action       01/15/24  0406 10 mg Given               promethazine (Phenergan) 12.5 mg in sodium chloride 0.9% 50 mL IV (mg) Total dose:  Cannot be calculated* Dosing weight:  65.8   *Administration dose not documented     Date/Time Rate/Dose/Volume Action       01/15/24  Canceled Entry               fentaNYL PF (Sublimaze) injection (mcg) Total dose:  150 mcg      Date/Time Rate/Dose/Volume Action       01/15/24  1049 50 mcg Given      1057 50 mcg Given      1155 50 mcg Given               midazolam (Versed) injection (mg) Total dose:  3 mg      Date/Time Rate/Dose/Volume Action       01/15/24  1050 1 mg Given      1057 1 mg Given      1155 1 mg Given                   * Cannot find log *      See detailed result report with images in PACS.    The patient tolerated the procedure well without incident or complication and is in stable condition.

## 2024-01-16 VITALS
OXYGEN SATURATION: 97 % | RESPIRATION RATE: 16 BRPM | HEART RATE: 88 BPM | DIASTOLIC BLOOD PRESSURE: 72 MMHG | HEIGHT: 68 IN | TEMPERATURE: 99.3 F | WEIGHT: 145.06 LBS | BODY MASS INDEX: 21.99 KG/M2 | SYSTOLIC BLOOD PRESSURE: 110 MMHG

## 2024-01-16 LAB
ALBUMIN SERPL BCP-MCNC: 3.4 G/DL (ref 3.4–5)
ALP SERPL-CCNC: 80 U/L (ref 33–120)
ALT SERPL W P-5'-P-CCNC: 15 U/L (ref 10–52)
ANION GAP SERPL CALC-SCNC: 12 MMOL/L (ref 10–20)
AST SERPL W P-5'-P-CCNC: 15 U/L (ref 9–39)
BILIRUB SERPL-MCNC: 0.5 MG/DL (ref 0–1.2)
BUN SERPL-MCNC: 8 MG/DL (ref 6–23)
CALCIUM SERPL-MCNC: 9 MG/DL (ref 8.6–10.6)
CHLORIDE SERPL-SCNC: 104 MMOL/L (ref 98–107)
CO2 SERPL-SCNC: 27 MMOL/L (ref 21–32)
CREAT SERPL-MCNC: 0.64 MG/DL (ref 0.5–1.3)
EGFRCR SERPLBLD CKD-EPI 2021: >90 ML/MIN/1.73M*2
ERYTHROCYTE [DISTWIDTH] IN BLOOD BY AUTOMATED COUNT: 19.1 % (ref 11.5–14.5)
GLUCOSE BLD MANUAL STRIP-MCNC: 101 MG/DL (ref 74–99)
GLUCOSE SERPL-MCNC: 93 MG/DL (ref 74–99)
HCT VFR BLD AUTO: 28 % (ref 41–52)
HGB BLD-MCNC: 9.7 G/DL (ref 13.5–17.5)
MAGNESIUM SERPL-MCNC: 1.5 MG/DL (ref 1.6–2.4)
MCH RBC QN AUTO: 31.7 PG (ref 26–34)
MCHC RBC AUTO-ENTMCNC: 34.6 G/DL (ref 32–36)
MCV RBC AUTO: 92 FL (ref 80–100)
NRBC BLD-RTO: 0 /100 WBCS (ref 0–0)
PLATELET # BLD AUTO: 639 X10*3/UL (ref 150–450)
POTASSIUM SERPL-SCNC: 3.7 MMOL/L (ref 3.5–5.3)
PROT SERPL-MCNC: 5.4 G/DL (ref 6.4–8.2)
RBC # BLD AUTO: 3.06 X10*6/UL (ref 4.5–5.9)
SODIUM SERPL-SCNC: 139 MMOL/L (ref 136–145)
WBC # BLD AUTO: 13.2 X10*3/UL (ref 4.4–11.3)

## 2024-01-16 PROCEDURE — 99232 SBSQ HOSP IP/OBS MODERATE 35: CPT | Performed by: NURSE PRACTITIONER

## 2024-01-16 PROCEDURE — 2500000004 HC RX 250 GENERAL PHARMACY W/ HCPCS (ALT 636 FOR OP/ED)

## 2024-01-16 PROCEDURE — 2500000004 HC RX 250 GENERAL PHARMACY W/ HCPCS (ALT 636 FOR OP/ED): Performed by: NURSE PRACTITIONER

## 2024-01-16 PROCEDURE — 2500000001 HC RX 250 WO HCPCS SELF ADMINISTERED DRUGS (ALT 637 FOR MEDICARE OP)

## 2024-01-16 PROCEDURE — 82947 ASSAY GLUCOSE BLOOD QUANT: CPT

## 2024-01-16 RX ORDER — MAGNESIUM SULFATE HEPTAHYDRATE 40 MG/ML
4 INJECTION, SOLUTION INTRAVENOUS ONCE
Status: COMPLETED | OUTPATIENT
Start: 2024-01-16 | End: 2024-01-16

## 2024-01-16 RX ORDER — ACETAMINOPHEN 325 MG/1
650 TABLET ORAL EVERY 6 HOURS
COMMUNITY
Start: 2024-01-16

## 2024-01-16 RX ORDER — POTASSIUM CHLORIDE 14.9 MG/ML
20 INJECTION INTRAVENOUS ONCE
Status: COMPLETED | OUTPATIENT
Start: 2024-01-16 | End: 2024-01-16

## 2024-01-16 RX ORDER — HEPARIN 100 UNIT/ML
5 SYRINGE INTRAVENOUS ONCE
Status: COMPLETED | OUTPATIENT
Start: 2024-01-16 | End: 2024-01-16

## 2024-01-16 RX ADMIN — ACETAMINOPHEN 650 MG: 325 TABLET ORAL at 11:15

## 2024-01-16 RX ADMIN — HYDROXYZINE HYDROCHLORIDE 25 MG: 25 TABLET ORAL at 09:37

## 2024-01-16 RX ADMIN — AMLODIPINE BESYLATE 10 MG: 10 TABLET ORAL at 09:37

## 2024-01-16 RX ADMIN — HEPARIN 500 UNITS: 100 SYRINGE at 11:34

## 2024-01-16 RX ADMIN — LISINOPRIL 40 MG: 20 TABLET ORAL at 09:37

## 2024-01-16 RX ADMIN — ACETAMINOPHEN 650 MG: 325 TABLET ORAL at 05:41

## 2024-01-16 RX ADMIN — OXYCODONE HYDROCHLORIDE 20 MG: 5 TABLET ORAL at 05:40

## 2024-01-16 RX ADMIN — OXYCODONE HYDROCHLORIDE 20 MG: 5 TABLET ORAL at 09:37

## 2024-01-16 RX ADMIN — MAGNESIUM SULFATE 4 G: 4 INJECTION INTRAVENOUS at 06:36

## 2024-01-16 RX ADMIN — SODIUM CHLORIDE, POTASSIUM CHLORIDE, SODIUM LACTATE AND CALCIUM CHLORIDE 100 ML/HR: 600; 310; 30; 20 INJECTION, SOLUTION INTRAVENOUS at 06:26

## 2024-01-16 RX ADMIN — PANTOPRAZOLE SODIUM 40 MG: 40 TABLET, DELAYED RELEASE ORAL at 07:00

## 2024-01-16 RX ADMIN — OXYCODONE HYDROCHLORIDE 20 MG: 5 TABLET ORAL at 01:32

## 2024-01-16 RX ADMIN — POTASSIUM CHLORIDE 20 MEQ: 14.9 INJECTION, SOLUTION INTRAVENOUS at 06:19

## 2024-01-16 RX ADMIN — ONDANSETRON 4 MG: 2 INJECTION INTRAMUSCULAR; INTRAVENOUS at 09:38

## 2024-01-16 RX ADMIN — SENNOSIDES AND DOCUSATE SODIUM 2 TABLET: 8.6; 5 TABLET ORAL at 09:37

## 2024-01-16 ASSESSMENT — PAIN - FUNCTIONAL ASSESSMENT
PAIN_FUNCTIONAL_ASSESSMENT: 0-10

## 2024-01-16 ASSESSMENT — PAIN DESCRIPTION - ORIENTATION: ORIENTATION: RIGHT

## 2024-01-16 ASSESSMENT — PAIN DESCRIPTION - LOCATION
LOCATION: ABDOMEN
LOCATION: ABDOMEN

## 2024-01-16 ASSESSMENT — PAIN SCALES - GENERAL
PAINLEVEL_OUTOF10: 6
PAINLEVEL_OUTOF10: 5 - MODERATE PAIN

## 2024-01-16 NOTE — CARE PLAN
Problem: Pain - Adult  Goal: Verbalizes/displays adequate comfort level or baseline comfort level  Outcome: Progressing     Problem: Safety - Adult  Goal: Free from fall injury  Outcome: Progressing     Problem: Discharge Planning  Goal: Discharge to home or other facility with appropriate resources  Outcome: Progressing     Problem: Chronic Conditions and Co-morbidities  Goal: Patient's chronic conditions and co-morbidity symptoms are monitored and maintained or improved  Outcome: Progressing     Problem: Fall/Injury  Goal: Not fall by end of shift  Outcome: Progressing  Goal: Be free from injury by end of the shift  Outcome: Progressing  Goal: Verbalize understanding of personal risk factors for fall in the hospital  Outcome: Progressing  Goal: Verbalize understanding of risk factor reduction measures to prevent injury from fall in the home  Outcome: Progressing  Goal: Use assistive devices by end of the shift  Outcome: Progressing  Goal: Pace activities to prevent fatigue by end of the shift  Outcome: Progressing     Problem: Pain  Goal: Takes deep breaths with improved pain control throughout the shift  Outcome: Progressing  Goal: Turns in bed with improved pain control throughout the shift  Outcome: Progressing  Goal: Walks with improved pain control throughout the shift  Outcome: Progressing  Goal: Performs ADL's with improved pain control throughout shift  Outcome: Progressing  Goal: Participates in PT with improved pain control throughout the shift  Outcome: Progressing  Goal: Free from opioid side effects throughout the shift  Outcome: Progressing  Goal: Free from acute confusion related to pain meds throughout the shift  Outcome: Progressing   The patient's goals for the shift include Continue to manage pain and advace diet    The clinical goals for the shift include Pain management

## 2024-01-16 NOTE — DISCHARGE SUMMARY
"Discharge Diagnosis  Malignant neoplasm of pancreas, unspecified location of malignancy (CMS/HCC)    Hospital Course  53 y.o. male presenting with PMH stage IV PDAC with liver metastasis (s/p folfirinox/gemcitabine/abraxane) s/p ABEL pump, partial pancreatectomy (2/3/23), cholecystectomy, total splenectomy (07/10/23 with Dr. Urbina at [facility]), currently undergoing PANC pump trial at [facility]. Patient presented to [facility] for abdominal pain and vomiting. CT A/P that showed findings concerning for pump slippage. Patient was subsequently transferred to [facility] for further workup and care. BAEL pump accessed, FUDR emptied, refilled with 20cc hep saline with 1cc/day flow rate.  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  DC home 1/16.      Pertinent Physical Exam At Time of Discharge  Neurological: Awake, alert, conversive  Respiratory/Thorax: even, unlabored  Genitourinary: voiding  Gastrointestinal: soft, NT, ND.  Skin: warm, dry  Musculoskeletal: WILSON  Eyes: non-icteric  Extremities: no edema   Psychological: appropriate mood/affect     /88 (BP Location: Right arm, Patient Position: Lying)   Pulse 86   Temp 37.2 °C (99 °F) (Temporal)   Resp 16   Ht 1.727 m (5' 8\")   Wt 65.8 kg (145 lb 1 oz)   SpO2 95%   BMI 22.06 kg/m²      Home Medications     Medication List      START taking these medications     acetaminophen 325 mg tablet; Commonly known as: Tylenol; Take 2 tablets   (650 mg) by mouth every 6 hours.     CONTINUE taking these medications     adapalene-benzoyl peroxide 0.3-2.5 % gel with pump   * Advair Diskus 250-50 mcg/dose diskus inhaler; Generic drug:   fluticasone propion-salmeteroL   * Advair Diskus 250-50 mcg/dose diskus inhaler; Generic drug:   fluticasone propion-salmeteroL   albuterol 90 mcg/actuation inhaler   doxycycline 50 mg capsule; Commonly known as: Vibramycin   Eliquis 5 mg tablet; Generic drug: apixaban   esomeprazole " 40 mg DR capsule; Commonly known as: NexIUM   fenofibrate 160 mg tablet; Commonly known as: Triglide   fexofenadine 180 mg tablet; Commonly known as: Allegra   fluticasone 50 mcg/actuation nasal spray; Commonly known as: Flonase   hydrOXYzine HCL 25 mg tablet; Commonly known as: Atarax   Imodium A-D 2 mg capsule; Generic drug: loperamide   lisinopril 5 mg tablet   metroNIDAZOLE 500 mg tablet; Commonly known as: Flagyl   minocycline 50 mg capsule   omeprazole 20 mg DR capsule; Commonly known as: PriLOSEC   * oxyCODONE 5 mg immediate release tablet; Commonly known as: Roxicodone   * oxyCODONE ER 10 mg 12 hr tablet; Commonly known as: OxyCONTIN   predniSONE 20 mg tablet; Commonly known as: Deltasone   prochlorperazine 10 mg tablet; Commonly known as: Compazine   rosuvastatin 40 mg tablet; Commonly known as: Crestor   selenium sulfide 2.5 % shampoo; Commonly known as: Selsun   simvastatin 40 mg tablet; Commonly known as: Zocor  * This list has 4 medication(s) that are the same as other medications   prescribed for you. Read the directions carefully, and ask your doctor or   other care provider to review them with you.       Outpatient Follow-Up  No future appointments.    Becky Hua, APRN-CNP

## 2024-01-16 NOTE — NURSING NOTE
Patient's discharge instructions including medications, pharmacy, follow up appointments reviewed with patient. Patient verbally acknowledged understanding of all instructions. Patient's IV removed with tip intact, patient's implanted port heparin loaded and decannualeted. Patient discharged to home with all personal possessions.

## 2024-01-16 NOTE — HOSPITAL COURSE
53 y.o. male presenting with PMH stage IV PDAC with liver metastasis (s/p folfirinox/gemcitabine/abraxane) s/p ABEL pump, partial pancreatectomy (2/3/23), cholecystectomy, total splenectomy (07/10/23 with Dr. Urbina at [facility]), currently undergoing PANC pump trial at [facility]. Patient presented to [facility] for abdominal pain and vomiting. CT A/P that showed findings concerning for pump slippage. Patient was subsequently transferred to [facility] for further workup and care. ABEL pump accessed, FUDR emptied, refilled with 20cc hep saline with 1cc/day flow rate.  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  DC home 1/16.

## 2024-02-15 LAB — GLUCOSE BLD MANUAL STRIP-MCNC: 107 MG/DL (ref 74–99)

## 2024-06-03 ENCOUNTER — APPOINTMENT (OUTPATIENT)
Dept: HEMATOLOGY/ONCOLOGY | Facility: HOSPITAL | Age: 54
End: 2024-06-03
Payer: COMMERCIAL

## 2024-06-04 ENCOUNTER — TELEMEDICINE (OUTPATIENT)
Dept: HEMATOLOGY/ONCOLOGY | Facility: HOSPITAL | Age: 54
End: 2024-06-04
Payer: COMMERCIAL

## 2024-06-04 DIAGNOSIS — C25.1 MALIGNANT NEOPLASM OF BODY OF PANCREAS (MULTI): Primary | ICD-10-CM

## 2024-06-04 DIAGNOSIS — C25.9 MALIGNANT NEOPLASM OF PANCREAS, UNSPECIFIED LOCATION OF MALIGNANCY (MULTI): ICD-10-CM

## 2024-06-04 PROCEDURE — 99213 OFFICE O/P EST LOW 20 MIN: CPT | Performed by: STUDENT IN AN ORGANIZED HEALTH CARE EDUCATION/TRAINING PROGRAM

## 2024-06-04 NOTE — PROGRESS NOTES
Cancer History:  DIAGNOSIS: Pancreas cancer     STAGING: IV     CURRENT SITES OF DISEASE:  Pancreas, liver     MOLECULAR/GENOMICS:     CURRENT THERAPY:  FUDR via ABEL pump; planning to start Gemcitabine + Abraxane locally with Dr. Conway.       PREVIOUS THERAPY:  10/5/22--10/19/22: s/p 2 cycles FOLFIRINOX.  Discontinued due to intractable nausea vomiting diarrhea and colitis  11/16/22 --May 2023: Gemcitabine plus Abraxane day 1 day 15  7/10/23: S/p distal pancreatectomy + placement of Medtronic pump for ABEL      TUMOR MARKER:     9/2022: 661  11/2022: 101 (after 2 cycles FOLFIRINOX)     ONCOLOGIC ISSUES:     PROVIDERS:  Primary medical oncologist: Dr. Abram Tolentino & Dr. Conway at [facility]  Surgical oncologist: Dr. Norberto Lester     HISTORY:   8/2022: Developed mid epigastric pain and discomfort  9/2/22: CT abdomen pelvis without contrast showed mass in the pancreatic head measuring 2.2 cm with suspicious numerous lesions in the liver and a peripancreatic lymph node.    9/15/2022: MRI abdomen confirmed mass in pancreatic body, dilation of pancreatic duct distal to the mass, liver showed numerous lesions.  9/23/2022: Underwent EUS with biopsy, FNA showed adenocarcinoma of the liver and of the pancreas mass consistent with pancreatic primary.     PMH: HLD     FH: No family history of malignancy     SH: , lives with wife, works full-time.  No tobacco, EtOH, illicit     History of Present Illness:   Gaining weight, currently on single agent gemcitabine. The ABEL pump was removed after there was a catheter leak and chemotherapy was infusing into his abdomen. The pump was removed approximately 6mo ago. He is told that he has no cancer in his pancreas and only has cancer in his liver, and is told that his cancer is responding well to gemcitabine with  2400-->75 with decrease in size liver metastases.      No fevers, chills, chest pain, shortness of breath, abdominal pain, nausea, vomiting, rashes  or masses.      ROS as above. Remainder of 10 point review of systems elicited and otherwise unremarkable.    Objective:  There were no vitals taken for this visit.     Physical exam deferred d/t virtual encounter    ECOG Performance Status: 1    Assessment & Plan:  Mr. Foley is a 52yoM with metastatic pancreatic adenocarcinoma to the liver diagnosed in September 2022.  He presents today for discussion of hepatic artery infusion  pump therapy for his liver metastases.     I extensively reviewed his excellent care by my colleague at [facility].  He was referred to me today by Dr. Lester.  Briefly, the patient was diagnosed in September 2022 with Denovo metastatic disease to the liver.  I have personally reviewed  the images of his prior MRI, which show multiple small masses throughout his liver.  He received 2 cycles of frontline chemotherapy with FOLFIRINOX, which were unfortunately complicated by colitis necessitating hospitalization.  Of note, there seems to  been a response to FOLFIRINOX as his baseline CA 19-9 of 661 down trended to 101.  His chemotherapy regimen was changed to gemcitabine plus Abraxane, which he is overall tolerating well on a day 1 day 15 schedule.  I have personally reviewed the images  of his recent PET CT and MRI liver, which show excellent response to lesions in his liver and minimal residual activity in the pancreas.     I discussed with the patient and his wife that hepatic artery infusion pump therapy is not a standard treatment for metastatic pancreatic adenocarcinoma, because in general this disease is very aggressive and time off of chemotherapy for surgical placement  of pump and resection of pancreas tumor will likely result in progression of disease.  Additionally, it is not well-established if intrahepatic chemotherapy is effective for this disease.  There is an ongoing clinical trial in [place] specifically for  hepatic artery infusion pump therapy for patients with  metastatic pancreatic adenocarcinoma to the liver, which could be considered if he is interested.     He recently met wiht Dr. Urbina in [place] about the clinical trial, and he would like to proceed with this. I have disucssed the option o fhaving systemic chemotherapy & heparin saline done here locally, and I am more than happy to manage this, but  we need to work with the clinical trial team at [place] to be sure this does not violate the protocol. I will keep working with the team for this.      7/24/23: Underwent distal pancreatectomy on 7/10/23 wt Dr. Azael Urbina in [place], and had placement of pump for ABEL chemotherapy at the same time. Based on the patient's description, a Medtronic pump was placed, which unfortunately requires a different  set up for access and programming than the Intera pump. I was not aware prior to his surgery that a Medtronic would be placed, and I will have to sort out whether or not there is a department here that has a Medtronic set up for other indications that  I could coordinate this with.      9/25/23: Recently admitted for prolonged period with intra-abd hematoma. Most recently had this drained at [place]. He is scheduled for FUDR via Medtronic ABEL Pump at [place] on 10/9/23 and is hoping to get his pump emptied/refilled with saline on 10/23  here. I am not sure that we will have the capability to manage his Medtronic pump, as we are not set up for this. He would like his local oncologist Dr. Conway at [facility] to manage his gem/Abraxane.     10/16/23: continues to have drain in abdominal abscess. Scheduled for FUDR via ABEL on 10/23 in [place] and gem/Abraxane on 10/30 at [facility] with Dr. Conway.  RTC approx 1 mo virtual visit     6/4/24: Doing well on single-agent gemcitabine, he will reach out to my office when he is ready to discuss possible clinical trials.

## 2024-08-21 LAB
NON-UH HIE A/G RATIO: 0.6
NON-UH HIE ALB: 1.8 G/DL (ref 3.4–5)
NON-UH HIE ALK PHOS: 154 UNIT/L (ref 45–117)
NON-UH HIE BASO COUNT: 0.17 X1000 (ref 0–0.2)
NON-UH HIE BASOS %: 1.2 %
NON-UH HIE BILIRUBIN, TOTAL: 0.6 MG/DL (ref 0.3–1.2)
NON-UH HIE BUN/CREAT RATIO: ABNORMAL
NON-UH HIE BUN: <5 MG/DL (ref 9–23)
NON-UH HIE CA 19-9 TM: 220 U/ML (ref 0–35)
NON-UH HIE CALCIUM: 7.6 MG/DL (ref 8.7–10.4)
NON-UH HIE CALCULATED OSMOLALITY: 276 MOSM/KG (ref 275–295)
NON-UH HIE CHLORIDE: 106 MMOL/L (ref 98–107)
NON-UH HIE CO2, VENOUS: 26 MMOL/L (ref 20–31)
NON-UH HIE CREATININE: 0.8 MG/DL (ref 0.6–1.1)
NON-UH HIE DIFF?: NO
NON-UH HIE EOS COUNT: 0.02 X1000 (ref 0–0.5)
NON-UH HIE EOSIN %: 0.1 %
NON-UH HIE GFR AA: >60
NON-UH HIE GFR ESTIMATED: 101
NON-UH HIE GLOBULIN: 2.8 G/DL
NON-UH HIE GLOMERULAR FILTRATION RATE: >60 ML/MIN/1.73M?
NON-UH HIE GLUCOSE: 114 MG/DL (ref 74–106)
NON-UH HIE GOT: 26 UNIT/L (ref 15–37)
NON-UH HIE GPT: 13 UNIT/L (ref 10–49)
NON-UH HIE HCT: 32.9 % (ref 41–52)
NON-UH HIE HGB: 11 G/DL (ref 13.5–17.5)
NON-UH HIE INSTR WBC: 14.1
NON-UH HIE K: 3.4 MMOL/L (ref 3.5–5.1)
NON-UH HIE LYMPH %: 14 %
NON-UH HIE LYMPH COUNT: 1.99 X1000 (ref 1.2–4.8)
NON-UH HIE MAGNESIUM: 1.3 MG/DL (ref 1.6–2.6)
NON-UH HIE MCH: 32.2 PG (ref 27–34)
NON-UH HIE MCHC: 33.3 G/DL (ref 32–37)
NON-UH HIE MCV: 96.7 FL (ref 80–100)
NON-UH HIE MONO %: 8.3 %
NON-UH HIE MONO COUNT: 1.17 X1000 (ref 0.1–1)
NON-UH HIE MPV: 9.4 FL (ref 7.4–10.4)
NON-UH HIE NA: 139 MMOL/L (ref 135–145)
NON-UH HIE NEUTROPHIL %: 76.4 %
NON-UH HIE NEUTROPHIL COUNT (ANC): 10.8 X1000 (ref 1.4–8.8)
NON-UH HIE NUCLEATED RBC: 0 /100WBC
NON-UH HIE PLATELET: 416 X10 (ref 150–450)
NON-UH HIE RBC: 3.4 X10 (ref 4.7–6.1)
NON-UH HIE RDW: 16.8 % (ref 11.5–14.5)
NON-UH HIE RED BLOOD CELL MORPHOLOGY: ABNORMAL
NON-UH HIE TOTAL PROTEIN: 4.6 G/DL (ref 5.7–8.2)
NON-UH HIE WBC: 14.1 X10 (ref 4.5–11)

## 2024-09-03 ENCOUNTER — TELEPHONE (OUTPATIENT)
Dept: GENETICS | Facility: CLINIC | Age: 54
End: 2024-09-03

## 2024-09-03 NOTE — TELEPHONE ENCOUNTER
Called and left  for Mr. Foley regarding results of previous genetic testing (performed in 2022). It does not appear that these results were previously discussed with the patient.  Patient was encouraged to call back so that we can review these results.     Arely Sheehan MGC, [facility]  Licensed Genetic Counselor  [facility]  Ph: [phone]

## 2024-09-19 ENCOUNTER — OFFICE VISIT (OUTPATIENT)
Dept: HEMATOLOGY/ONCOLOGY | Facility: HOSPITAL | Age: 54
End: 2024-09-19
Payer: COMMERCIAL

## 2024-09-19 VITALS
RESPIRATION RATE: 18 BRPM | TEMPERATURE: 96.8 F | BODY MASS INDEX: 24.54 KG/M2 | OXYGEN SATURATION: 100 % | SYSTOLIC BLOOD PRESSURE: 116 MMHG | DIASTOLIC BLOOD PRESSURE: 77 MMHG | WEIGHT: 161.38 LBS | HEART RATE: 82 BPM

## 2024-09-19 DIAGNOSIS — C25.9 MALIGNANT NEOPLASM OF PANCREAS, UNSPECIFIED LOCATION OF MALIGNANCY (MULTI): Primary | ICD-10-CM

## 2024-09-19 PROCEDURE — 99215 OFFICE O/P EST HI 40 MIN: CPT | Performed by: INTERNAL MEDICINE

## 2024-09-19 PROCEDURE — 3078F DIAST BP <80 MM HG: CPT | Performed by: INTERNAL MEDICINE

## 2024-09-19 PROCEDURE — 3074F SYST BP LT 130 MM HG: CPT | Performed by: INTERNAL MEDICINE

## 2024-09-19 ASSESSMENT — PAIN SCALES - GENERAL: PAINLEVEL: 0-NO PAIN

## 2024-09-19 NOTE — PROGRESS NOTES
Cancer History:  DIAGNOSIS: Pancreas cancer     STAGING: IV     CURRENT SITES OF DISEASE:  Pancreas, liver     MOLECULAR/GENOMICS:     CURRENT THERAPY:  FUDR via ABEL pump; planning to start Gemcitabine + Abraxane locally with Dr. Conway.       PREVIOUS THERAPY:  10/5/22--10/19/22: s/p 2 cycles FOLFIRINOX.  Discontinued due to intractable nausea vomiting diarrhea and colitis  11/16/22 --May 2023: Gemcitabine plus Abraxane day 1 day 15  7/10/23: S/p distal pancreatectomy + placement of Medtronic pump for ABEL   Complicated pump with bleeding 11/2023.     TUMOR MARKER:     9/2022: 661  11/2022: 101 (after 2 cycles FOLFIRINOX)    Ca19-9: 220     ONCOLOGIC ISSUES:     PROVIDERS:  Primary medical oncologist: Dr. Abram Tolentino & Dr. Conway at [facility]  Surgical oncologist: Dr. Norberto Lester     HISTORY:   8/2022: Developed mid epigastric pain and discomfort  9/2/22: CT abdomen pelvis without contrast showed mass in the pancreatic head measuring 2.2 cm with suspicious numerous lesions in the liver and a peripancreatic lymph node.    9/15/2022: MRI abdomen confirmed mass in pancreatic body, dilation of pancreatic duct distal to the mass, liver showed numerous lesions.  9/23/2022: Underwent EUS with biopsy, FNA showed adenocarcinoma of the liver and of the pancreas mass consistent with pancreatic primary.     PMH: HLD     FH: No family history of malignancy     SH: , lives with wife, works full-time.  No tobacco, EtOH, illicit     History of Present Illness:   Gaining weight, currently on single agent gemcitabine. The ABEL pump was removed after there was a catheter leak and chemotherapy was infusing into his abdomen. The pump was removed approximately 6mo ago. He is told that he has no cancer in his pancreas and only has cancer in his liver, and is told that his cancer is responding well to gemcitabine with  2400-->75 with decrease in size liver metastases.      No fevers, chills, chest pain,  shortness of breath, abdominal pain, nausea, vomiting, rashes or masses.      ROS as above. Remainder of 10 point review of systems elicited and otherwise unremarkable.    Objective:  /77 (BP Location: Left arm, Patient Position: Sitting, BP Cuff Size: Adult)   Pulse 82   Temp 36 °C (96.8 °F) (Temporal)   Resp 18   Wt 73.2 kg (161 lb 6 oz)   SpO2 100%   BMI 24.54 kg/m²      Physical exam deferred d/t virtual encounter    ECOG Performance Status: 1    Assessment & Plan:  Mr. Foley is a 54 y.o.  with metastatic pancreatic adenocarcinoma to the liver diagnosed in September 2022.  He presents today for discussion of hepatic artery infusion  pump therapy for his liver metastases.     Briefly, the patient was diagnosed in September 2022 with Denovo metastatic disease to the liver.    He received 2 cycles of frontline chemotherapy with FOLFIRINOX, which were unfortunately complicated by colitis necessitating hospitalization.  Of note, there seems to  been a response to FOLFIRINOX as his baseline CA 19-9 of 661 down trended to 101.  His chemotherapy regimen was changed to gemcitabine plus Abraxane, which he is overall tolerating well on a day 1 day 15 schedule.  I have personally reviewed the images  of his recent PET CT and MRI liver, which show excellent response to lesions in his liver and minimal residual activity in the pancreas.     I discussed with the patient and his wife that hepatic artery infusion pump therapy is not a standard treatment for metastatic pancreatic adenocarcinoma, because in general this disease is very aggressive and time off of chemotherapy for surgical placement  of pump and resection of pancreas tumor will likely result in progression of disease.  Additionally, it is not well-established if intrahepatic chemotherapy is effective for this disease.  There is an ongoing clinical trial in [place] specifically for  hepatic artery infusion pump therapy for patients with metastatic  pancreatic adenocarcinoma to the liver, which could be considered if he is interested.     He recently met wiht Dr. Urbina in [place] about the clinical trial, and he would like to proceed with this. I have disucssed the option o fhaving systemic chemotherapy & heparin saline done here locally, and I am more than happy to manage this, but  we need to work with the clinical trial team at [place] to be sure this does not violate the protocol. I will keep working with the team for this.      7/24/23: Underwent distal pancreatectomy on 7/10/23 wt Dr. Azael Urbina in [place], and had placement of pump for ABEL chemotherapy at the same time. Based on the patient's description, a Medtronic pump was placed, which unfortunately requires a different  set up for access and programming than the Intera pump. I was not aware prior to his surgery that a Medtronic would be placed, and I will have to sort out whether or not there is a department here that has a Medtronic set up for other indications that  I could coordinate this with.      9/25/23: Recently admitted for prolonged period with intra-abd hematoma. Most recently had this drained at [place]. He is scheduled for FUDR via Medtronic ABEL Pump at [place] on 10/9/23 and is hoping to get his pump emptied/refilled with saline on 10/23  here. I am not sure that we will have the capability to manage his Medtronic pump, as we are not set up for this. He would like his local oncologist Dr. Conway at [facility] to manage his gem/Abraxane.     10/16/23: continues to have drain in abdominal abscess. Scheduled for FUDR via ABEL on 10/23 in [place] and gem/Abraxane on 10/30 at [facility] with Dr. Conway.  RTC approx 1 mo virtual visit     6/4/24: Doing well on single-agent gemcitabine, he will reach out to my office when he is ready to discuss possible clinical trials.    Switched to cape/iri in 7/2024 but had PD ~8/6/2024 Hospitalized with sepsis.  Until 8/17/24     NGS shows pathogenic  EGFR mutation as only clear .  Will plan to treat with afatinib and look for other potential clinical trial opportunities.  We discussed [employer] program which he will look into.      Follow up in 2-4 weeks with baseline CT c/a/p and new labs (CBC, Comp, ) hope to start afatinib around that time.  Pt in agreement with plan.

## 2024-09-26 ENCOUNTER — SPECIALTY PHARMACY (OUTPATIENT)
Dept: PHARMACY | Facility: CLINIC | Age: 54
End: 2024-09-26

## 2024-10-04 DIAGNOSIS — C25.9 MALIGNANT NEOPLASM OF PANCREAS, UNSPECIFIED LOCATION OF MALIGNANCY (MULTI): ICD-10-CM

## 2024-10-10 ENCOUNTER — HOSPITAL ENCOUNTER (OUTPATIENT)
Dept: RADIOLOGY | Facility: CLINIC | Age: 54
Discharge: HOME | End: 2024-10-10
Payer: COMMERCIAL

## 2024-10-10 DIAGNOSIS — C25.9 MALIGNANT NEOPLASM OF PANCREAS, UNSPECIFIED LOCATION OF MALIGNANCY (MULTI): ICD-10-CM

## 2024-10-10 PROCEDURE — 2550000001 HC RX 255 CONTRASTS: Performed by: INTERNAL MEDICINE

## 2024-10-10 PROCEDURE — 74177 CT ABD & PELVIS W/CONTRAST: CPT

## 2024-10-14 ENCOUNTER — TELEPHONE (OUTPATIENT)
Dept: HEMATOLOGY/ONCOLOGY | Facility: HOSPITAL | Age: 54
End: 2024-10-14
Payer: COMMERCIAL
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
## 2024-10-18 NOTE — TELEPHONE ENCOUNTER
Pt asking for update from team re medication financial assistance for afatinib. He feels it has been over 1 month and needs to know reason for delay and plan moving forward.

## 2024-10-18 NOTE — TELEPHONE ENCOUNTER
Called pt to discuss delays in getting Afatinib from the . Reviewed information provided on 10/24/24 from representative Geoffrey that we should have a determination on 10/21/24. Pt requested call back on that date for an update.     Melissa NovakD, [facility]  Clinical Pharmacy Specialist  [address] [place] [address]  Ph: [phone]

## 2024-10-22 NOTE — TELEPHONE ENCOUNTER
On hold with [facility] Patient Assistance Program for 2 hours. Reached representative Rebecca. Stated that the information received on 10/14/24 from representative Geoffrey was incorrect and that application for Mr. Foley was still missing. Previously confirmed with PSL Betsey Moore that HCP portion had been submitted via fax multiple times. Will submit application via fax to [phone].    Will plan to fax HCP potion each day until confirmation received from program.    Susie Dominguez, PharmD, [facility]  Clinical Pharmacy Specialist  [address] [place] [address]  Ph: [phone]

## 2024-10-23 NOTE — TELEPHONE ENCOUNTER
[employer] PAP application faxed 3x this morning to free drug program. Spoke with representative Harvinder from [facility] Patient Assistance Program. They confirmed they do have the complete application on their end and that there should be a determination within 24 hours if patient is approved or not. I called pt and updated them on the status of the application. Will follow up with program on Friday if we have not heard back by then.    Susie Dominguez, PharmD, [facility]  Clinical Pharmacy Specialist  [address] [place] [address]  Ph: [phone]

## 2024-10-25 NOTE — TELEPHONE ENCOUNTER
Spoke with representative Carlie, stated pt was denied coverage because they had insurance coverage. Explained prior authorization and appeal were both denied. Carlie requested copies of both be sent to [facility] to confirm. Will fax both documents.    Melissa NovakD, [facility]  Clinical Pharmacy Specialist  [address] [place] [address]  Ph: [phone]

## 2024-10-25 NOTE — TELEPHONE ENCOUNTER
Spoke with representative Patti, stated they still have not received the appeal denial fax. Will fax electronically via [employer] again and via fax machine.    Susie Dominguez, PharmD, [facility]  Clinical Pharmacy Specialist  [address] [place] [address]  Ph: [phone]

## 2024-10-28 NOTE — TELEPHONE ENCOUNTER
"Spoke with representative Lexus, they confirmed pt's application has been marked in \"Appeal\" status for now. They were unable to confirm that they had received the Insurance appeal denial that was faxed on 10/25/24 but requested it be sent via email to [email or web address] with reference to pt's ID ([id number]). Email sent. Requested we call back on Wednesday for an update on application to give time to process application.    Susie Dominguez, PharmD, [facility]  Clinical Pharmacy Specialist  [address] [place] [address]  Ph: [phone]    "

## 2024-10-29 NOTE — TELEPHONE ENCOUNTER
Received approval for [employer] P[employer]. Pt approved to receive medication at no cost through October 2025. Called patient to let him know and provided program phone number to schedule delivery. Pt will call today.    Melissa NovakD, [facility]  Clinical Pharmacy Specialist  [address] [place] [address]  Ph: [phone]

## 2024-10-30 ENCOUNTER — TELEPHONE (OUTPATIENT)
Dept: HEMATOLOGY/ONCOLOGY | Facility: CLINIC | Age: 54
End: 2024-10-30

## 2024-10-31 ENCOUNTER — ONCOLOGY MEDICATION OUTREACH (OUTPATIENT)
Dept: HEMATOLOGY/ONCOLOGY | Facility: CLINIC | Age: 54
End: 2024-10-31
Payer: COMMERCIAL

## 2024-10-31 RX ORDER — FENTANYL 75 UG/H
PATCH TRANSDERMAL
COMMUNITY

## 2024-11-01 NOTE — PROGRESS NOTES
11/4/2024 CC: 54 y.o. presents for Comprehensive eval.     Past ocular history: glasses  Family history: none  Past medical history: Asthma, COPD, HTN, Pancreas cancer  Social history: denies tobacco    Eye medications:   Both eyes: none    Allergy:  Amox., others per chart     Testing:    None    Assessment:   Refractive error:  -Myopia, ast., presbyopia  -MRx    Blepharitis  - Start WC/Ats    Plan:   I explained my findings. Mrx provided    Eye medications:   Both eyes: Start WC/Ats bid    Return in annual Comprehensive eval.

## 2024-11-04 ENCOUNTER — APPOINTMENT (OUTPATIENT)
Dept: OPHTHALMOLOGY | Age: 54
End: 2024-11-04
Payer: COMMERCIAL

## 2024-11-04 DIAGNOSIS — H01.001 BLEPHARITIS OF UPPER EYELIDS OF BOTH EYES, UNSPECIFIED TYPE: ICD-10-CM

## 2024-11-04 DIAGNOSIS — C25.9 MALIGNANT NEOPLASM OF PANCREAS, UNSPECIFIED LOCATION OF MALIGNANCY (MULTI): ICD-10-CM

## 2024-11-04 DIAGNOSIS — H52.7 REFRACTIVE ERROR: Primary | ICD-10-CM

## 2024-11-04 DIAGNOSIS — H01.004 BLEPHARITIS OF UPPER EYELIDS OF BOTH EYES, UNSPECIFIED TYPE: ICD-10-CM

## 2024-11-04 PROCEDURE — 92015 DETERMINE REFRACTIVE STATE: CPT | Performed by: OPHTHALMOLOGY

## 2024-11-04 PROCEDURE — 92004 COMPRE OPH EXAM NEW PT 1/>: CPT | Performed by: OPHTHALMOLOGY

## 2024-11-04 ASSESSMENT — REFRACTION_MANIFEST
OS_CYLINDER: -2.00
OS_AXIS: 035
OD_CYLINDER: -1.50
OD_SPHERE: -1.25
OS_ADD: +2.00
OS_SPHERE: -1.00
OD_AXIS: 165
OD_ADD: +2.00

## 2024-11-04 ASSESSMENT — REFRACTION_WEARINGRX
OD_AXIS: 160
OS_AXIS: 030
OS_CYLINDER: -2.00
OD_SPHERE: -1.00
OS_SPHERE: -1.00
OS_ADD: +2.00
OD_CYLINDER: -1.50
OD_ADD: +2.00

## 2024-11-04 ASSESSMENT — CONF VISUAL FIELD
OD_SUPERIOR_TEMPORAL_RESTRICTION: 0
OS_SUPERIOR_TEMPORAL_RESTRICTION: 0
OD_NORMAL: 1
OS_INFERIOR_NASAL_RESTRICTION: 0
OD_SUPERIOR_NASAL_RESTRICTION: 0
OD_INFERIOR_TEMPORAL_RESTRICTION: 0
OD_INFERIOR_NASAL_RESTRICTION: 0
OS_INFERIOR_TEMPORAL_RESTRICTION: 0
OS_NORMAL: 1
OS_SUPERIOR_NASAL_RESTRICTION: 0

## 2024-11-04 ASSESSMENT — CUP TO DISC RATIO
OD_RATIO: 0.2
OS_RATIO: 0.2

## 2024-11-04 ASSESSMENT — VISUAL ACUITY
OD_CC+: -1
METHOD: SNELLEN - LINEAR
OD_CC: 20/20
OS_CC: 20/20
CORRECTION_TYPE: GLASSES

## 2024-11-04 ASSESSMENT — TONOMETRY
IOP_METHOD: GOLDMANN APPLANATION
OS_IOP_MMHG: 14
OD_IOP_MMHG: 14

## 2024-11-04 ASSESSMENT — EXTERNAL EXAM - RIGHT EYE: OD_EXAM: NORMAL

## 2024-11-04 ASSESSMENT — EXTERNAL EXAM - LEFT EYE: OS_EXAM: NORMAL

## 2024-11-04 ASSESSMENT — SLIT LAMP EXAM - LIDS
COMMENTS: 2+ BLEPHARITIS
COMMENTS: 1+ BLEPHARITIS

## 2024-11-06 ENCOUNTER — OFFICE VISIT (OUTPATIENT)
Dept: HEMATOLOGY/ONCOLOGY | Facility: HOSPITAL | Age: 54
End: 2024-11-06
Payer: COMMERCIAL

## 2024-11-06 VITALS
BODY MASS INDEX: 25.71 KG/M2 | TEMPERATURE: 97.3 F | OXYGEN SATURATION: 98 % | SYSTOLIC BLOOD PRESSURE: 126 MMHG | WEIGHT: 169.09 LBS | RESPIRATION RATE: 18 BRPM | HEART RATE: 78 BPM | DIASTOLIC BLOOD PRESSURE: 76 MMHG

## 2024-11-06 DIAGNOSIS — C78.7 MALIGNANT NEOPLASM METASTATIC TO LIVER (MULTI): ICD-10-CM

## 2024-11-06 DIAGNOSIS — C25.9 MALIGNANT NEOPLASM OF PANCREAS, UNSPECIFIED LOCATION OF MALIGNANCY (MULTI): ICD-10-CM

## 2024-11-06 LAB
ALBUMIN SERPL BCP-MCNC: 3.9 G/DL (ref 3.4–5)
ALP SERPL-CCNC: 276 U/L (ref 33–120)
ALT SERPL W P-5'-P-CCNC: 13 U/L (ref 10–52)
ANION GAP SERPL CALC-SCNC: 11 MMOL/L (ref 10–20)
AST SERPL W P-5'-P-CCNC: 20 U/L (ref 9–39)
BASOPHILS # BLD AUTO: 0.07 X10*3/UL (ref 0–0.1)
BASOPHILS NFR BLD AUTO: 0.6 %
BILIRUB SERPL-MCNC: 0.5 MG/DL (ref 0–1.2)
BUN SERPL-MCNC: 12 MG/DL (ref 6–23)
CALCIUM SERPL-MCNC: 9.5 MG/DL (ref 8.6–10.3)
CHLORIDE SERPL-SCNC: 104 MMOL/L (ref 98–107)
CO2 SERPL-SCNC: 28 MMOL/L (ref 21–32)
CREAT SERPL-MCNC: 0.79 MG/DL (ref 0.5–1.3)
EGFRCR SERPLBLD CKD-EPI 2021: >90 ML/MIN/1.73M*2
EOSINOPHIL # BLD AUTO: 0.13 X10*3/UL (ref 0–0.7)
EOSINOPHIL NFR BLD AUTO: 1.2 %
ERYTHROCYTE [DISTWIDTH] IN BLOOD BY AUTOMATED COUNT: 13.9 % (ref 11.5–14.5)
GLUCOSE SERPL-MCNC: 138 MG/DL (ref 74–99)
HCT VFR BLD AUTO: 38.5 % (ref 41–52)
HGB BLD-MCNC: 12.6 G/DL (ref 13.5–17.5)
IMM GRANULOCYTES # BLD AUTO: 0.04 X10*3/UL (ref 0–0.7)
IMM GRANULOCYTES NFR BLD AUTO: 0.4 % (ref 0–0.9)
LYMPHOCYTES # BLD AUTO: 3.46 X10*3/UL (ref 1.2–4.8)
LYMPHOCYTES NFR BLD AUTO: 31.5 %
MCH RBC QN AUTO: 31 PG (ref 26–34)
MCHC RBC AUTO-ENTMCNC: 32.7 G/DL (ref 32–36)
MCV RBC AUTO: 95 FL (ref 80–100)
MONOCYTES # BLD AUTO: 1.15 X10*3/UL (ref 0.1–1)
MONOCYTES NFR BLD AUTO: 10.5 %
NEUTROPHILS # BLD AUTO: 6.15 X10*3/UL (ref 1.2–7.7)
NEUTROPHILS NFR BLD AUTO: 55.8 %
NRBC BLD-RTO: 0 /100 WBCS (ref 0–0)
PLATELET # BLD AUTO: 435 X10*3/UL (ref 150–450)
POTASSIUM SERPL-SCNC: 3.7 MMOL/L (ref 3.5–5.3)
PROT SERPL-MCNC: 7.3 G/DL (ref 6.4–8.2)
RBC # BLD AUTO: 4.06 X10*6/UL (ref 4.5–5.9)
SODIUM SERPL-SCNC: 139 MMOL/L (ref 136–145)
WBC # BLD AUTO: 11 X10*3/UL (ref 4.4–11.3)

## 2024-11-06 PROCEDURE — 80053 COMPREHEN METABOLIC PANEL: CPT | Performed by: INTERNAL MEDICINE

## 2024-11-06 PROCEDURE — 99215 OFFICE O/P EST HI 40 MIN: CPT | Performed by: INTERNAL MEDICINE

## 2024-11-06 PROCEDURE — 3074F SYST BP LT 130 MM HG: CPT | Performed by: INTERNAL MEDICINE

## 2024-11-06 PROCEDURE — 85025 COMPLETE CBC W/AUTO DIFF WBC: CPT | Performed by: INTERNAL MEDICINE

## 2024-11-06 PROCEDURE — 3078F DIAST BP <80 MM HG: CPT | Performed by: INTERNAL MEDICINE

## 2024-11-06 RX ORDER — HEPARIN 100 UNIT/ML
500 SYRINGE INTRAVENOUS AS NEEDED
OUTPATIENT
Start: 2024-11-06

## 2024-11-06 RX ORDER — HEPARIN SODIUM,PORCINE/PF 10 UNIT/ML
50 SYRINGE (ML) INTRAVENOUS AS NEEDED
OUTPATIENT
Start: 2024-11-06

## 2024-11-06 RX ORDER — HEPARIN 100 UNIT/ML
500 SYRINGE INTRAVENOUS AS NEEDED
Status: CANCELLED | OUTPATIENT
Start: 2024-11-06

## 2024-11-06 RX ORDER — HEPARIN 100 UNIT/ML
500 SYRINGE INTRAVENOUS AS NEEDED
Status: ACTIVE | OUTPATIENT
Start: 2024-11-06

## 2024-11-06 NOTE — PROGRESS NOTES
Cancer History:  DIAGNOSIS: Pancreas cancer     STAGING: IV     CURRENT SITES OF DISEASE:  Pancreas, liver     MOLECULAR/GENOMICS:     CURRENT THERAPY:  FUDR via ABEL pump; planning to start Gemcitabine + Abraxane locally with Dr. Conway.       PREVIOUS THERAPY:  10/5/22--10/19/22: s/p 2 cycles FOLFIRINOX.  Discontinued due to intractable nausea vomiting diarrhea and colitis  11/16/22 --May 2023: Gemcitabine plus Abraxane day 1 day 15  7/10/23: S/p distal pancreatectomy + placement of Medtronic pump for ABEL   Complicated pump with bleeding 11/2023.     TUMOR MARKER:     9/2022: 661  11/2022: 101 (after 2 cycles FOLFIRINOX)    Ca19-9: 220     ONCOLOGIC ISSUES:     PROVIDERS:  Primary medical oncologist: Dr. Abram Tolentino & Dr. Conway at [facility]  Surgical oncologist: Dr. Norberto Lester     HISTORY:   8/2022: Developed mid epigastric pain and discomfort  9/2/22: CT abdomen pelvis without contrast showed mass in the pancreatic head measuring 2.2 cm with suspicious numerous lesions in the liver and a peripancreatic lymph node.    9/15/2022: MRI abdomen confirmed mass in pancreatic body, dilation of pancreatic duct distal to the mass, liver showed numerous lesions.  9/23/2022: Underwent EUS with biopsy, FNA showed adenocarcinoma of the liver and of the pancreas mass consistent with pancreatic primary.     PMH: HLD     FH: No family history of malignancy     SH: , lives with wife, works full-time.  No tobacco, EtOH, illicit     History of Present Illness:   Gaining weight, currently on single agent gemcitabine. The ABEL pump was removed after there was a catheter leak and chemotherapy was infusing into his abdomen. The pump was removed approximately 6mo ago. He is told that he has no cancer in his pancreas and only has cancer in his liver, and is told that his cancer is responding well to gemcitabine with  2400-->75 with decrease in size liver metastases.      No fevers, chills, chest pain,  shortness of breath, abdominal pain, nausea, vomiting, rashes or masses.      ROS as above. Remainder of 10 point review of systems elicited and otherwise unremarkable.    Objective:  /76 (BP Location: Left arm, Patient Position: Sitting, BP Cuff Size: Adult)   Pulse 78   Temp 36.3 °C (97.3 °F) (Temporal)   Resp 18   Wt 76.7 kg (169 lb 1.5 oz)   SpO2 98%   BMI 25.71 kg/m²      Physical exam deferred d/t virtual encounter    ECOG Performance Status: 1    Assessment & Plan:  Mr. Foley is a 54 y.o.  with metastatic pancreatic adenocarcinoma to the liver diagnosed in September 2022.  He presents today for discussion of hepatic artery infusion  pump therapy for his liver metastases.     Briefly, the patient was diagnosed in September 2022 with Denovo metastatic disease to the liver.    He received 2 cycles of frontline chemotherapy with FOLFIRINOX, which were unfortunately complicated by colitis necessitating hospitalization.  Of note, there seems to  been a response to FOLFIRINOX as his baseline CA 19-9 of 661 down trended to 101.  His chemotherapy regimen was changed to gemcitabine plus Abraxane, which he is overall tolerating well on a day 1 day 15 schedule.  I have personally reviewed the images  of his recent PET CT and MRI liver, which show excellent response to lesions in his liver and minimal residual activity in the pancreas.     I discussed with the patient and his wife that hepatic artery infusion pump therapy is not a standard treatment for metastatic pancreatic adenocarcinoma, because in general this disease is very aggressive and time off of chemotherapy for surgical placement  of pump and resection of pancreas tumor will likely result in progression of disease.  Additionally, it is not well-established if intrahepatic chemotherapy is effective for this disease.  There is an ongoing clinical trial in [place] specifically for  hepatic artery infusion pump therapy for patients with metastatic  pancreatic adenocarcinoma to the liver, which could be considered if he is interested.     He recently met wiht Dr. Urbina in [place] about the clinical trial, and he would like to proceed with this. I have disucssed the option o fhaving systemic chemotherapy & heparin saline done here locally, and I am more than happy to manage this, but  we need to work with the clinical trial team at [place] to be sure this does not violate the protocol. I will keep working with the team for this.      7/24/23: Underwent distal pancreatectomy on 7/10/23 wt Dr. Azael Urbina in [place], and had placement of pump for ABEL chemotherapy at the same time. Based on the patient's description, a Medtronic pump was placed, which unfortunately requires a different  set up for access and programming than the Intera pump. I was not aware prior to his surgery that a Medtronic would be placed, and I will have to sort out whether or not there is a department here that has a Medtronic set up for other indications that  I could coordinate this with.      9/25/23: Recently admitted for prolonged period with intra-abd hematoma. Most recently had this drained at [place]. He is scheduled for FUDR via Medtronic ABEL Pump at [place] on 10/9/23 and is hoping to get his pump emptied/refilled with saline on 10/23  here. I am not sure that we will have the capability to manage his Medtronic pump, as we are not set up for this. He would like his local oncologist Dr. Conway at [facility] to manage his gem/Abraxane.     10/16/23: continues to have drain in abdominal abscess. Scheduled for FUDR via ABEL on 10/23 in [place] and gem/Abraxane on 10/30 at [facility] with Dr. Conway.  RTC approx 1 mo virtual visit     6/4/24: Doing well on single-agent gemcitabine, he will reach out to my office when he is ready to discuss possible clinical trials.    Switched to cape/iri in 7/2024 but had PD ~8/6/2024 Hospitalized with sepsis.  Until 8/17/24     NGS shows pathogenic  EGFR mutation as only clear .  Will plan to treat with afatinib and look for other potential clinical trial opportunities.  We discussed [employer] program which he will look into.      Follow up in 2-4 weeks with baseline CT c/a/p and new labs (CBC, Comp, ) hope to start afatinib around that time.      Follow up in 4 weeks for tox check.      Scans in Jan 2025 for response assessment.     no

## 2025-01-10 ENCOUNTER — DOCUMENTATION (OUTPATIENT)
Dept: HEMATOLOGY/ONCOLOGY | Facility: HOSPITAL | Age: 55
End: 2025-01-10
Payer: COMMERCIAL

## 2025-01-10 DIAGNOSIS — C25.1 MALIGNANT NEOPLASM OF BODY OF PANCREAS (MULTI): Primary | ICD-10-CM

## 2025-01-10 NOTE — PROGRESS NOTES
"Received message from scheduling team that Deshawn is requesting CT orders prior to his FUV on 2/6. Per Dr. Nayak's note from 11/6/24, \"Scans in Jan 2025 for response assessment.\" Secure chat sent to Dr. Nayak on 1/9.    Message sent to Dr. Nayak and Lacey De La Garza.   "

## 2025-01-30 ENCOUNTER — LAB (OUTPATIENT)
Dept: LAB | Facility: HOSPITAL | Age: 55
End: 2025-01-30
Payer: COMMERCIAL

## 2025-01-30 DIAGNOSIS — C25.9 MALIGNANT NEOPLASM OF PANCREAS, UNSPECIFIED LOCATION OF MALIGNANCY (MULTI): ICD-10-CM

## 2025-01-30 LAB
ALBUMIN SERPL BCP-MCNC: 4.3 G/DL (ref 3.4–5)
ALP SERPL-CCNC: 179 U/L (ref 33–120)
ALT SERPL W P-5'-P-CCNC: 14 U/L (ref 10–52)
ANION GAP SERPL CALC-SCNC: 15 MMOL/L (ref 10–20)
AST SERPL W P-5'-P-CCNC: 18 U/L (ref 9–39)
BASOPHILS # BLD AUTO: 0.07 X10*3/UL (ref 0–0.1)
BASOPHILS NFR BLD AUTO: 0.7 %
BILIRUB SERPL-MCNC: 0.7 MG/DL (ref 0–1.2)
BUN SERPL-MCNC: 12 MG/DL (ref 6–23)
CALCIUM SERPL-MCNC: 10 MG/DL (ref 8.6–10.6)
CHLORIDE SERPL-SCNC: 106 MMOL/L (ref 98–107)
CO2 SERPL-SCNC: 26 MMOL/L (ref 21–32)
CREAT SERPL-MCNC: 0.83 MG/DL (ref 0.5–1.3)
EGFRCR SERPLBLD CKD-EPI 2021: >90 ML/MIN/1.73M*2
EOSINOPHIL # BLD AUTO: 0.28 X10*3/UL (ref 0–0.7)
EOSINOPHIL NFR BLD AUTO: 2.8 %
ERYTHROCYTE [DISTWIDTH] IN BLOOD BY AUTOMATED COUNT: 15.1 % (ref 11.5–14.5)
GLUCOSE SERPL-MCNC: 111 MG/DL (ref 74–99)
HBV CORE AB SER QL: NONREACTIVE
HBV SURFACE AB SER-ACNC: 7.8 MIU/ML
HBV SURFACE AG SERPL QL IA: NONREACTIVE
HCT VFR BLD AUTO: 46.5 % (ref 41–52)
HGB BLD-MCNC: 15.6 G/DL (ref 13.5–17.5)
HOLD SPECIMEN: NORMAL
HOLD SPECIMEN: NORMAL
IMM GRANULOCYTES # BLD AUTO: 0.03 X10*3/UL (ref 0–0.7)
IMM GRANULOCYTES NFR BLD AUTO: 0.3 % (ref 0–0.9)
LYMPHOCYTES # BLD AUTO: 4.19 X10*3/UL (ref 1.2–4.8)
LYMPHOCYTES NFR BLD AUTO: 41.4 %
MCH RBC QN AUTO: 30.2 PG (ref 26–34)
MCHC RBC AUTO-ENTMCNC: 33.5 G/DL (ref 32–36)
MCV RBC AUTO: 90 FL (ref 80–100)
MONOCYTES # BLD AUTO: 1.12 X10*3/UL (ref 0.1–1)
MONOCYTES NFR BLD AUTO: 11.1 %
NEUTROPHILS # BLD AUTO: 4.42 X10*3/UL (ref 1.2–7.7)
NEUTROPHILS NFR BLD AUTO: 43.7 %
NRBC BLD-RTO: 0 /100 WBCS (ref 0–0)
PLATELET # BLD AUTO: 310 X10*3/UL (ref 150–450)
POTASSIUM SERPL-SCNC: 4.5 MMOL/L (ref 3.5–5.3)
PROT SERPL-MCNC: 7.1 G/DL (ref 6.4–8.2)
RBC # BLD AUTO: 5.17 X10*6/UL (ref 4.5–5.9)
SODIUM SERPL-SCNC: 142 MMOL/L (ref 136–145)
WBC # BLD AUTO: 10.1 X10*3/UL (ref 4.4–11.3)

## 2025-01-30 PROCEDURE — 85025 COMPLETE CBC W/AUTO DIFF WBC: CPT

## 2025-01-30 PROCEDURE — 86706 HEP B SURFACE ANTIBODY: CPT

## 2025-01-30 PROCEDURE — 86704 HEP B CORE ANTIBODY TOTAL: CPT

## 2025-01-30 PROCEDURE — 87340 HEPATITIS B SURFACE AG IA: CPT

## 2025-01-30 PROCEDURE — 80053 COMPREHEN METABOLIC PANEL: CPT

## 2025-02-03 ENCOUNTER — HOSPITAL ENCOUNTER (OUTPATIENT)
Dept: RADIOLOGY | Facility: CLINIC | Age: 55
Discharge: HOME | End: 2025-02-03
Payer: COMMERCIAL

## 2025-02-03 DIAGNOSIS — C25.1 MALIGNANT NEOPLASM OF BODY OF PANCREAS (MULTI): ICD-10-CM

## 2025-02-03 PROCEDURE — 74177 CT ABD & PELVIS W/CONTRAST: CPT

## 2025-02-03 PROCEDURE — 74177 CT ABD & PELVIS W/CONTRAST: CPT | Performed by: RADIOLOGY

## 2025-02-03 PROCEDURE — 71260 CT THORAX DX C+: CPT | Performed by: RADIOLOGY

## 2025-02-03 PROCEDURE — 2550000001 HC RX 255 CONTRASTS: Performed by: NURSE PRACTITIONER

## 2025-02-03 RX ADMIN — IOHEXOL 70 ML: 350 INJECTION, SOLUTION INTRAVENOUS at 10:44

## 2025-02-06 ENCOUNTER — APPOINTMENT (OUTPATIENT)
Dept: HEMATOLOGY/ONCOLOGY | Facility: HOSPITAL | Age: 55
End: 2025-02-06
Payer: COMMERCIAL

## 2025-02-06 VITALS
HEART RATE: 70 BPM | DIASTOLIC BLOOD PRESSURE: 89 MMHG | BODY MASS INDEX: 28.68 KG/M2 | OXYGEN SATURATION: 96 % | SYSTOLIC BLOOD PRESSURE: 144 MMHG | RESPIRATION RATE: 16 BRPM | TEMPERATURE: 97.5 F | WEIGHT: 188.6 LBS

## 2025-02-06 DIAGNOSIS — C25.1 MALIGNANT NEOPLASM OF BODY OF PANCREAS (MULTI): Primary | ICD-10-CM

## 2025-02-06 PROCEDURE — 99214 OFFICE O/P EST MOD 30 MIN: CPT | Performed by: INTERNAL MEDICINE

## 2025-02-06 ASSESSMENT — PAIN SCALES - GENERAL: PAINLEVEL_OUTOF10: 0-NO PAIN

## 2025-02-06 NOTE — PROGRESS NOTES
Cancer History:  DIAGNOSIS: Pancreas cancer     STAGING: IV     CURRENT SITES OF DISEASE:  Pancreas, liver     MOLECULAR/GENOMICS:  EGFR mutation     CURRENT THERAPY:  afatinib; locally with Dr. Conway at [facility].       PREVIOUS THERAPY:  10/5/22--10/19/22: s/p 2 cycles FOLFIRINOX. Discontinued due to intractable nausea vomiting diarrhea and colitis  11/16/22 --May 2023: Gemcitabine plus Abraxane day 1 day 15  7/10/23: S/p distal pancreatectomy + placement of Medtronic pump for ABEL   Complicated pump with bleeding 11/2023.     TUMOR MARKER:     9/2022: 661  11/2022: 101 (after 2 cycles FOLFIRINOX)    Ca19-9: 220     ONCOLOGIC ISSUES:     PROVIDERS:  Primary medical oncologist: Dr. Abram Tolentino & Dr. Conway at [facility]  Surgical oncologist: Dr. Norberto Lester     HISTORY:   8/2022: Developed mid epigastric pain and discomfort  9/2/22: CT abdomen pelvis without contrast showed mass in the pancreatic head measuring 2.2 cm with suspicious numerous lesions in the liver and a peripancreatic lymph node.    9/15/2022: MRI abdomen confirmed mass in pancreatic body, dilation of pancreatic duct distal to the mass, liver showed numerous lesions.  9/23/2022: Underwent EUS with biopsy, FNA showed adenocarcinoma of the liver and of the pancreas mass consistent with pancreatic primary.     PMH: HLD     FH: No family history of malignancy     SH: , lives with wife, works full-time.  No tobacco, EtOH, illicit     History of Present Illness:   Patient continues to gain weight, up to 188 lbs today. Appetite has been good. Patient still has his port in place.    Does have some right shoulder discomfort that is chronic. Blood pressure slightly higher today.     No fevers, chills, chest pain, shortness of breath, abdominal pain, nausea, vomiting, rashes or masses.     Most concerned today about afatinib is expensive and costing him $12K month because family household income is too high - he will have Medicaid start in  one month.     ROS as above. Remainder of 10 point review of systems elicited and otherwise unremarkable.    Objective:  /89 (BP Location: Right arm)   Pulse 70   Temp 36.4 °C (97.5 °F)   Resp 16   Wt 85.5 kg (188 lb 9.6 oz)   SpO2 96%   BMI 28.68 kg/m²      Physical exam  GENERAL: Well appearing, in no apparent distress  HEENT: No cervical or supraclavicular adenopathy.  CV: Regular rate and rhythm, Normal S1, S2, no murmurs/rubs/gallops  RESP: Normal work of breathing, CTAB without wheeze or crackles  ABD: Normoactive bowel sounds. Soft, nontender, nondistended.  EXT: Warm and well perfused, no edema  NEURO: A&O x 3, normal gait  SKIN: No visible rashes or bruising.  PSYCH: Normal affect.      ECOG Performance Status: 1    Assessment & Plan:  Mr. Foley is a 54 y.o.  with metastatic pancreatic adenocarcinoma to the liver diagnosed in September 2022. He presents for follow up.     Briefly, the patient was diagnosed in September 2022 with Denovo metastatic disease to the liver.    He received 2 cycles of frontline chemotherapy with FOLFIRINOX, which were unfortunately complicated by colitis necessitating hospitalization.  Of note, there seems to  been a response to FOLFIRINOX as his baseline CA 19-9 of 661 down trended to 101.  His chemotherapy regimen was changed to gemcitabine plus Abraxane, which he is overall tolerating well on a day 1 day 15 schedule.  I have personally reviewed the images  of his recent PET CT and MRI liver, which show excellent response to lesions in his liver and minimal residual activity in the pancreas.     I discussed with the patient and his wife that hepatic artery infusion pump therapy is not a standard treatment for metastatic pancreatic adenocarcinoma, because in general this disease is very aggressive and time off of chemotherapy for surgical placement  of pump and resection of pancreas tumor will likely result in progression of disease.  Additionally, it is not  well-established if intrahepatic chemotherapy is effective for this disease.  There is an ongoing clinical trial in [place] specifically for  hepatic artery infusion pump therapy for patients with metastatic pancreatic adenocarcinoma to the liver, which could be considered if he is interested.     He recently met with Dr. Urbina in [place] about the clinical trial, and he would like to proceed with this. I have disucssed the option of having systemic chemotherapy & heparin saline done here locally, and I am more than happy to manage this, but  we need to work with the clinical trial team at [place] to be sure this does not violate the protocol. I will keep working with the team for this.      7/24/23: Underwent distal pancreatectomy on 7/10/23 wtih Dr. Azael Urbina in [place], and had placement of pump for ABEL chemotherapy at the same time. Based on the patient's description, a Medtronic pump was placed, which unfortunately requires a different  set up for access and programming than the Intera pump. I was not aware prior to his surgery that a Medtronic would be placed, and I will have to sort out whether or not there is a department here that has a Medtronic set up for other indications that  I could coordinate this with.      9/25/23: Recently admitted for prolonged period with intra-abd hematoma. Most recently had this drained at [place]. He is scheduled for FUDR via Medtronic ABEL Pump at [place] on 10/9/23 and is hoping to get his pump emptied/refilled with saline on 10/23  here. I am not sure that we will have the capability to manage his Medtronic pump, as we are not set up for this. He would like his local oncologist Dr. Conway at [facility] to manage his gem/Abraxane.     10/16/23: continues to have drain in abdominal abscess. Scheduled for FUDR via ABEL on 10/23 in [place] and gem/Abraxane on 10/30 at [facility] with Dr. Conway.  RTC approx 1 mo virtual visit     6/4/24: Doing well on single-agent  gemcitabine, he will reach out to my office when he is ready to discuss possible clinical trials.    Switched to cape/iri in 7/2024 but had PD ~8/6/2024 Hospitalized with sepsis.  Until 8/17/24     NGS shows pathogenic EGFR mutation as only clear .  Will plan to treat with afatinib and look for other potential clinical trial opportunities.  We discussed [employer] program which he will look into.     Follow up in 2-4 weeks with baseline CT c/a/p and new labs (CBC, Comp, ) hope to start afatinib around that time. Follow up in 4 weeks for tox check.      2/3/25: CT CAP with improvement in tumor burden within liver and lymph nodes, sclerosis of the bones. Continue afatinib. Will discuss with pharmacist for financial assistance. Follow-up in 4-6 weeks.    Allan Melissa MD  Medical Oncology Fellow  2/6/2025

## 2025-03-06 ENCOUNTER — OFFICE VISIT (OUTPATIENT)
Dept: HEMATOLOGY/ONCOLOGY | Facility: HOSPITAL | Age: 55
End: 2025-03-06
Payer: MEDICARE

## 2025-03-06 ENCOUNTER — LAB (OUTPATIENT)
Dept: HEMATOLOGY/ONCOLOGY | Facility: HOSPITAL | Age: 55
End: 2025-03-06
Payer: MEDICARE

## 2025-03-06 VITALS
RESPIRATION RATE: 20 BRPM | HEART RATE: 85 BPM | OXYGEN SATURATION: 96 % | WEIGHT: 188.27 LBS | DIASTOLIC BLOOD PRESSURE: 99 MMHG | BODY MASS INDEX: 28.63 KG/M2 | SYSTOLIC BLOOD PRESSURE: 150 MMHG | TEMPERATURE: 97.9 F

## 2025-03-06 DIAGNOSIS — C25.1 MALIGNANT NEOPLASM OF BODY OF PANCREAS (MULTI): ICD-10-CM

## 2025-03-06 DIAGNOSIS — C78.7 MALIGNANT NEOPLASM METASTATIC TO LIVER (MULTI): ICD-10-CM

## 2025-03-06 LAB
ALBUMIN SERPL BCP-MCNC: 4 G/DL (ref 3.4–5)
ALP SERPL-CCNC: 199 U/L (ref 33–120)
ALT SERPL W P-5'-P-CCNC: 12 U/L (ref 10–52)
ANION GAP SERPL CALC-SCNC: 12 MMOL/L (ref 10–20)
AST SERPL W P-5'-P-CCNC: 12 U/L (ref 9–39)
BASOPHILS # BLD AUTO: 0.08 X10*3/UL (ref 0–0.1)
BASOPHILS NFR BLD AUTO: 0.7 %
BILIRUB SERPL-MCNC: 0.6 MG/DL (ref 0–1.2)
BUN SERPL-MCNC: 10 MG/DL (ref 6–23)
CALCIUM SERPL-MCNC: 9.4 MG/DL (ref 8.6–10.3)
CANCER AG19-9 SERPL-ACNC: 110.19 U/ML
CHLORIDE SERPL-SCNC: 104 MMOL/L (ref 98–107)
CO2 SERPL-SCNC: 29 MMOL/L (ref 21–32)
CREAT SERPL-MCNC: 0.78 MG/DL (ref 0.5–1.3)
EGFRCR SERPLBLD CKD-EPI 2021: >90 ML/MIN/1.73M*2
EOSINOPHIL # BLD AUTO: 0.24 X10*3/UL (ref 0–0.7)
EOSINOPHIL NFR BLD AUTO: 2.2 %
ERYTHROCYTE [DISTWIDTH] IN BLOOD BY AUTOMATED COUNT: 15 % (ref 11.5–14.5)
GLUCOSE SERPL-MCNC: 153 MG/DL (ref 74–99)
HCT VFR BLD AUTO: 41.4 % (ref 41–52)
HGB BLD-MCNC: 14.1 G/DL (ref 13.5–17.5)
IMM GRANULOCYTES # BLD AUTO: 0.04 X10*3/UL (ref 0–0.7)
IMM GRANULOCYTES NFR BLD AUTO: 0.4 % (ref 0–0.9)
LYMPHOCYTES # BLD AUTO: 3.83 X10*3/UL (ref 1.2–4.8)
LYMPHOCYTES NFR BLD AUTO: 35.6 %
MCH RBC QN AUTO: 30.3 PG (ref 26–34)
MCHC RBC AUTO-ENTMCNC: 34.1 G/DL (ref 32–36)
MCV RBC AUTO: 89 FL (ref 80–100)
MONOCYTES # BLD AUTO: 1.21 X10*3/UL (ref 0.1–1)
MONOCYTES NFR BLD AUTO: 11.2 %
NEUTROPHILS # BLD AUTO: 5.37 X10*3/UL (ref 1.2–7.7)
NEUTROPHILS NFR BLD AUTO: 49.9 %
NRBC BLD-RTO: 0 /100 WBCS (ref 0–0)
PLATELET # BLD AUTO: 322 X10*3/UL (ref 150–450)
POTASSIUM SERPL-SCNC: 3.7 MMOL/L (ref 3.5–5.3)
PROT SERPL-MCNC: 6.8 G/DL (ref 6.4–8.2)
RBC # BLD AUTO: 4.65 X10*6/UL (ref 4.5–5.9)
SODIUM SERPL-SCNC: 141 MMOL/L (ref 136–145)
WBC # BLD AUTO: 10.8 X10*3/UL (ref 4.4–11.3)

## 2025-03-06 PROCEDURE — 85025 COMPLETE CBC W/AUTO DIFF WBC: CPT

## 2025-03-06 PROCEDURE — 99215 OFFICE O/P EST HI 40 MIN: CPT | Performed by: NURSE PRACTITIONER

## 2025-03-06 PROCEDURE — 80053 COMPREHEN METABOLIC PANEL: CPT

## 2025-03-06 PROCEDURE — 86301 IMMUNOASSAY TUMOR CA 19-9: CPT

## 2025-03-06 PROCEDURE — 2500000004 HC RX 250 GENERAL PHARMACY W/ HCPCS (ALT 636 FOR OP/ED): Performed by: INTERNAL MEDICINE

## 2025-03-06 PROCEDURE — 36591 DRAW BLOOD OFF VENOUS DEVICE: CPT

## 2025-03-06 RX ORDER — HEPARIN SODIUM,PORCINE/PF 10 UNIT/ML
50 SYRINGE (ML) INTRAVENOUS AS NEEDED
OUTPATIENT
Start: 2025-03-06

## 2025-03-06 RX ORDER — HEPARIN 100 UNIT/ML
500 SYRINGE INTRAVENOUS AS NEEDED
Status: DISCONTINUED | OUTPATIENT
Start: 2025-03-06 | End: 2025-03-06 | Stop reason: HOSPADM

## 2025-03-06 RX ORDER — HEPARIN 100 UNIT/ML
500 SYRINGE INTRAVENOUS AS NEEDED
OUTPATIENT
Start: 2025-03-06

## 2025-03-06 RX ADMIN — HEPARIN 500 UNITS: 100 SYRINGE at 13:04

## 2025-03-06 ASSESSMENT — PAIN SCALES - GENERAL: PAINLEVEL_OUTOF10: 0-NO PAIN

## 2025-03-06 NOTE — PROGRESS NOTES
Cancer History:  DIAGNOSIS: Pancreas cancer     STAGING: IV     CURRENT SITES OF DISEASE:  Pancreas, liver     MOLECULAR/GENOMICS:  EGFR mutation     CURRENT THERAPY:  afatinib;     PREVIOUS THERAPY:  10/5/22--10/19/22: s/p 2 cycles FOLFIRINOX. Discontinued due to intractable nausea vomiting diarrhea and colitis  11/16/22 --May 2023: Gemcitabine plus Abraxane day 1 day 15  7/10/23: S/p distal pancreatectomy + placement of Medtronic pump for ABEL   Complicated pump with bleeding 11/2023.     TUMOR MARKER:     9/2022: 661  11/2022: 101 (after 2 cycles FOLFIRINOX)    Ca19-9: 220     ONCOLOGIC ISSUES:     PROVIDERS:  Primary medical oncologist: Dr. Abram Tolentino & Dr. Conway at [facility]  Surgical oncologist: Dr. Norberto Lester     HISTORY:   8/2022: Developed mid epigastric pain and discomfort  9/2/22: CT abdomen pelvis without contrast showed mass in the pancreatic head measuring 2.2 cm with suspicious numerous lesions in the liver and a peripancreatic lymph node.    9/15/2022: MRI abdomen confirmed mass in pancreatic body, dilation of pancreatic duct distal to the mass, liver showed numerous lesions.  9/23/2022: Underwent EUS with biopsy, FNA showed adenocarcinoma of the liver and of the pancreas mass consistent with pancreatic primary.     PMH: HLD     FH: No family history of malignancy     SH: , lives with wife, works full-time.  No tobacco, EtOH, illicit     History of Present Illness:   Mr. Foley is seen in follow up.   Overall feels great - continues to gain wt.   + heartburn  -   tums helps somewhat     - was on omeprazole  previously , however cannot take on afatinib     Trying to stay active   - getting out  Little bit of acne rash on face    - no diarrhea   Still on fentanyl patch with oxycodone for breakthrough    + fatigue  / sleeps a lot   Judy Zabala NP at [facility] is part of pall care team following & prescribing opioids       4/23 - 5/6  - vacation  - will need early refill of afatinib      Objective:  There were no vitals taken for this visit.     Physical exam  GENERAL: Well appearing, in no apparent distress  HEENT: No cervical or supraclavicular adenopathy.  CV: Regular rate and rhythm, Normal S1, S2, no murmurs/rubs/gallops  RESP: Normal work of breathing, CTAB without wheeze or crackles  ABD: Normoactive bowel sounds. Soft, nontender, nondistended.  EXT: Warm and well perfused, no edema  NEURO: A&O x 3, normal gait  SKIN: Small acne-form rash on face   PSYCH: Normal affect.      ECOG Performance Status: 1    WBC   Date/Time Value Ref Range Status   03/06/2025 01:04 PM 10.8 4.4 - 11.3 x10*3/uL Final   01/30/2025 10:22 AM 10.1 4.4 - 11.3 x10*3/uL Final   11/06/2024 01:07 PM 11.0 4.4 - 11.3 x10*3/uL Final     Hemoglobin   Date Value Ref Range Status   03/06/2025 14.1 13.5 - 17.5 g/dL Final   01/30/2025 15.6 13.5 - 17.5 g/dL Final   11/06/2024 12.6 (L) 13.5 - 17.5 g/dL Final     MCV   Date/Time Value Ref Range Status   03/06/2025 01:04 PM 89 80 - 100 fL Final   01/30/2025 10:22 AM 90 80 - 100 fL Final   11/06/2024 01:07 PM 95 80 - 100 fL Final     Platelets   Date/Time Value Ref Range Status   03/06/2025 01:04  150 - 450 x10*3/uL Final   01/30/2025 10:22  150 - 450 x10*3/uL Final   11/06/2024 01:07  150 - 450 x10*3/uL Final     Neutrophils Absolute   Date/Time Value Ref Range Status   03/06/2025 01:04 PM 5.37 1.20 - 7.70 x10*3/uL Final     Comment:     Percent differential counts (%) should be interpreted in the context of the absolute cell counts (cells/uL).   01/30/2025 10:22 AM 4.42 1.20 - 7.70 x10*3/uL Final     Comment:     Percent differential counts (%) should be interpreted in the context of the absolute cell counts (cells/uL).   11/06/2024 01:07 PM 6.15 1.20 - 7.70 x10*3/uL Final     Comment:     Percent differential counts (%) should be interpreted in the context of the absolute cell counts (cells/uL).     Bilirubin, Total   Date/Time Value Ref Range Status  "  03/06/2025 01:04 PM 0.6 0.0 - 1.2 mg/dL Final   01/30/2025 10:22 AM 0.7 0.0 - 1.2 mg/dL Final   11/06/2024 01:07 PM 0.5 0.0 - 1.2 mg/dL Final     AST   Date/Time Value Ref Range Status   03/06/2025 01:04 PM 12 9 - 39 U/L Final   01/30/2025 10:22 AM 18 9 - 39 U/L Final   11/06/2024 01:07 PM 20 9 - 39 U/L Final     ALT   Date/Time Value Ref Range Status   03/06/2025 01:04 PM 12 10 - 52 U/L Final     Comment:     Patients treated with Sulfasalazine may generate falsely decreased results for ALT.   01/30/2025 10:22 AM 14 10 - 52 U/L Final     Comment:     Patients treated with Sulfasalazine may generate falsely decreased results for ALT.   11/06/2024 01:07 PM 13 10 - 52 U/L Final     Comment:     Patients treated with Sulfasalazine may generate falsely decreased results for ALT.     Creatinine   Date/Time Value Ref Range Status   03/06/2025 01:04 PM 0.78 0.50 - 1.30 mg/dL Final   01/30/2025 10:22 AM 0.83 0.50 - 1.30 mg/dL Final   11/06/2024 01:07 PM 0.79 0.50 - 1.30 mg/dL Final     Urea Nitrogen   Date/Time Value Ref Range Status   03/06/2025 01:04 PM 10 6 - 23 mg/dL Final   01/30/2025 10:22 AM 12 6 - 23 mg/dL Final   11/06/2024 01:07 PM 12 6 - 23 mg/dL Final     Albumin   Date/Time Value Ref Range Status   03/06/2025 01:04 PM 4.0 3.4 - 5.0 g/dL Final   01/30/2025 10:22 AM 4.3 3.4 - 5.0 g/dL Final   11/06/2024 01:07 PM 3.9 3.4 - 5.0 g/dL Final     Cancer AG 19-9   Date/Time Value Ref Range Status   03/06/2025 01:04 .19 (H) <35.00 U/mL Final     No results found for: \"CEA\"     Assessment & Plan:  Mr. Foley is a 54 y.o.  with metastatic pancreatic adenocarcinoma to the liver diagnosed in September 2022. He presents for follow up.     Briefly, the patient was diagnosed in September 2022 with Denovo metastatic disease to the liver.    He received 2 cycles of frontline chemotherapy with FOLFIRINOX, which were unfortunately complicated by colitis necessitating hospitalization.  Of note, there seems to  been a response " to FOLFIRINOX as his baseline CA 19-9 of 661 down trended to 101.  His chemotherapy regimen was changed to gemcitabine plus Abraxane, which he is overall tolerating well on a day 1 day 15 schedule.  I have personally reviewed the images  of his recent PET CT and MRI liver, which show excellent response to lesions in his liver and minimal residual activity in the pancreas.     I discussed with the patient and his wife that hepatic artery infusion pump therapy is not a standard treatment for metastatic pancreatic adenocarcinoma, because in general this disease is very aggressive and time off of chemotherapy for surgical placement  of pump and resection of pancreas tumor will likely result in progression of disease.  Additionally, it is not well-established if intrahepatic chemotherapy is effective for this disease.  There is an ongoing clinical trial in [place] specifically for  hepatic artery infusion pump therapy for patients with metastatic pancreatic adenocarcinoma to the liver, which could be considered if he is interested.     He recently met with Dr. Urbina in [place] about the clinical trial, and he would like to proceed with this. I have disucssed the option of having systemic chemotherapy & heparin saline done here locally, and I am more than happy to manage this, but  we need to work with the clinical trial team at [place] to be sure this does not violate the protocol. I will keep working with the team for this.      7/24/23: Underwent distal pancreatectomy on 7/10/23 wt Dr. Azael Urbina in [place], and had placement of pump for ABEL chemotherapy at the same time. Based on the patient's description, a Medtronic pump was placed, which unfortunately requires a different  set up for access and programming than the Intera pump. I was not aware prior to his surgery that a Medtronic would be placed, and I will have to sort out whether or not there is a department here that has a Medtronic set up for other  indications that  I could coordinate this with.      9/25/23: Recently admitted for prolonged period with intra-abd hematoma. Most recently had this drained at [place]. He is scheduled for FUDR via Medtronic ABEL Pump at [place] on 10/9/23 and is hoping to get his pump emptied/refilled with saline on 10/23  here. I am not sure that we will have the capability to manage his Medtronic pump, as we are not set up for this. He would like his local oncologist Dr. Conway at [facility] to manage his gem/Abraxane.     10/16/23: continues to have drain in abdominal abscess. Scheduled for FUDR via ABEL on 10/23 in [place] and gem/Abraxane on 10/30 at [facility] with Dr. Conway.  RTC approx 1 mo virtual visit     6/4/24: Doing well on single-agent gemcitabine, he will reach out to my office when he is ready to discuss possible clinical trials.    Switched to cape/iri in 7/2024 but had PD ~8/6/2024 Hospitalized with sepsis.  Until 8/17/24     NGS shows pathogenic EGFR mutation as only clear .  Will plan to treat with afatinib and look for other potential clinical trial opportunities.  We discussed [employer] program which he will look into.     10/31/24 - started Afatinib                      Significant decline in  level.     2/3/25: CT CAP with improvement in tumor burden within liver and lymph nodes, sclerosis of the bones. Continue afatinib. Will discuss with pharmacist for financial assistance.      3/6/25 - tolerating well.    + heartburn - will check with pharmacist if carafate is OK.   He is transitioning to medicare this month - will see if we need to make any adjustments with afatinib coverage - follow up with PharmD    Follow up in 4 weeks with labs & 8 weeks with scans     Lacey De La Garza CNP  3/6/2025

## 2025-03-10 DIAGNOSIS — C78.7 MALIGNANT NEOPLASM METASTATIC TO LIVER (MULTI): ICD-10-CM

## 2025-03-10 DIAGNOSIS — C25.1 MALIGNANT NEOPLASM OF BODY OF PANCREAS (MULTI): Primary | ICD-10-CM

## 2025-03-20 ENCOUNTER — TELEPHONE (OUTPATIENT)
Dept: HEMATOLOGY/ONCOLOGY | Facility: HOSPITAL | Age: 55
End: 2025-03-20
Payer: COMMERCIAL

## 2025-03-20 DIAGNOSIS — C25.1 MALIGNANT NEOPLASM OF BODY OF PANCREAS (MULTI): ICD-10-CM

## 2025-03-20 DIAGNOSIS — C78.7 MALIGNANT NEOPLASM METASTATIC TO LIVER (MULTI): ICD-10-CM

## 2025-04-03 ENCOUNTER — LAB (OUTPATIENT)
Dept: HEMATOLOGY/ONCOLOGY | Facility: HOSPITAL | Age: 55
End: 2025-04-03
Payer: MEDICARE

## 2025-04-03 ENCOUNTER — OFFICE VISIT (OUTPATIENT)
Dept: HEMATOLOGY/ONCOLOGY | Facility: HOSPITAL | Age: 55
End: 2025-04-03
Payer: MEDICARE

## 2025-04-03 VITALS
TEMPERATURE: 97.7 F | SYSTOLIC BLOOD PRESSURE: 127 MMHG | BODY MASS INDEX: 28.49 KG/M2 | WEIGHT: 187.39 LBS | HEART RATE: 71 BPM | DIASTOLIC BLOOD PRESSURE: 83 MMHG | RESPIRATION RATE: 17 BRPM | OXYGEN SATURATION: 97 %

## 2025-04-03 DIAGNOSIS — C25.9 MALIGNANT NEOPLASM OF PANCREAS, UNSPECIFIED LOCATION OF MALIGNANCY (MULTI): Primary | ICD-10-CM

## 2025-04-03 DIAGNOSIS — C78.7 MALIGNANT NEOPLASM METASTATIC TO LIVER (MULTI): ICD-10-CM

## 2025-04-03 DIAGNOSIS — C25.1 MALIGNANT NEOPLASM OF BODY OF PANCREAS (MULTI): ICD-10-CM

## 2025-04-03 LAB
ALBUMIN SERPL BCP-MCNC: 4.1 G/DL (ref 3.4–5)
ALP SERPL-CCNC: 192 U/L (ref 33–120)
ALT SERPL W P-5'-P-CCNC: 17 U/L (ref 10–52)
ANION GAP SERPL CALC-SCNC: 11 MMOL/L (ref 10–20)
AST SERPL W P-5'-P-CCNC: 17 U/L (ref 9–39)
BASOPHILS # BLD AUTO: 0.06 X10*3/UL (ref 0–0.1)
BASOPHILS NFR BLD AUTO: 0.6 %
BILIRUB SERPL-MCNC: 0.7 MG/DL (ref 0–1.2)
BUN SERPL-MCNC: 10 MG/DL (ref 6–23)
CALCIUM SERPL-MCNC: 9.2 MG/DL (ref 8.6–10.3)
CANCER AG19-9 SERPL-ACNC: 74.97 U/ML
CHLORIDE SERPL-SCNC: 106 MMOL/L (ref 98–107)
CO2 SERPL-SCNC: 26 MMOL/L (ref 21–32)
CREAT SERPL-MCNC: 0.71 MG/DL (ref 0.5–1.3)
EGFRCR SERPLBLD CKD-EPI 2021: >90 ML/MIN/1.73M*2
EOSINOPHIL # BLD AUTO: 0.2 X10*3/UL (ref 0–0.7)
EOSINOPHIL NFR BLD AUTO: 1.9 %
ERYTHROCYTE [DISTWIDTH] IN BLOOD BY AUTOMATED COUNT: 14.4 % (ref 11.5–14.5)
GLUCOSE SERPL-MCNC: 149 MG/DL (ref 74–99)
HCT VFR BLD AUTO: 41.5 % (ref 41–52)
HGB BLD-MCNC: 14 G/DL (ref 13.5–17.5)
IMM GRANULOCYTES # BLD AUTO: 0.03 X10*3/UL (ref 0–0.7)
IMM GRANULOCYTES NFR BLD AUTO: 0.3 % (ref 0–0.9)
LYMPHOCYTES # BLD AUTO: 4.47 X10*3/UL (ref 1.2–4.8)
LYMPHOCYTES NFR BLD AUTO: 41.5 %
MCH RBC QN AUTO: 30.9 PG (ref 26–34)
MCHC RBC AUTO-ENTMCNC: 33.7 G/DL (ref 32–36)
MCV RBC AUTO: 92 FL (ref 80–100)
MONOCYTES # BLD AUTO: 1.11 X10*3/UL (ref 0.1–1)
MONOCYTES NFR BLD AUTO: 10.3 %
NEUTROPHILS # BLD AUTO: 4.89 X10*3/UL (ref 1.2–7.7)
NEUTROPHILS NFR BLD AUTO: 45.4 %
NRBC BLD-RTO: 0 /100 WBCS (ref 0–0)
PLATELET # BLD AUTO: 290 X10*3/UL (ref 150–450)
POTASSIUM SERPL-SCNC: 4 MMOL/L (ref 3.5–5.3)
PROT SERPL-MCNC: 6.6 G/DL (ref 6.4–8.2)
RBC # BLD AUTO: 4.53 X10*6/UL (ref 4.5–5.9)
SODIUM SERPL-SCNC: 139 MMOL/L (ref 136–145)
WBC # BLD AUTO: 10.8 X10*3/UL (ref 4.4–11.3)

## 2025-04-03 PROCEDURE — 3074F SYST BP LT 130 MM HG: CPT | Performed by: NURSE PRACTITIONER

## 2025-04-03 PROCEDURE — 99213 OFFICE O/P EST LOW 20 MIN: CPT | Performed by: NURSE PRACTITIONER

## 2025-04-03 PROCEDURE — 3079F DIAST BP 80-89 MM HG: CPT | Performed by: NURSE PRACTITIONER

## 2025-04-03 PROCEDURE — 85025 COMPLETE CBC W/AUTO DIFF WBC: CPT

## 2025-04-03 PROCEDURE — 36591 DRAW BLOOD OFF VENOUS DEVICE: CPT

## 2025-04-03 PROCEDURE — 86301 IMMUNOASSAY TUMOR CA 19-9: CPT

## 2025-04-03 PROCEDURE — 84075 ASSAY ALKALINE PHOSPHATASE: CPT

## 2025-04-03 PROCEDURE — 2500000004 HC RX 250 GENERAL PHARMACY W/ HCPCS (ALT 636 FOR OP/ED): Performed by: INTERNAL MEDICINE

## 2025-04-03 RX ORDER — HEPARIN 100 UNIT/ML
500 SYRINGE INTRAVENOUS AS NEEDED
Status: DISCONTINUED | OUTPATIENT
Start: 2025-04-03 | End: 2025-04-03 | Stop reason: HOSPADM

## 2025-04-03 RX ORDER — SUCRALFATE 1 G/10ML
1 SUSPENSION ORAL EVERY 6 HOURS PRN
Qty: 473 ML | Refills: 1 | Status: SHIPPED | OUTPATIENT
Start: 2025-04-03 | End: 2025-05-03

## 2025-04-03 RX ORDER — HEPARIN SODIUM,PORCINE/PF 10 UNIT/ML
50 SYRINGE (ML) INTRAVENOUS AS NEEDED
OUTPATIENT
Start: 2025-04-03

## 2025-04-03 RX ORDER — HEPARIN 100 UNIT/ML
500 SYRINGE INTRAVENOUS AS NEEDED
OUTPATIENT
Start: 2025-04-03

## 2025-04-03 RX ADMIN — HEPARIN 500 UNITS: 100 SYRINGE at 08:47

## 2025-04-03 ASSESSMENT — PAIN SCALES - GENERAL: PAINLEVEL_OUTOF10: 0-NO PAIN

## 2025-04-03 NOTE — PROGRESS NOTES
Cancer History:  DIAGNOSIS: Pancreas cancer     STAGING: IV     CURRENT SITES OF DISEASE:  Pancreas, liver     MOLECULAR/GENOMICS:  EGFR mutation     CURRENT THERAPY:  afatinib;     PREVIOUS THERAPY:  10/5/22--10/19/22: s/p 2 cycles FOLFIRINOX. Discontinued due to intractable nausea vomiting diarrhea and colitis  11/16/22 --May 2023: Gemcitabine plus Abraxane day 1 day 15  7/10/23: S/p distal pancreatectomy + placement of Medtronic pump for ABEL   Complicated pump with bleeding 11/2023.     TUMOR MARKER:     9/2022: 661  11/2022: 101 (after 2 cycles FOLFIRINOX)    Ca19-9: 220     ONCOLOGIC ISSUES:     PROVIDERS:  Primary medical oncologist: Dr. Abram Tolentino & Dr. Conway at [facility]  Surgical oncologist: Dr. Norberto Lester     HISTORY:   8/2022: Developed mid epigastric pain and discomfort  9/2/22: CT abdomen pelvis without contrast showed mass in the pancreatic head measuring 2.2 cm with suspicious numerous lesions in the liver and a peripancreatic lymph node.    9/15/2022: MRI abdomen confirmed mass in pancreatic body, dilation of pancreatic duct distal to the mass, liver showed numerous lesions.  9/23/2022: Underwent EUS with biopsy, FNA showed adenocarcinoma of the liver and of the pancreas mass consistent with pancreatic primary.     PMH: HLD     FH: No family history of malignancy     SH: , lives with wife, works full-time.  No tobacco, EtOH, illicit     History of Present Illness:   Mr. Foley is seen in follow up.   Overall feels great - continues to gain wt.   + heartburn  -   tums helps somewhat     - was on omeprazole  previously , however cannot take on afatinib     Trying to stay active   - getting out  Little bit of acne rash on face    - no diarrhea   Still on fentanyl patch with oxycodone for breakthrough  - was able to get down to 1    + fatigue  / sleeps a lot    - but staying active, gets out & does hiking.     Judy Zabala NP at [facility] is part of pall care team following &  prescribing opioids       4/23 - 5/6  - vacation  - will need early refill of afatinib     Objective:  /83 (BP Location: Right arm, Patient Position: Sitting, BP Cuff Size: Large adult)   Pulse 71   Temp 36.5 °C (97.7 °F) (Skin)   Resp 17   Wt 85 kg (187 lb 6.3 oz)   SpO2 97%   BMI 28.49 kg/m²      Physical exam  GENERAL: Well appearing, in no apparent distress  HEENT: No cervical or supraclavicular adenopathy.  CV: Regular rate and rhythm, Normal S1, S2, no murmurs/rubs/gallops  RESP: Normal work of breathing, CTAB without wheeze or crackles  ABD: Normoactive bowel sounds. Soft, nontender, nondistended.  EXT: Warm and well perfused, no edema  NEURO: A&O x 3, normal gait  SKIN: Small acne-form rash on face   PSYCH: Normal affect.      ECOG Performance Status: 1    WBC   Date/Time Value Ref Range Status   04/03/2025 08:46 AM 10.8 4.4 - 11.3 x10*3/uL Final   03/06/2025 01:04 PM 10.8 4.4 - 11.3 x10*3/uL Final   01/30/2025 10:22 AM 10.1 4.4 - 11.3 x10*3/uL Final     Hemoglobin   Date Value Ref Range Status   04/03/2025 14.0 13.5 - 17.5 g/dL Final   03/06/2025 14.1 13.5 - 17.5 g/dL Final   01/30/2025 15.6 13.5 - 17.5 g/dL Final     MCV   Date/Time Value Ref Range Status   04/03/2025 08:46 AM 92 80 - 100 fL Final   03/06/2025 01:04 PM 89 80 - 100 fL Final   01/30/2025 10:22 AM 90 80 - 100 fL Final     Platelets   Date/Time Value Ref Range Status   04/03/2025 08:46  150 - 450 x10*3/uL Final   03/06/2025 01:04  150 - 450 x10*3/uL Final   01/30/2025 10:22  150 - 450 x10*3/uL Final     Neutrophils Absolute   Date/Time Value Ref Range Status   04/03/2025 08:46 AM 4.89 1.20 - 7.70 x10*3/uL Final     Comment:     Percent differential counts (%) should be interpreted in the context of the absolute cell counts (cells/uL).   03/06/2025 01:04 PM 5.37 1.20 - 7.70 x10*3/uL Final     Comment:     Percent differential counts (%) should be interpreted in the context of the absolute cell counts (cells/uL).  "  01/30/2025 10:22 AM 4.42 1.20 - 7.70 x10*3/uL Final     Comment:     Percent differential counts (%) should be interpreted in the context of the absolute cell counts (cells/uL).     Bilirubin, Total   Date/Time Value Ref Range Status   04/03/2025 08:46 AM 0.7 0.0 - 1.2 mg/dL Final   03/06/2025 01:04 PM 0.6 0.0 - 1.2 mg/dL Final   01/30/2025 10:22 AM 0.7 0.0 - 1.2 mg/dL Final     AST   Date/Time Value Ref Range Status   04/03/2025 08:46 AM 17 9 - 39 U/L Final   03/06/2025 01:04 PM 12 9 - 39 U/L Final   01/30/2025 10:22 AM 18 9 - 39 U/L Final     ALT   Date/Time Value Ref Range Status   04/03/2025 08:46 AM 17 10 - 52 U/L Final     Comment:     Patients treated with Sulfasalazine may generate falsely decreased results for ALT.   03/06/2025 01:04 PM 12 10 - 52 U/L Final     Comment:     Patients treated with Sulfasalazine may generate falsely decreased results for ALT.   01/30/2025 10:22 AM 14 10 - 52 U/L Final     Comment:     Patients treated with Sulfasalazine may generate falsely decreased results for ALT.     Creatinine   Date/Time Value Ref Range Status   04/03/2025 08:46 AM 0.71 0.50 - 1.30 mg/dL Final   03/06/2025 01:04 PM 0.78 0.50 - 1.30 mg/dL Final   01/30/2025 10:22 AM 0.83 0.50 - 1.30 mg/dL Final     Urea Nitrogen   Date/Time Value Ref Range Status   04/03/2025 08:46 AM 10 6 - 23 mg/dL Final   03/06/2025 01:04 PM 10 6 - 23 mg/dL Final   01/30/2025 10:22 AM 12 6 - 23 mg/dL Final     Albumin   Date/Time Value Ref Range Status   04/03/2025 08:46 AM 4.1 3.4 - 5.0 g/dL Final   03/06/2025 01:04 PM 4.0 3.4 - 5.0 g/dL Final   01/30/2025 10:22 AM 4.3 3.4 - 5.0 g/dL Final     Cancer AG 19-9   Date/Time Value Ref Range Status   03/06/2025 01:04 .19 (H) <35.00 U/mL Final     No results found for: \"CEA\"     Assessment & Plan:  Mr. Foley is a 54 y.o.  with metastatic pancreatic adenocarcinoma to the liver diagnosed in September 2022. He presents for follow up.       Briefly, the patient was diagnosed in September " 2022 with Denovo metastatic disease to the liver.    He received 2 cycles of frontline chemotherapy with FOLFIRINOX, which were unfortunately complicated by colitis necessitating hospitalization.  Of note, there seems to  been a response to FOLFIRINOX as his baseline CA 19-9 of 661 down trended to 101.  His chemotherapy regimen was changed to gemcitabine plus Abraxane, which he is overall tolerating well on a day 1 day 15 schedule.  I have personally reviewed the images  of his recent PET CT and MRI liver, which show excellent response to lesions in his liver and minimal residual activity in the pancreas.     I discussed with the patient and his wife that hepatic artery infusion pump therapy is not a standard treatment for metastatic pancreatic adenocarcinoma, because in general this disease is very aggressive and time off of chemotherapy for surgical placement  of pump and resection of pancreas tumor will likely result in progression of disease.  Additionally, it is not well-established if intrahepatic chemotherapy is effective for this disease.  There is an ongoing clinical trial in [place] specifically for  hepatic artery infusion pump therapy for patients with metastatic pancreatic adenocarcinoma to the liver, which could be considered if he is interested.     He recently met with Dr. Urbina in [place] about the clinical trial, and he would like to proceed with this. I have disucssed the option of having systemic chemotherapy & heparin saline done here locally, and I am more than happy to manage this, but  we need to work with the clinical trial team at [place] to be sure this does not violate the protocol. I will keep working with the team for this.      7/24/23: Underwent distal pancreatectomy on 7/10/23 [facility] Dr. Azael Urbina in [place], and had placement of pump for ABEL chemotherapy at the same time. Based on the patient's description, a Medtronic pump was placed, which unfortunately requires a different   set up for access and programming than the Intera pump. I was not aware prior to his surgery that a Medtronic would be placed, and I will have to sort out whether or not there is a department here that has a Medtronic set up for other indications that  I could coordinate this with.      9/25/23: Recently admitted for prolonged period with intra-abd hematoma. Most recently had this drained at [place]. He is scheduled for FUDR via Medtronic ABEL Pump at [place] on 10/9/23 and is hoping to get his pump emptied/refilled with saline on 10/23  here. I am not sure that we will have the capability to manage his Medtronic pump, as we are not set up for this. He would like his local oncologist Dr. Conway at [facility] to manage his gem/Abraxane.     10/16/23: continues to have drain in abdominal abscess. Scheduled for FUDR via ABEL on 10/23 in [place] and gem/Abraxane on 10/30 at [facility] with Dr. Conway.  RTC approx 1 mo virtual visit     6/4/24: Doing well on single-agent gemcitabine, he will reach out to my office when he is ready to discuss possible clinical trials.    Switched to cape/iri in 7/2024 but had PD ~8/6/2024 Hospitalized with sepsis.  Until 8/17/24     NGS shows pathogenic EGFR mutation as only clear .  Will plan to treat with afatinib and look for other potential clinical trial opportunities.  We discussed [employer] program which he will look into.     10/31/24 - started Afatinib                      Significant decline in  level.     2/3/25: CT CAP with improvement in tumor burden within liver and lymph nodes, sclerosis of the bones. Continue afatinib. Will discuss with pharmacist for financial assistance.      4/3/25 - tolerating well.    + heartburn - OK to take carafate - prescription sent in.    He is transitioning to medicare this month - no adjustments with afatinib coverage per pharm D     Follow up in 4  - 6 weeks with labs & scans     Pall Care at [facility] prescribing pain meds     Lacey De La Garza,  CNP  4/3/2025

## 2025-05-12 ENCOUNTER — HOSPITAL ENCOUNTER (OUTPATIENT)
Dept: RADIOLOGY | Facility: CLINIC | Age: 55
Discharge: HOME | End: 2025-05-12
Payer: MEDICARE

## 2025-05-12 DIAGNOSIS — C25.1 MALIGNANT NEOPLASM OF BODY OF PANCREAS (MULTI): ICD-10-CM

## 2025-05-12 PROCEDURE — 71260 CT THORAX DX C+: CPT

## 2025-05-12 PROCEDURE — 2550000001 HC RX 255 CONTRASTS: Mod: JW | Performed by: NURSE PRACTITIONER

## 2025-05-12 RX ADMIN — IOHEXOL 70 ML: 350 INJECTION, SOLUTION INTRAVENOUS at 10:04

## 2025-05-15 ENCOUNTER — OFFICE VISIT (OUTPATIENT)
Dept: HEMATOLOGY/ONCOLOGY | Facility: HOSPITAL | Age: 55
End: 2025-05-15
Payer: MEDICARE

## 2025-05-15 ENCOUNTER — LAB (OUTPATIENT)
Dept: HEMATOLOGY/ONCOLOGY | Facility: HOSPITAL | Age: 55
End: 2025-05-15
Payer: MEDICARE

## 2025-05-15 VITALS
BODY MASS INDEX: 27.99 KG/M2 | OXYGEN SATURATION: 95 % | HEART RATE: 97 BPM | WEIGHT: 184.08 LBS | SYSTOLIC BLOOD PRESSURE: 115 MMHG | RESPIRATION RATE: 20 BRPM | TEMPERATURE: 97.2 F | DIASTOLIC BLOOD PRESSURE: 76 MMHG

## 2025-05-15 DIAGNOSIS — C25.1 MALIGNANT NEOPLASM OF BODY OF PANCREAS (MULTI): ICD-10-CM

## 2025-05-15 DIAGNOSIS — C78.7 MALIGNANT NEOPLASM METASTATIC TO LIVER (MULTI): ICD-10-CM

## 2025-05-15 DIAGNOSIS — C25.9 MALIGNANT NEOPLASM OF PANCREAS, UNSPECIFIED LOCATION OF MALIGNANCY (MULTI): ICD-10-CM

## 2025-05-15 DIAGNOSIS — C78.7 MALIGNANT NEOPLASM METASTATIC TO LIVER (MULTI): Primary | ICD-10-CM

## 2025-05-15 LAB
ALBUMIN SERPL BCP-MCNC: 3.9 G/DL (ref 3.4–5)
ALP SERPL-CCNC: 292 U/L (ref 33–120)
ALT SERPL W P-5'-P-CCNC: 30 U/L (ref 10–52)
ANION GAP SERPL CALC-SCNC: 18 MMOL/L (ref 10–20)
AST SERPL W P-5'-P-CCNC: 30 U/L (ref 9–39)
BASOPHILS # BLD AUTO: 0.06 X10*3/UL (ref 0–0.1)
BASOPHILS NFR BLD AUTO: 0.7 %
BILIRUB SERPL-MCNC: 0.7 MG/DL (ref 0–1.2)
BUN SERPL-MCNC: 12 MG/DL (ref 6–23)
CALCIUM SERPL-MCNC: 8.6 MG/DL (ref 8.6–10.3)
CANCER AG19-9 SERPL-ACNC: 202.89 U/ML
CHLORIDE SERPL-SCNC: 102 MMOL/L (ref 98–107)
CO2 SERPL-SCNC: 20 MMOL/L (ref 21–32)
CREAT SERPL-MCNC: 0.89 MG/DL (ref 0.5–1.3)
EGFRCR SERPLBLD CKD-EPI 2021: >90 ML/MIN/1.73M*2
EOSINOPHIL # BLD AUTO: 0.08 X10*3/UL (ref 0–0.7)
EOSINOPHIL NFR BLD AUTO: 0.9 %
ERYTHROCYTE [DISTWIDTH] IN BLOOD BY AUTOMATED COUNT: 15.2 % (ref 11.5–14.5)
GLUCOSE SERPL-MCNC: 157 MG/DL (ref 74–99)
HCT VFR BLD AUTO: 43.7 % (ref 41–52)
HGB BLD-MCNC: 14.7 G/DL (ref 13.5–17.5)
IMM GRANULOCYTES # BLD AUTO: 0.03 X10*3/UL (ref 0–0.7)
IMM GRANULOCYTES NFR BLD AUTO: 0.3 % (ref 0–0.9)
LYMPHOCYTES # BLD AUTO: 2.04 X10*3/UL (ref 1.2–4.8)
LYMPHOCYTES NFR BLD AUTO: 23.2 %
MCH RBC QN AUTO: 31.3 PG (ref 26–34)
MCHC RBC AUTO-ENTMCNC: 33.6 G/DL (ref 32–36)
MCV RBC AUTO: 93 FL (ref 80–100)
MONOCYTES # BLD AUTO: 0.77 X10*3/UL (ref 0.1–1)
MONOCYTES NFR BLD AUTO: 8.7 %
NEUTROPHILS # BLD AUTO: 5.83 X10*3/UL (ref 1.2–7.7)
NEUTROPHILS NFR BLD AUTO: 66.2 %
NRBC BLD-RTO: 0 /100 WBCS (ref 0–0)
PLATELET # BLD AUTO: 321 X10*3/UL (ref 150–450)
POTASSIUM SERPL-SCNC: 3.5 MMOL/L (ref 3.5–5.3)
PROT SERPL-MCNC: 6.9 G/DL (ref 6.4–8.2)
RBC # BLD AUTO: 4.69 X10*6/UL (ref 4.5–5.9)
SODIUM SERPL-SCNC: 136 MMOL/L (ref 136–145)
WBC # BLD AUTO: 8.8 X10*3/UL (ref 4.4–11.3)

## 2025-05-15 PROCEDURE — 2500000004 HC RX 250 GENERAL PHARMACY W/ HCPCS (ALT 636 FOR OP/ED): Mod: JZ | Performed by: INTERNAL MEDICINE

## 2025-05-15 PROCEDURE — 99214 OFFICE O/P EST MOD 30 MIN: CPT | Performed by: INTERNAL MEDICINE

## 2025-05-15 PROCEDURE — 86301 IMMUNOASSAY TUMOR CA 19-9: CPT

## 2025-05-15 PROCEDURE — 1036F TOBACCO NON-USER: CPT | Performed by: INTERNAL MEDICINE

## 2025-05-15 PROCEDURE — 36591 DRAW BLOOD OFF VENOUS DEVICE: CPT

## 2025-05-15 PROCEDURE — 3074F SYST BP LT 130 MM HG: CPT | Performed by: INTERNAL MEDICINE

## 2025-05-15 PROCEDURE — 85025 COMPLETE CBC W/AUTO DIFF WBC: CPT

## 2025-05-15 PROCEDURE — 3078F DIAST BP <80 MM HG: CPT | Performed by: INTERNAL MEDICINE

## 2025-05-15 PROCEDURE — 84075 ASSAY ALKALINE PHOSPHATASE: CPT

## 2025-05-15 RX ORDER — ONDANSETRON 8 MG/1
8 TABLET, ORALLY DISINTEGRATING ORAL EVERY 8 HOURS PRN
COMMUNITY
Start: 2025-04-21

## 2025-05-15 RX ORDER — ALBUTEROL SULFATE 0.83 MG/ML
3 SOLUTION RESPIRATORY (INHALATION) AS NEEDED
OUTPATIENT
Start: 2025-05-23

## 2025-05-15 RX ORDER — HEPARIN 100 UNIT/ML
500 SYRINGE INTRAVENOUS AS NEEDED
Status: CANCELLED | OUTPATIENT
Start: 2025-05-15

## 2025-05-15 RX ORDER — HEPARIN 100 UNIT/ML
500 SYRINGE INTRAVENOUS AS NEEDED
Status: ACTIVE | OUTPATIENT
Start: 2025-05-15

## 2025-05-15 RX ORDER — FAMOTIDINE 10 MG/ML
20 INJECTION, SOLUTION INTRAVENOUS ONCE AS NEEDED
OUTPATIENT
Start: 2025-05-23

## 2025-05-15 RX ORDER — HEPARIN SODIUM,PORCINE/PF 10 UNIT/ML
50 SYRINGE (ML) INTRAVENOUS AS NEEDED
OUTPATIENT
Start: 2025-05-15

## 2025-05-15 RX ORDER — EPINEPHRINE 0.3 MG/.3ML
0.3 INJECTION SUBCUTANEOUS EVERY 5 MIN PRN
OUTPATIENT
Start: 2025-05-23

## 2025-05-15 RX ORDER — AMLODIPINE BESYLATE 10 MG/1
1 TABLET ORAL
COMMUNITY
Start: 2025-01-06

## 2025-05-15 RX ORDER — DIPHENHYDRAMINE HYDROCHLORIDE 50 MG/ML
50 INJECTION, SOLUTION INTRAMUSCULAR; INTRAVENOUS AS NEEDED
OUTPATIENT
Start: 2025-05-23

## 2025-05-15 RX ORDER — ZOLEDRONIC ACID 0.04 MG/ML
4 INJECTION, SOLUTION INTRAVENOUS ONCE
OUTPATIENT
Start: 2025-05-23

## 2025-05-15 RX ORDER — PREGABALIN 75 MG/1
1 CAPSULE ORAL EVERY 6 HOURS
COMMUNITY
Start: 2025-05-08

## 2025-05-15 RX ORDER — HEPARIN 100 UNIT/ML
500 SYRINGE INTRAVENOUS AS NEEDED
Status: DISCONTINUED | OUTPATIENT
Start: 2025-05-15 | End: 2025-05-15 | Stop reason: HOSPADM

## 2025-05-15 RX ORDER — HEPARIN 100 UNIT/ML
500 SYRINGE INTRAVENOUS AS NEEDED
OUTPATIENT
Start: 2025-05-15

## 2025-05-15 RX ADMIN — Medication 500 UNITS: at 12:18

## 2025-05-15 RX ADMIN — HEPARIN 500 UNITS: 100 SYRINGE at 10:38

## 2025-05-15 ASSESSMENT — PAIN SCALES - GENERAL: PAINLEVEL_OUTOF10: 0-NO PAIN

## 2025-05-15 NOTE — PROGRESS NOTES
Cancer History:  DIAGNOSIS: Pancreas cancer     STAGING: IV     CURRENT SITES OF DISEASE:  Pancreas, liver     MOLECULAR/GENOMICS:  EGFR mutation     CURRENT THERAPY:  afatinib;     PREVIOUS THERAPY:  10/5/22--10/19/22: s/p 2 cycles FOLFIRINOX. Discontinued due to intractable nausea vomiting diarrhea and colitis  11/16/22 --May 2023: Gemcitabine plus Abraxane day 1 day 15  7/10/23: S/p distal pancreatectomy + placement of Medtronic pump for ABEL   Complicated pump with bleeding 11/2023.     TUMOR MARKER:     9/2022: 661  11/2022: 101 (after 2 cycles FOLFIRINOX)    Ca19-9: 220     ONCOLOGIC ISSUES:     PROVIDERS:  Primary medical oncologist: Dr. Abram Tolentino & Dr. Conway at [facility]  Surgical oncologist: Dr. Norberto Lester     HISTORY:   8/2022: Developed mid epigastric pain and discomfort  9/2/22: CT abdomen pelvis without contrast showed mass in the pancreatic head measuring 2.2 cm with suspicious numerous lesions in the liver and a peripancreatic lymph node.    9/15/2022: MRI abdomen confirmed mass in pancreatic body, dilation of pancreatic duct distal to the mass, liver showed numerous lesions.  9/23/2022: Underwent EUS with biopsy, FNA showed adenocarcinoma of the liver and of the pancreas mass consistent with pancreatic primary.     PMH: HLD     FH: No family history of malignancy     SH: , lives with wife, works full-time.  No tobacco, EtOH, illicit     History of Present Illness:   Mr. Foley is seen in follow up.   Overall feels great - continues to gain wt.   + heartburn  -   tums helps somewhat     - was on omeprazole  previously , however cannot take on afatinib     Trying to stay active   - getting out  Little bit of acne rash on face    - no diarrhea   Still on fentanyl patch with oxycodone for breakthrough  - was able to get down to 1    + fatigue  / sleeps a lot    - but staying active, gets out & does hiking.     Judy Zabala NP at [facility] is part of pall care team following &  prescribing opioids       4/23 - 5/6  - vacation  - will need early refill of afatinib     Objective:  /76   Pulse 97   Temp 36.2 °C (97.2 °F) (Core)   Resp 20   Wt 83.5 kg (184 lb 1.4 oz)   SpO2 95%   BMI 27.99 kg/m²      Physical exam  GENERAL: Well appearing, in no apparent distress  HEENT: No cervical or supraclavicular adenopathy.  CV: Regular rate and rhythm, Normal S1, S2, no murmurs/rubs/gallops  RESP: Normal work of breathing, CTAB without wheeze or crackles  ABD: Normoactive bowel sounds. Soft, nontender, nondistended.  EXT: Warm and well perfused, no edema  NEURO: A&O x 3, normal gait  SKIN: Small acne-form rash on face   PSYCH: Normal affect.      ECOG Performance Status: 1    WBC   Date/Time Value Ref Range Status   05/15/2025 10:37 AM 8.8 4.4 - 11.3 x10*3/uL Final   04/03/2025 08:46 AM 10.8 4.4 - 11.3 x10*3/uL Final   03/06/2025 01:04 PM 10.8 4.4 - 11.3 x10*3/uL Final     Hemoglobin   Date Value Ref Range Status   05/15/2025 14.7 13.5 - 17.5 g/dL Final   04/03/2025 14.0 13.5 - 17.5 g/dL Final   03/06/2025 14.1 13.5 - 17.5 g/dL Final     MCV   Date/Time Value Ref Range Status   05/15/2025 10:37 AM 93 80 - 100 fL Final   04/03/2025 08:46 AM 92 80 - 100 fL Final   03/06/2025 01:04 PM 89 80 - 100 fL Final     Platelets   Date/Time Value Ref Range Status   05/15/2025 10:37  150 - 450 x10*3/uL Final   04/03/2025 08:46  150 - 450 x10*3/uL Final   03/06/2025 01:04  150 - 450 x10*3/uL Final     Neutrophils Absolute   Date/Time Value Ref Range Status   05/15/2025 10:37 AM 5.83 1.20 - 7.70 x10*3/uL Final     Comment:     Percent differential counts (%) should be interpreted in the context of the absolute cell counts (cells/uL).   04/03/2025 08:46 AM 4.89 1.20 - 7.70 x10*3/uL Final     Comment:     Percent differential counts (%) should be interpreted in the context of the absolute cell counts (cells/uL).   03/06/2025 01:04 PM 5.37 1.20 - 7.70 x10*3/uL Final     Comment:     Percent  "differential counts (%) should be interpreted in the context of the absolute cell counts (cells/uL).     Bilirubin, Total   Date/Time Value Ref Range Status   04/03/2025 08:46 AM 0.7 0.0 - 1.2 mg/dL Final   03/06/2025 01:04 PM 0.6 0.0 - 1.2 mg/dL Final   01/30/2025 10:22 AM 0.7 0.0 - 1.2 mg/dL Final     AST   Date/Time Value Ref Range Status   04/03/2025 08:46 AM 17 9 - 39 U/L Final   03/06/2025 01:04 PM 12 9 - 39 U/L Final   01/30/2025 10:22 AM 18 9 - 39 U/L Final     ALT   Date/Time Value Ref Range Status   04/03/2025 08:46 AM 17 10 - 52 U/L Final     Comment:     Patients treated with Sulfasalazine may generate falsely decreased results for ALT.   03/06/2025 01:04 PM 12 10 - 52 U/L Final     Comment:     Patients treated with Sulfasalazine may generate falsely decreased results for ALT.   01/30/2025 10:22 AM 14 10 - 52 U/L Final     Comment:     Patients treated with Sulfasalazine may generate falsely decreased results for ALT.     Creatinine   Date/Time Value Ref Range Status   04/03/2025 08:46 AM 0.71 0.50 - 1.30 mg/dL Final   03/06/2025 01:04 PM 0.78 0.50 - 1.30 mg/dL Final   01/30/2025 10:22 AM 0.83 0.50 - 1.30 mg/dL Final     Urea Nitrogen   Date/Time Value Ref Range Status   04/03/2025 08:46 AM 10 6 - 23 mg/dL Final   03/06/2025 01:04 PM 10 6 - 23 mg/dL Final   01/30/2025 10:22 AM 12 6 - 23 mg/dL Final     Albumin   Date/Time Value Ref Range Status   04/03/2025 08:46 AM 4.1 3.4 - 5.0 g/dL Final   03/06/2025 01:04 PM 4.0 3.4 - 5.0 g/dL Final   01/30/2025 10:22 AM 4.3 3.4 - 5.0 g/dL Final     Cancer AG 19-9   Date/Time Value Ref Range Status   04/03/2025 08:46 AM 74.97 (H) <35.00 U/mL Final   03/06/2025 01:04 .19 (H) <35.00 U/mL Final     No results found for: \"CEA\"     Assessment & Plan:  Mr. Foley is a 54 y.o.  with metastatic pancreatic adenocarcinoma to the liver diagnosed in September 2022. He presents for follow up.       Briefly, the patient was diagnosed in September 2022 with Denovo metastatic " disease to the liver.    He received 2 cycles of frontline chemotherapy with FOLFIRINOX, which were unfortunately complicated by colitis necessitating hospitalization.  Of note, there seems to  been a response to FOLFIRINOX as his baseline CA 19-9 of 661 down trended to 101.  His chemotherapy regimen was changed to gemcitabine plus Abraxane, which he is overall tolerating well on a day 1 day 15 schedule.  I have personally reviewed the images  of his recent PET CT and MRI liver, which show excellent response to lesions in his liver and minimal residual activity in the pancreas.     I discussed with the patient and his wife that hepatic artery infusion pump therapy is not a standard treatment for metastatic pancreatic adenocarcinoma, because in general this disease is very aggressive and time off of chemotherapy for surgical placement  of pump and resection of pancreas tumor will likely result in progression of disease.  Additionally, it is not well-established if intrahepatic chemotherapy is effective for this disease.  There is an ongoing clinical trial in [place] specifically for  hepatic artery infusion pump therapy for patients with metastatic pancreatic adenocarcinoma to the liver, which could be considered if he is interested.     He recently met with Dr. Urbina in [place] about the clinical trial, and he would like to proceed with this. I have disucssed the option of having systemic chemotherapy & heparin saline done here locally, and I am more than happy to manage this, but  we need to work with the clinical trial team at [place] to be sure this does not violate the protocol. I will keep working with the team for this.      7/24/23: Underwent distal pancreatectomy on 7/10/23 [facility] Dr. Azael Urbina in [place], and had placement of pump for ABEL chemotherapy at the same time. Based on the patient's description, a Medtronic pump was placed, which unfortunately requires a different  set up for access and  programming than the Intera pump. I was not aware prior to his surgery that a Medtronic would be placed, and I will have to sort out whether or not there is a department here that has a Medtronic set up for other indications that  I could coordinate this with.      9/25/23: Recently admitted for prolonged period with intra-abd hematoma. Most recently had this drained at [place]. He is scheduled for FUDR via Medtronic ABEL Pump at [place] on 10/9/23 and is hoping to get his pump emptied/refilled with saline on 10/23  here. I am not sure that we will have the capability to manage his Medtronic pump, as we are not set up for this. He would like his local oncologist Dr. Conway at [facility] to manage his gem/Abraxane.     10/16/23: continues to have drain in abdominal abscess. Scheduled for FUDR via ABEL on 10/23 in [place] and gem/Abraxane on 10/30 at [facility] with Dr. Conway.  RTC approx 1 mo virtual visit     6/4/24: Doing well on single-agent gemcitabine, he will reach out to my office when he is ready to discuss possible clinical trials.    Switched to cape/iri in 7/2024 but had PD ~8/6/2024 Hospitalized with sepsis.  Until 8/17/24     NGS shows pathogenic EGFR mutation as only clear .  Will plan to treat with afatinib and look for other potential clinical trial opportunities.  We discussed [employer] program which he will look into.     10/31/24 - started Afatinib                      Significant decline in  level.     2/3/25: CT CAP with improvement in tumor burden within liver and lymph nodes, sclerosis of the bones. Continue afatinib. Will discuss with pharmacist for financial assistance.      4/3/25 - tolerating well.    + heartburn - OK to take carafate - prescription sent in.    He is transitioning to medicare this month - no adjustments with afatinib coverage per pharm D     5/15/2025:   - CT shows PD in liver, mediastinum and bone.    - plan switch to osimertinib.    - obtain NGS.    - refer for RT.       Pall Care at [facility] prescribing pain meds     Lacey De La Garza, JIGNESH  5/15/2025

## 2025-05-16 LAB
COMMENTS - MP RESULT TYPE: NORMAL
SCAN RESULT: NORMAL

## 2025-05-19 ENCOUNTER — TELEPHONE (OUTPATIENT)
Dept: RADIATION ONCOLOGY | Facility: HOSPITAL | Age: 55
End: 2025-05-19
Payer: MEDICARE

## 2025-05-19 ENCOUNTER — TELEPHONE (OUTPATIENT)
Dept: ADMISSION | Facility: HOSPITAL | Age: 55
End: 2025-05-19
Payer: MEDICARE

## 2025-05-19 DIAGNOSIS — K90.9 DIARRHEA DUE TO MALABSORPTION (HHS-HCC): ICD-10-CM

## 2025-05-19 DIAGNOSIS — Z51.81 ENCOUNTER FOR MONITORING CARDIOTOXIC DRUG THERAPY: ICD-10-CM

## 2025-05-19 DIAGNOSIS — J18.9 COMMUNITY ACQUIRED PNEUMONIA, UNSPECIFIED LATERALITY: ICD-10-CM

## 2025-05-19 DIAGNOSIS — C25.9 MALIGNANT NEOPLASM OF PANCREAS, UNSPECIFIED LOCATION OF MALIGNANCY (MULTI): Primary | ICD-10-CM

## 2025-05-19 DIAGNOSIS — Z79.899 ENCOUNTER FOR MONITORING CARDIOTOXIC DRUG THERAPY: ICD-10-CM

## 2025-05-19 DIAGNOSIS — B59 PNEUMONIA OF BOTH UPPER LOBES DUE TO PNEUMOCYSTIS JIROVECII (MULTI): ICD-10-CM

## 2025-05-19 DIAGNOSIS — R19.7 DIARRHEA DUE TO MALABSORPTION (HHS-HCC): ICD-10-CM

## 2025-05-19 RX ORDER — LEVOFLOXACIN 500 MG/1
750 TABLET, FILM COATED ORAL DAILY
Qty: 11 TABLET | Refills: 0 | Status: SHIPPED | OUTPATIENT
Start: 2025-05-19 | End: 2025-05-20 | Stop reason: ALTCHOICE

## 2025-05-19 RX ORDER — DIPHENOXYLATE HYDROCHLORIDE AND ATROPINE SULFATE 2.5; .025 MG/1; MG/1
1 TABLET ORAL 4 TIMES DAILY PRN
Qty: 120 TABLET | Refills: 3 | Status: SHIPPED | OUTPATIENT
Start: 2025-05-19 | End: 2025-05-20

## 2025-05-19 NOTE — TELEPHONE ENCOUNTER
Pt called to follow up on 2 new prescriptions discussed at his follow up on 5/15.  He was expecting an antibiotic and something for diarrhea to be sent to [facility] in [place].

## 2025-05-20 ENCOUNTER — HOSPITAL ENCOUNTER (OUTPATIENT)
Dept: RADIATION ONCOLOGY | Facility: HOSPITAL | Age: 55
Setting detail: RADIATION/ONCOLOGY SERIES
Discharge: HOME | End: 2025-05-20
Payer: MEDICARE

## 2025-05-20 VITALS
BODY MASS INDEX: 27.43 KG/M2 | SYSTOLIC BLOOD PRESSURE: 119 MMHG | TEMPERATURE: 97.2 F | OXYGEN SATURATION: 94 % | WEIGHT: 180.4 LBS | HEART RATE: 85 BPM | DIASTOLIC BLOOD PRESSURE: 78 MMHG | RESPIRATION RATE: 18 BRPM

## 2025-05-20 DIAGNOSIS — C25.9 MALIGNANT NEOPLASM OF PANCREAS, UNSPECIFIED LOCATION OF MALIGNANCY (MULTI): Primary | ICD-10-CM

## 2025-05-20 DIAGNOSIS — R19.7 DIARRHEA DUE TO MALABSORPTION (HHS-HCC): ICD-10-CM

## 2025-05-20 DIAGNOSIS — K90.9 DIARRHEA DUE TO MALABSORPTION (HHS-HCC): ICD-10-CM

## 2025-05-20 DIAGNOSIS — C25.1 MALIGNANT NEOPLASM OF BODY OF PANCREAS (MULTI): ICD-10-CM

## 2025-05-20 DIAGNOSIS — J18.9 PNEUMONIA OF BOTH LUNGS DUE TO INFECTIOUS ORGANISM, UNSPECIFIED PART OF LUNG: Primary | ICD-10-CM

## 2025-05-20 PROCEDURE — 99205 OFFICE O/P NEW HI 60 MIN: CPT | Performed by: RADIOLOGY

## 2025-05-20 PROCEDURE — 99215 OFFICE O/P EST HI 40 MIN: CPT | Performed by: RADIOLOGY

## 2025-05-20 RX ORDER — DIPHENOXYLATE HYDROCHLORIDE AND ATROPINE SULFATE 2.5; .025 MG/1; MG/1
1 TABLET ORAL 4 TIMES DAILY PRN
Qty: 120 TABLET | Refills: 3 | Status: SHIPPED | OUTPATIENT
Start: 2025-05-20 | End: 2025-09-17

## 2025-05-20 RX ORDER — LEVOFLOXACIN 500 MG/1
750 TABLET, FILM COATED ORAL DAILY
Qty: 11 TABLET | Refills: 0 | Status: SHIPPED | OUTPATIENT
Start: 2025-05-20 | End: 2025-05-27

## 2025-05-20 ASSESSMENT — ENCOUNTER SYMPTOMS
NECK PAIN: 0
WHEEZING: 0
FLANK PAIN: 0
ARTHRALGIAS: 1
SHORTNESS OF BREATH: 0
BLOOD IN STOOL: 0
EXTREMITY WEAKNESS: 1
CHILLS: 1
LIGHT-HEADEDNESS: 1
LEG SWELLING: 0
SLEEP DISTURBANCE: 0
NERVOUS/ANXIOUS: 1
DEPRESSION: 1
APPETITE CHANGE: 1
TROUBLE SWALLOWING: 0
NAUSEA: 1
DIARRHEA: 1
NUMBNESS: 1
CONSTIPATION: 0
PALPITATIONS: 0
LOSS OF SENSATION IN FEET: 0
DECREASED CONCENTRATION: 1
SEIZURES: 0
ABDOMINAL DISTENTION: 1
BACK PAIN: 1
DIZZINESS: 1
UNEXPECTED WEIGHT CHANGE: 0
VOMITING: 0
HEMATURIA: 0
EYE PROBLEMS: 1
SORE THROAT: 0
NECK STIFFNESS: 0
SPEECH DIFFICULTY: 0
DYSURIA: 0
FEVER: 0
DIFFICULTY URINATING: 0
FATIGUE: 1
FREQUENCY: 0
RECTAL PAIN: 0
COUGH: 0
OCCASIONAL FEELINGS OF UNSTEADINESS: 0
WOUND: 0
DEPRESSION: 0
VOICE CHANGE: 0
ABDOMINAL PAIN: 1
DIAPHORESIS: 1
BRUISES/BLEEDS EASILY: 1
CONFUSION: 1
HEADACHES: 0

## 2025-05-20 ASSESSMENT — PAIN SCALES - GENERAL: PAINLEVEL_OUTOF10: 5

## 2025-05-20 NOTE — PROGRESS NOTES
Staff Physician: Becky Mc MD  Referring Physician: Duncan Nayak MD  Date of Service: 5/20/2025    RADIATION ONCOLOGY CONSULT NOTE    IDENTIFYING DATA:   Cancer Staging   Malignant neoplasm of body of pancreas (Multi)  Staging form: Exocrine Pancreas, AJCC 8th Edition  - Clinical: No stage assigned - Unsigned    Problem List Items Addressed This Visit       Malignant neoplasm of body of pancreas (Multi)    Relevant Orders    Referral to Radiation Oncology    Malignant neoplasm of pancreas, unspecified location of malignancy (Multi) - Primary    Relevant Orders    Rad Onc Intent to Treat     Mr. Foley is a 54-year-old man with metastatic pancreas adenocarcinoma, with multifocal progression and bilateral osseous iliac metastases with cortical breakthrough causing pain. He presents to consider palliative RT.    HISTORY OF PRESENT ILLNESS:  Mr. Deshawn Foley states that he was first diagnosed with pancreas cancer, EGFR mutant, in 2022, with a tail prior and liver only metastatic disease after presenting with epigastric pain. CA 19-9 at diagnosis was 661, and went to 101 after he underwent 2cy mFOLFIRINOX (10/5/22-10/19/22), complicated by colitis hospitalization, then gem/abraxane from 11/16/22- May 2023. He then had distal pancreatectomy and ABEL pump placement ([employer]) on 7/10/23 ([facility]ul[facility], [facility]), with subsequent further gem/abrax and FUDR. Ca 19-9 responded from 2400-->75 with response of liver lesions. This was complicated by pump bleeding 11/2023 and he ultimately had the pump removed and continued on single agent gem through June, 2024. He transitioed to [facility] in July, 2024, and was hospitalized with sepsis. NGS was sent, showing a EGFR  mutation. He therefore initiated afatinib with Dr. Nayak, which he continues on presently. He is also following with the team at [facility] (Judy Zabala NP) who is managing his pain medication, comprising fentanyl patch with oxycodone for  breakthrough. He has fatigue and sleeps a lot but also remains active including hiking, etc. More recently, he has had worsening bilateral hip pain. Restaging CT-CAP on 2025 showed multiple foci of progressive disease in the right liver, as well as worsening osseous metastatic disease. This included left and right iliac bone metastases, with cortical breakthrough on the right and thinning on the left. He notices the pain most when sitting on hard surfaces and when he shifts his weight from one hip to the other. He is here today with his wife to consider palliative radiation options.        PAST MEDICAL HISTORY:  Medical History[1]  PAST SURGICAL HISTORY:  Surgical History[2]  ALLERGIES:  Allergies[3]  MEDICATIONS:  Current Medications[4]   SOCIAL HISTORY:  Social History     Tobacco Use    Smoking status: Never     Passive exposure: Past    Smokeless tobacco: Never   Substance Use Topics    Alcohol use: Not Currently     FAMILY HISTORY:  Family History[5]    REVIEW OF SYSTEMS:  Except for the symptoms described above, the review of systems is negative. Specifically, except as noted in the HPI, when asked the patient expressed no complaints relative to constitutional (fever, weight loss), eyes, ears, nose, mouth, throat, neurologic, cardiovascular, pulmonary, breast, GI, , skin, musculoskeletal, endocrine, hematologic/lymphatic, or immunologic systems.    RADIATION SCREENING QUESTIONS:  Prior radiation therapy: No  Pacemaker: No  Other implantable devices: No  Connective tissue disease: No    PERFORMANCE STATUS:  Karnofsky Performance Score/ECO, Able to carry on normal activity; minor signs or symptoms of disease (ECOG equivalent 0)    PHYSICAL EXAMINATION:  /78   Pulse 85   Temp 36.2 °C (97.2 °F) (Temporal)   Resp 18   Wt 81.8 kg (180 lb 6.4 oz)   SpO2 94%   BMI 27.43 kg/m²   Pain score: 3/10  General: no acute distress, engaged in conversation. No jaundice.  HEENT: Normocephalic, atraumatic.  Extraocular movements are intact.   Neck: supple with trachea at midline, no palpable adenopathy.   Pulmonary: Breathing comfortably on room air, no respiratory distress  Cardiovascular: Regular rate, no cyanosis, well-perfused  Abdomen: Soft, nontender, nondistended.   Extremities: No lower extremity edema or cyanosis.   Musculoskeletal: Normal range of motion. Able to raise both arms above head without issues. Bilateral hip pain reproducible to palpation.  Skin: Without rash or obvious lesions.   Neurologic: Alert and oriented x3. Cranial nerves grossly intact.     LABORATORY AND IMAGING DATA:  Imaging: All imaging was personally reviewed and interpreted in clinic. Findings as per HPI and EMR.    Laboratory/Pathology:  All pertinent labs and pathology were personally reviewed and interpreted in clinic. Findings as per HPI and EMR.    IMPRESSION:  Mr. Foley has metastatic pancreas adenocarcinoma, with multifocal progression and bilateral osseous iliac metastases with cortical breakthrough causing pain. He is a good candidate for palliative RT.    PLAN:  For Deshawn, I would recommend a course of palliative intent, radioatherapy to palliate his pain. Specifically, I would recommend a course of 25Gy in 5 fractions over one week to his bilateral iliac lesions causing bone pain. We discussed that the goal of this is to treat his pain. We discussed that palliative RT results in pain relief in ~80% of patients with osseous metastatic involvement, but can take several weeks to achieve pain relief. The patient accepts these goals. Acute effects could include but are not limited to: fatigue, skin reaction, pain flare, and possible but unlikely loose stool. Long-term effects could include but are not limited to: skin hyperpigmentation, chest wall inflammation, unlikely but possible radiation pneumonitis, scar tissue formation, and spontaneous rib fracture, as well as potential future recurrence of pain. The patient accepts these  risks and signed informed consent.  He knows to call us anytime with any questions or concerns.   Simulation is scheduled for later this week.    Thank you for the opportunity to participate in the care of this kind man.      PAIN PLAN: The patient reports their pain is not well-controlled on their current regimen.  Their pain regimen is currently managed by Supportive Oncology.  Pain tolerable but plan for RT for additional bone pain control.      Becky Mc MD  5/20/2025  , Radiation Oncology         [1]   Past Medical History:  Diagnosis Date    Asthma     Diabetes mellitus (Multi)     GERD (gastroesophageal reflux disease)     Metastatic cancer (Multi)     Pancreatic cancer (Multi)    [2]   Past Surgical History:  Procedure Laterality Date    CHOLECYSTECTOMY      FL GUIDED TRANSVAGINAL TRANSRECTAL FLUID DRAIN  09/13/2023    FL GUIDED TRANSVAGINAL TRANSRECTAL FLUID DRAIN 9/13/2023    IR ANGIOGRAM INFERIOR EPIGASTRIC  08/28/2023    IR ANGIOGRAM INFERIOR EPIGASTRIC 8/28/2023 [facility] ANGIO    IR BODY DRAIN EXCHANGE  10/09/2023    IR BODY DRAIN EXCHANGE 10/9/2023    LYMPH NODE DISSECTION      NM HEPATIC ARTERY PUMP FLOW      ABEL pump  and then removed.    PANCREAS SURGERY      pancreas tail    SPLENECTOMY, TOTAL     [3]   Allergies  Allergen Reactions    Animal Dander Other    House Dust Mite Other    Mold Other    Pollen Extracts Other    Wool Other    Grass Pollen Other    House Dust Unknown   [4]   Current Outpatient Medications:     acetaminophen (Tylenol) 325 mg tablet, Take 2 tablets (650 mg) by mouth every 6 hours., Disp: , Rfl:     Advair Diskus 250-50 mcg/dose diskus inhaler, Inhale 1 puff 2 times a day. X 1 month, Disp: , Rfl:     afatinib (Gilotrif) 40 mg tablet, Take 1 tablet (40 mg total) by mouth once daily.  Take at least 1 hour before a meal or 2 hours after a meal., Disp: 30 tablet, Rfl: 5    albuterol 90 mcg/actuation inhaler, Inhale 1 puff 3 times a day as needed., Disp: , Rfl:      amLODIPine (Norvasc) 10 mg tablet, Take 1 tablet (10 mg) by mouth early in the morning.., Disp: , Rfl:     fenofibrate (Triglide) 160 mg tablet, Take 1 tablet (160 mg) by mouth once daily., Disp: , Rfl:     fentaNYL (Duragesic) 75 mcg/hr patch, Place on the skin., Disp: , Rfl:     fexofenadine (Allegra) 180 mg tablet, Take 1 tablet (180 mg) by mouth once daily., Disp: , Rfl:     fluticasone (Flonase) 50 mcg/actuation nasal spray, Administer into affected nostril(s)., Disp: , Rfl:     loperamide (Imodium A-D) 2 mg capsule, Take 1 capsule (2 mg) by mouth every 4 hours if needed. As needed diarrhea after each loose stool not to exceed 8 capsules, or 16 mg, in 24 hours, 30 caps,, Disp: , Rfl:     ondansetron ODT (Zofran-ODT) 8 mg disintegrating tablet, Dissolve 1 tablet (8 mg) in the mouth every 8 hours if needed., Disp: , Rfl:     oxyCODONE (Roxicodone) 5 mg immediate release tablet, Take 4 tablets (20 mg) by mouth every 4 hours if needed for severe pain (7 - 10)., Disp: , Rfl:     pregabalin (Lyrica) 75 mg capsule, Take 1 capsule (75 mg) by mouth every 6 hours., Disp: , Rfl:     prochlorperazine (Compazine) 10 mg tablet, Take by mouth every 6 hours if needed for nausea., Disp: , Rfl:     rosuvastatin (Crestor) 40 mg tablet, Take 1 tablet (40 mg) by mouth once daily., Disp: , Rfl:     diphenoxylate-atropine (Lomotil) 2.5-0.025 mg tablet, Take 1 tablet by mouth 4 times a day as needed for diarrhea., Disp: 120 tablet, Rfl: 3    fluticasone propion-salmeteroL (Advair Diskus) 250-50 mcg/dose diskus inhaler, Inhale 2 puffs once daily. (Patient not taking: Reported on 5/20/2025), Disp: , Rfl:     levoFLOXacin (Levaquin) 500 mg tablet, Take 1.5 tablets (750 mg) by mouth once daily for 7 days., Disp: 11 tablet, Rfl: 0    osimertinib (Tagrisso) 80 mg tablet, Take 1 tablet (80 mg total) by mouth once daily.  Swallow whole. (Patient not taking: Reported on 5/20/2025), Disp: 30 tablet, Rfl: 3  No current facility-administered  medications for this encounter.    Facility-Administered Medications Ordered in Other Encounters:     heparin flush 100 unit/mL syringe 500 Units, 500 Units, intra-catheter, PRN, Duncan Nayak MD    heparin flush 100 unit/mL syringe 500 Units, 500 Units, intra-catheter, PRN, Duncan Nayak MD, 500 Units at 05/15/25 1218  [5]   Family History  Problem Relation Name Age of Onset    Transient ischemic attack Mother      Heart disease Father      Brain cancer Mother's Brother      Breast cancer Cousin

## 2025-05-20 NOTE — PROGRESS NOTES
Radiation Oncology Nursing Note    Prior Radiotherapy:  No  No radiation treatments to show. (Treatments may have been administered in another system.)     Current Systemic Treatment:  Yes, describe: see emr     Presence of Pacemaker or ICD:  No    History of Autoimmune or Connective Tissue Disorders:  No    Pain: The patient's current pain level was assessed.  They report currently having a pain of 5 out of 10.  They feel their pain is not under control with the use of pain medications.  Oxy 10 mg and pregabalin about 1 hour ago ; pain on both sides of lower back.   Pain was same before taking meds.     Review of Systems:  Review of Systems   Constitutional:  Positive for appetite change (poor appetite), chills, diaphoresis and fatigue (low energy). Negative for fever and unexpected weight change.   HENT:   Positive for hearing loss. Negative for mouth sores, nosebleeds, sore throat, tinnitus, trouble swallowing and voice change.    Eyes:  Positive for eye problems (wears glasses).   Respiratory:  Negative for cough, shortness of breath (with activity) and wheezing.    Cardiovascular:  Negative for chest pain, leg swelling (occasional ankle swelling) and palpitations.   Gastrointestinal:  Positive for abdominal distention, abdominal pain, diarrhea and nausea. Negative for blood in stool, constipation, rectal pain and vomiting.   Genitourinary:  Positive for pelvic pain (top of pelvis in back both sides). Negative for bladder incontinence, difficulty urinating, dyspareunia, dysuria, frequency, hematuria, nocturia and penile discharge.    Musculoskeletal:  Positive for arthralgias and back pain. Negative for flank pain, neck pain and neck stiffness.   Skin:  Positive for rash (spots near nose and ear from nib drug). Negative for itching and wound.   Neurological:  Positive for dizziness, extremity weakness, light-headedness and numbness (fingers and feet). Negative for headaches, seizures and speech difficulty.    Hematological:  Bruises/bleeds easily.   Psychiatric/Behavioral:  Positive for confusion (brain fog), decreased concentration and depression. Negative for sleep disturbance and suicidal ideas. The patient is nervous/anxious.     Learner: patient and significant other  Educated on: external beam radiation   Readiness: acceptance  Preferred learning method: preferred: reading and listening  Method used: explanation and handout  Response: demonstrated understanding  Barriers: None  Preferred language: English  Resources given:     Step by Step Radiation  Skin  Fatigue  Side effect of radiation to lower spine and pelvis  Social Work - how can I help?

## 2025-05-21 ENCOUNTER — HOSPITAL ENCOUNTER (OUTPATIENT)
Dept: RADIOLOGY | Facility: EXTERNAL LOCATION | Age: 55
Discharge: HOME | End: 2025-05-21

## 2025-05-21 ENCOUNTER — SPECIALTY PHARMACY (OUTPATIENT)
Dept: PHARMACY | Facility: CLINIC | Age: 55
End: 2025-05-21

## 2025-05-21 ENCOUNTER — HOSPITAL ENCOUNTER (OUTPATIENT)
Dept: RADIATION ONCOLOGY | Facility: HOSPITAL | Age: 55
Setting detail: RADIATION/ONCOLOGY SERIES
Discharge: HOME | End: 2025-05-21
Payer: MEDICARE

## 2025-05-21 DIAGNOSIS — C25.2 MALIGNANT NEOPLASM OF TAIL OF PANCREAS (MULTI): ICD-10-CM

## 2025-05-21 DIAGNOSIS — C25.2 MALIGNANT NEOPLASM OF TAIL OF PANCREAS (MULTI): Primary | ICD-10-CM

## 2025-05-21 PROCEDURE — RXMED WILLOW AMBULATORY MEDICATION CHARGE

## 2025-05-21 PROCEDURE — 77290 THER RAD SIMULAJ FIELD CPLX: CPT

## 2025-05-22 ENCOUNTER — PHARMACY VISIT (OUTPATIENT)
Dept: PHARMACY | Facility: CLINIC | Age: 55
End: 2025-05-22
Payer: COMMERCIAL

## 2025-05-23 ENCOUNTER — INFUSION (OUTPATIENT)
Dept: HEMATOLOGY/ONCOLOGY | Facility: CLINIC | Age: 55
End: 2025-05-23
Payer: MEDICARE

## 2025-05-23 ENCOUNTER — HOSPITAL ENCOUNTER (OUTPATIENT)
Dept: RADIATION ONCOLOGY | Facility: HOSPITAL | Age: 55
Setting detail: RADIATION/ONCOLOGY SERIES
Discharge: HOME | End: 2025-05-23
Payer: MEDICARE

## 2025-05-23 VITALS
TEMPERATURE: 98.6 F | OXYGEN SATURATION: 94 % | HEIGHT: 67 IN | RESPIRATION RATE: 16 BRPM | BODY MASS INDEX: 28.44 KG/M2 | WEIGHT: 181.22 LBS | HEART RATE: 91 BPM | SYSTOLIC BLOOD PRESSURE: 126 MMHG | DIASTOLIC BLOOD PRESSURE: 83 MMHG

## 2025-05-23 DIAGNOSIS — C78.7 MALIGNANT NEOPLASM METASTATIC TO LIVER (MULTI): ICD-10-CM

## 2025-05-23 PROCEDURE — 77300 RADIATION THERAPY DOSE PLAN: CPT | Performed by: RADIOLOGY

## 2025-05-23 PROCEDURE — 96365 THER/PROPH/DIAG IV INF INIT: CPT | Mod: INF

## 2025-05-23 PROCEDURE — 2500000004 HC RX 250 GENERAL PHARMACY W/ HCPCS (ALT 636 FOR OP/ED): Mod: JZ | Performed by: INTERNAL MEDICINE

## 2025-05-23 PROCEDURE — 77334 RADIATION TREATMENT AID(S): CPT | Performed by: RADIOLOGY

## 2025-05-23 PROCEDURE — 77295 3-D RADIOTHERAPY PLAN: CPT | Performed by: RADIOLOGY

## 2025-05-23 RX ORDER — EPINEPHRINE 0.3 MG/.3ML
0.3 INJECTION SUBCUTANEOUS EVERY 5 MIN PRN
Status: DISCONTINUED | OUTPATIENT
Start: 2025-05-23 | End: 2025-05-23 | Stop reason: HOSPADM

## 2025-05-23 RX ORDER — HEPARIN SODIUM,PORCINE/PF 10 UNIT/ML
50 SYRINGE (ML) INTRAVENOUS AS NEEDED
Status: DISCONTINUED | OUTPATIENT
Start: 2025-05-23 | End: 2025-05-23 | Stop reason: HOSPADM

## 2025-05-23 RX ORDER — ZOLEDRONIC ACID 0.04 MG/ML
4 INJECTION, SOLUTION INTRAVENOUS ONCE
Status: COMPLETED | OUTPATIENT
Start: 2025-05-23 | End: 2025-05-23

## 2025-05-23 RX ORDER — HEPARIN 100 UNIT/ML
500 SYRINGE INTRAVENOUS AS NEEDED
Status: DISCONTINUED | OUTPATIENT
Start: 2025-05-23 | End: 2025-05-23 | Stop reason: HOSPADM

## 2025-05-23 RX ORDER — ALBUTEROL SULFATE 0.83 MG/ML
3 SOLUTION RESPIRATORY (INHALATION) AS NEEDED
OUTPATIENT
Start: 2025-06-20

## 2025-05-23 RX ORDER — FAMOTIDINE 10 MG/ML
20 INJECTION, SOLUTION INTRAVENOUS ONCE AS NEEDED
OUTPATIENT
Start: 2025-06-20

## 2025-05-23 RX ORDER — DIPHENHYDRAMINE HYDROCHLORIDE 50 MG/ML
50 INJECTION, SOLUTION INTRAMUSCULAR; INTRAVENOUS AS NEEDED
OUTPATIENT
Start: 2025-06-20

## 2025-05-23 RX ORDER — ZOLEDRONIC ACID 0.04 MG/ML
4 INJECTION, SOLUTION INTRAVENOUS ONCE
OUTPATIENT
Start: 2025-06-20

## 2025-05-23 RX ORDER — HEPARIN 100 UNIT/ML
500 SYRINGE INTRAVENOUS AS NEEDED
OUTPATIENT
Start: 2025-05-23

## 2025-05-23 RX ORDER — DIPHENHYDRAMINE HYDROCHLORIDE 50 MG/ML
50 INJECTION, SOLUTION INTRAMUSCULAR; INTRAVENOUS AS NEEDED
Status: DISCONTINUED | OUTPATIENT
Start: 2025-05-23 | End: 2025-05-23 | Stop reason: HOSPADM

## 2025-05-23 RX ORDER — ALBUTEROL SULFATE 0.83 MG/ML
3 SOLUTION RESPIRATORY (INHALATION) AS NEEDED
Status: DISCONTINUED | OUTPATIENT
Start: 2025-05-23 | End: 2025-05-23 | Stop reason: HOSPADM

## 2025-05-23 RX ORDER — HEPARIN SODIUM,PORCINE/PF 10 UNIT/ML
50 SYRINGE (ML) INTRAVENOUS AS NEEDED
OUTPATIENT
Start: 2025-05-23

## 2025-05-23 RX ORDER — FAMOTIDINE 10 MG/ML
20 INJECTION, SOLUTION INTRAVENOUS ONCE AS NEEDED
Status: DISCONTINUED | OUTPATIENT
Start: 2025-05-23 | End: 2025-05-23 | Stop reason: HOSPADM

## 2025-05-23 RX ORDER — EPINEPHRINE 0.3 MG/.3ML
0.3 INJECTION SUBCUTANEOUS EVERY 5 MIN PRN
OUTPATIENT
Start: 2025-06-20

## 2025-05-23 RX ADMIN — ZOLEDRONIC ACID 4 MG: 0.04 INJECTION, SOLUTION INTRAVENOUS at 14:45

## 2025-05-23 RX ADMIN — HEPARIN 500 UNITS: 100 SYRINGE at 15:40

## 2025-05-23 ASSESSMENT — PAIN SCALES - GENERAL: PAINLEVEL_OUTOF10: 0-NO PAIN

## 2025-05-23 NOTE — PROGRESS NOTES
First dose zometa today Reinforced need to ALWAYS obtain labs prior to each zometa infusion. Reinforced the importance of reporting any jaw pain as there is a risk for Osteonecrosis of the jaw .  Reported jaw pain would require Calcium supplements as zometa depletes the calcium in the bloodstream and strengthens the bone.  Reinforced to always keep their dentist aware they are on zometa infusions     Patient has pain today 5/10 around hips-    Lives in [place] - discussed with Social Work- to reach out next week     [facility] Infusion Note      Deshawn Foley is a 54 y.o. year old male here today,05/23/25,  in the [facility] infusion center for  following treatment regimen:  Medications given today :      BP Readings from Last 2 Encounters:   05/23/25 126/83   05/20/25 119/78       Hold treatment and notify provider if:,  Corrected Serum Calcium LESS THAN 8.6 mg/dL OR Ionized Calcium LESS THAN 1.1 mmol/L,  Serum Creatinine increase GREATER THAN OR EQUAL TO 0.5 mg/dL in patients with normal baseline (Serum Creatinine LESS THAN 1.4 mg/dL),  Serum Creatinine increase GREATER THAN OR EQUAL TO 1 mg/dL in patients with abnormal baseline (Serum Creatinine GREATER THAN OR EQUAL TO 1.4 mg/dL),  Recent (within 4 weeks) or planned invasive dental procedure      Administrations This Visit       zoledronic acid (Zometa) 4 mg  mL       Admin Date  05/23/2025 Action  New Bag Dose  4 mg Route  intravenous Documented By  Aamir Guajardo RN                    Vitals:    05/23/25 1432   BP: 126/83   Pulse: 91   Resp: 16   Temp: 37 °C (98.6 °F)   SpO2: 94%    on intake  Vitals:    05/23/25 1432   Weight: 82.2 kg (181 lb 3.5 oz)       Body surface area is 1.98 meters squared.      Next zometa infusion in June-      Pt tolerated and was discharged to home in stable condition. Pt ambulated  off the unit with a slow and steady gait. Pt had no further questions or concerns at this time. Has follow up appointment appropriately  scheduled. Verbalized understanding of follow up. Made aware that appointments and times are always available online on my chart.

## 2025-05-27 ENCOUNTER — SPECIALTY PHARMACY (OUTPATIENT)
Dept: HEMATOLOGY/ONCOLOGY | Facility: HOSPITAL | Age: 55
End: 2025-05-27
Payer: MEDICARE

## 2025-05-27 ENCOUNTER — APPOINTMENT (OUTPATIENT)
Dept: RADIATION ONCOLOGY | Facility: HOSPITAL | Age: 55
End: 2025-05-27
Payer: MEDICARE

## 2025-05-27 ENCOUNTER — SOCIAL WORK (OUTPATIENT)
Dept: HEMATOLOGY/ONCOLOGY | Facility: CLINIC | Age: 55
End: 2025-05-27
Payer: MEDICARE

## 2025-05-27 NOTE — PROGRESS NOTES
This  contacted this pt for the first time, requested by an infusion nurse.     The pt reports that he has pancreas cancer, and is on social security disability. He reports he now stays at home and his wife continues to work.   The pt reports that he receives monthly disability/[place] income. 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   He reports his wife works for a [employer] as an . He reports her monthly premium for her [employer] plan is around $1600.     The SW informed the pt that social workers can help with finding funds available based on diagnosis, finding resources in the area, and empathy/support.     The SW sent an email to the Financial Counselor asking them to look up funding/grants that may be available through any agencies.     The pt reports he and his wife's combined income is around $80,000 a year.     The SW sent an email to the pt with local resources for him to look into.

## 2025-05-27 NOTE — PROGRESS NOTES
[facility] Clinical Note  Initial Patient Education     Introduction  Deshawn Foley is a 54 y.o. male who is on the specialty pharmacy service for management of: Oncology Core.    Deshawn Foley is initiating the following therapy: Tagrisso (Osimertinib): Take 1 tablet (80 mg total) by mouth once daily.  Swallow whole.     Medication receipt date: 5/23/25  Duration of therapy: Until drug toxicity or progression    The most recent encounter visit with the referring prescriber Dr. Duncan Nayak on 5/15/25 was reviewed.  Pharmacy will continue to collaborate in the care of this patient with the referring prescriber.    Clinical Background  An initial assessment was conducted prior to first fill of the medication to determine the appropriateness of therapy given the patient's diagnosis, medication list, comorbidities, allergies, medical history, patient's ability to self administer medication, and therapeutic goals based on possible outcomes of therapy. Refer to initial assessment task completed on 5/20/25.    Labs/Procedures for clinical appropriateness that were reviewed include:   Oncology - CBC-diff:   Lab Results   Component Value Date    WBC 8.8 05/15/2025    RBC 4.69 05/15/2025    HGB 14.7 05/15/2025    HCT 43.7 05/15/2025    MCV 93 05/15/2025    MCHC 33.6 05/15/2025     05/15/2025    RDW 15.2 (H) 05/15/2025    NEUTOPHILPCT 66.2 05/15/2025    IGPCT 0.3 05/15/2025    LYMPHOPCT 23.2 05/15/2025    MONOPCT 8.7 05/15/2025    EOSPCT 0.9 05/15/2025    BASOPCT 0.7 05/15/2025    NEUTROABS 5.83 05/15/2025    LYMPHSABS 2.04 05/15/2025    MONOSABS 0.77 05/15/2025    EOSABS 0.08 05/15/2025    BASOSABS 0.06 05/15/2025    and CMP:   Lab Results   Component Value Date    GLUCOSE 157 (H) 05/15/2025     05/15/2025    K 3.5 05/15/2025     05/15/2025    CO2 20 (L) 05/15/2025    ANIONGAP 18 05/15/2025    BUN 12 05/15/2025    CREATININE 0.89 05/15/2025    CALCIUM 8.6 05/15/2025     ALBUMIN 3.9 05/15/2025    ALKPHOS 292 (H) 05/15/2025    PROT 6.9 05/15/2025    AST 30 05/15/2025    BILITOT 0.7 05/15/2025    ALT 30 05/15/2025       Education/Discussion  Deshawn was contacted on 5/27/2025 at 10:30 AM for a pharmacy visit with encounter number [id number] from:   [facility]  [address]  [address]  [facility] [address]  Dept: [phone]  Loc: [phone]  Deshawn consented to a/an Telephone visit, which was performed.    Medication Start Date (planned or actual): 5/23/25  Education was conducted prior to start of therapy? No - Initial education task scheduled for 5/27/25    Education discussed includes the following:  Patient Education  Counseled the Patient on the Following : Doses and administration, Adherence and missed doses, Possible side effects and management, Possible drug interactions, Lab monitoring and follow-up, Safe handling, storage, and disposal, Pharmacy contact information  Learner: Patient  Education Method: Handout, Explanation  Education Response: Verbalizes understanding  Additional details of the medication specific counseling are found within the linked patient education flowsheet.     The follow up timeline was discussed. Every person responds to and reacts to therapy differently. Patient should be assessed for efficacy and tolerability in approximately: 10-14 days and 8-12 weeks    Provided education on goals and possible outcomes of therapy:  Adherence with therapy  Timely completion of appropriate labs  Timely and appropriate follow up with provider  Identify and address medication interactions with presciption medications, OTC medications and supplements  Optimize or maintain quality of life  Oncology: Prolong life/No disease progression  Manage side effects (ex: nausea/vomiting, constipation, fatigue) in conjunction with care team    The importance of adherence was discussed and they were advised to take the medication as  prescribed by their provider.     Impression/Plan  Review and Assessment   Reviewed During This Encounter: Medications  Medications Assessed for Appropriate Use, Dose, Route, Frequency, and Duration: Yes  Medication Reconciliation Completed: Yes  Drug Interactions Evaluated: Yes  Clinically Relevant Drug Interactions Identified: Yes  List Interactions and Management Plan: Osimertinib - Rosuvastatin, Category C, may increase serum concentration of Osimertinib; Osimertinib - Levofloxacin, Category C, may increase risk of Qtc prolongation    This patient has not been identified as high risk due to Lack of high risk qualifiers.  The following action was taken: N/A.         The  Specialty Pharmacy Welcome packet may be viewed here:   Specialty Pharmacy Welcome Packet     Or by scanning QR code:      Provided contact information ([phone]) for [facility] and reviewed dispensing process, refill timeline and patient management follow up. Advised to contact the pharmacy if there are any adverse effects and/or changes to medication list, including prescriptions, OTC medications, herbal products, or supplements. Confirmed understanding of education conducted during assessment. All questions and concerns were addressed and patient was encouraged to reach out for additional questions or concerns.      Susie Dominguez, MelissaD

## 2025-05-29 ENCOUNTER — HOSPITAL ENCOUNTER (OUTPATIENT)
Dept: RADIATION ONCOLOGY | Facility: HOSPITAL | Age: 55
Setting detail: RADIATION/ONCOLOGY SERIES
Discharge: HOME | End: 2025-05-29
Payer: MEDICARE

## 2025-05-29 DIAGNOSIS — C79.51 SECONDARY MALIGNANT NEOPLASM OF BONE (MULTI): ICD-10-CM

## 2025-05-29 DIAGNOSIS — Z51.0 ENCOUNTER FOR ANTINEOPLASTIC RADIATION THERAPY: ICD-10-CM

## 2025-05-29 LAB
RAD ONC MSQ ACTUAL FRACTIONS DELIVERED: 1
RAD ONC MSQ ACTUAL FRACTIONS DELIVERED: 1
RAD ONC MSQ ACTUAL SESSION DELIVERED DOSE: 700 CGRAY
RAD ONC MSQ ACTUAL SESSION DELIVERED DOSE: 700 CGRAY
RAD ONC MSQ ACTUAL TOTAL DOSE: 700 CGRAY
RAD ONC MSQ ACTUAL TOTAL DOSE: 700 CGRAY
RAD ONC MSQ ELAPSED DAYS: 0
RAD ONC MSQ ELAPSED DAYS: 0
RAD ONC MSQ LAST DATE: NORMAL
RAD ONC MSQ LAST DATE: NORMAL
RAD ONC MSQ PRESCRIBED FRACTIONAL DOSE: 700 CGRAY
RAD ONC MSQ PRESCRIBED FRACTIONAL DOSE: 700 CGRAY
RAD ONC MSQ PRESCRIBED NUMBER OF FRACTIONS: 5
RAD ONC MSQ PRESCRIBED NUMBER OF FRACTIONS: 5
RAD ONC MSQ PRESCRIBED TECHNIQUE: NORMAL
RAD ONC MSQ PRESCRIBED TECHNIQUE: NORMAL
RAD ONC MSQ PRESCRIBED TOTAL DOSE: 3500 CGRAY
RAD ONC MSQ PRESCRIBED TOTAL DOSE: 3500 CGRAY
RAD ONC MSQ PRESCRIPTION PATTERN COMMENT: NORMAL
RAD ONC MSQ PRESCRIPTION PATTERN COMMENT: NORMAL
RAD ONC MSQ START DATE: NORMAL
RAD ONC MSQ START DATE: NORMAL
RAD ONC MSQ TREATMENT COURSE NUMBER: 1
RAD ONC MSQ TREATMENT COURSE NUMBER: 1
RAD ONC MSQ TREATMENT SITE: NORMAL
RAD ONC MSQ TREATMENT SITE: NORMAL

## 2025-05-29 PROCEDURE — 77280 THER RAD SIMULAJ FIELD SMPL: CPT | Performed by: RADIOLOGY

## 2025-05-29 PROCEDURE — 77412 RADIATION TX DELIVERY LVL 3: CPT | Performed by: RADIOLOGY

## 2025-05-30 ENCOUNTER — HOSPITAL ENCOUNTER (OUTPATIENT)
Dept: RADIATION ONCOLOGY | Facility: HOSPITAL | Age: 55
Setting detail: RADIATION/ONCOLOGY SERIES
Discharge: HOME | End: 2025-05-30
Payer: MEDICARE

## 2025-05-30 DIAGNOSIS — Z51.0 ENCOUNTER FOR ANTINEOPLASTIC RADIATION THERAPY: ICD-10-CM

## 2025-05-30 DIAGNOSIS — C79.51 SECONDARY MALIGNANT NEOPLASM OF BONE (MULTI): ICD-10-CM

## 2025-05-30 LAB
RAD ONC MSQ ACTUAL FRACTIONS DELIVERED: 2
RAD ONC MSQ ACTUAL FRACTIONS DELIVERED: 2
RAD ONC MSQ ACTUAL SESSION DELIVERED DOSE: 700 CGRAY
RAD ONC MSQ ACTUAL SESSION DELIVERED DOSE: 700 CGRAY
RAD ONC MSQ ACTUAL TOTAL DOSE: 1400 CGRAY
RAD ONC MSQ ACTUAL TOTAL DOSE: 1400 CGRAY
RAD ONC MSQ ELAPSED DAYS: 1
RAD ONC MSQ ELAPSED DAYS: 1
RAD ONC MSQ LAST DATE: NORMAL
RAD ONC MSQ LAST DATE: NORMAL
RAD ONC MSQ PRESCRIBED FRACTIONAL DOSE: 700 CGRAY
RAD ONC MSQ PRESCRIBED FRACTIONAL DOSE: 700 CGRAY
RAD ONC MSQ PRESCRIBED NUMBER OF FRACTIONS: 5
RAD ONC MSQ PRESCRIBED NUMBER OF FRACTIONS: 5
RAD ONC MSQ PRESCRIBED TECHNIQUE: NORMAL
RAD ONC MSQ PRESCRIBED TECHNIQUE: NORMAL
RAD ONC MSQ PRESCRIBED TOTAL DOSE: 3500 CGRAY
RAD ONC MSQ PRESCRIBED TOTAL DOSE: 3500 CGRAY
RAD ONC MSQ PRESCRIPTION PATTERN COMMENT: NORMAL
RAD ONC MSQ PRESCRIPTION PATTERN COMMENT: NORMAL
RAD ONC MSQ START DATE: NORMAL
RAD ONC MSQ START DATE: NORMAL
RAD ONC MSQ TREATMENT COURSE NUMBER: 1
RAD ONC MSQ TREATMENT COURSE NUMBER: 1
RAD ONC MSQ TREATMENT SITE: NORMAL
RAD ONC MSQ TREATMENT SITE: NORMAL

## 2025-05-30 PROCEDURE — 77412 RADIATION TX DELIVERY LVL 3: CPT | Performed by: RADIOLOGY

## 2025-05-30 PROCEDURE — 77387 GUIDANCE FOR RADJ TX DLVR: CPT | Performed by: RADIOLOGY

## 2025-06-02 ENCOUNTER — HOSPITAL ENCOUNTER (OUTPATIENT)
Dept: RADIATION ONCOLOGY | Facility: HOSPITAL | Age: 55
Setting detail: RADIATION/ONCOLOGY SERIES
Discharge: HOME | End: 2025-06-02
Payer: MEDICARE

## 2025-06-02 VITALS
SYSTOLIC BLOOD PRESSURE: 110 MMHG | HEART RATE: 92 BPM | OXYGEN SATURATION: 96 % | RESPIRATION RATE: 18 BRPM | WEIGHT: 180.1 LBS | TEMPERATURE: 97.2 F | DIASTOLIC BLOOD PRESSURE: 75 MMHG | BODY MASS INDEX: 27.94 KG/M2

## 2025-06-02 DIAGNOSIS — Z51.0 ENCOUNTER FOR ANTINEOPLASTIC RADIATION THERAPY: ICD-10-CM

## 2025-06-02 DIAGNOSIS — C79.51 SECONDARY MALIGNANT NEOPLASM OF BONE (MULTI): ICD-10-CM

## 2025-06-02 LAB
RAD ONC MSQ ACTUAL FRACTIONS DELIVERED: 3
RAD ONC MSQ ACTUAL FRACTIONS DELIVERED: 3
RAD ONC MSQ ACTUAL SESSION DELIVERED DOSE: 700 CGRAY
RAD ONC MSQ ACTUAL SESSION DELIVERED DOSE: 700 CGRAY
RAD ONC MSQ ACTUAL TOTAL DOSE: 2100 CGRAY
RAD ONC MSQ ACTUAL TOTAL DOSE: 2100 CGRAY
RAD ONC MSQ ELAPSED DAYS: 4
RAD ONC MSQ ELAPSED DAYS: 4
RAD ONC MSQ LAST DATE: NORMAL
RAD ONC MSQ LAST DATE: NORMAL
RAD ONC MSQ PRESCRIBED FRACTIONAL DOSE: 700 CGRAY
RAD ONC MSQ PRESCRIBED FRACTIONAL DOSE: 700 CGRAY
RAD ONC MSQ PRESCRIBED NUMBER OF FRACTIONS: 5
RAD ONC MSQ PRESCRIBED NUMBER OF FRACTIONS: 5
RAD ONC MSQ PRESCRIBED TECHNIQUE: NORMAL
RAD ONC MSQ PRESCRIBED TECHNIQUE: NORMAL
RAD ONC MSQ PRESCRIBED TOTAL DOSE: 3500 CGRAY
RAD ONC MSQ PRESCRIBED TOTAL DOSE: 3500 CGRAY
RAD ONC MSQ PRESCRIPTION PATTERN COMMENT: NORMAL
RAD ONC MSQ PRESCRIPTION PATTERN COMMENT: NORMAL
RAD ONC MSQ START DATE: NORMAL
RAD ONC MSQ START DATE: NORMAL
RAD ONC MSQ TREATMENT COURSE NUMBER: 1
RAD ONC MSQ TREATMENT COURSE NUMBER: 1
RAD ONC MSQ TREATMENT SITE: NORMAL
RAD ONC MSQ TREATMENT SITE: NORMAL

## 2025-06-02 PROCEDURE — 77387 GUIDANCE FOR RADJ TX DLVR: CPT | Performed by: RADIOLOGY

## 2025-06-02 PROCEDURE — 77412 RADIATION TX DELIVERY LVL 3: CPT | Performed by: RADIOLOGY

## 2025-06-02 RX ORDER — ADHESIVE BANDAGE
15 BANDAGE TOPICAL DAILY PRN
COMMUNITY

## 2025-06-02 RX ORDER — POLYETHYLENE GLYCOL 3350 17 G/17G
17 POWDER, FOR SOLUTION ORAL DAILY
COMMUNITY

## 2025-06-02 ASSESSMENT — PAIN SCALES - GENERAL: PAINLEVEL_OUTOF10: 5

## 2025-06-02 NOTE — PROGRESS NOTES
RADIATION ONCOLOGY ON-TREATMENT VISIT NOTE  Patient Name:  Deshawn Foley  MRN:  [id number]  :  1970    Radiation Oncologist: Emilio Burciaga MD  Referring Provider: Duncan Nayak MD  Primary Care Provider: Theodore Spears DO  Care Team: Patient Care Team:  Theodore Spears DO as PCP - General (Family Medicine)  Duncan Nayak MD as Consulting Physician (Hematology and Oncology)    Date of Service: 2025     Mr. Foley is a 54-year-old man with metastatic pancreas adenocarcinoma, with multifocal progression and bilateral osseous iliac metastases with cortical breakthrough causing pain. Plan for palliative RT, 25Gy/5fx with SIB to GTV using DCA.     Malignant neoplasm of body of pancreas (Multi), Clinical: No stage assigned    Specialty Problems          Radiation Oncology Problems    Metastatic carcinoma to liver        Malignant neoplasm metastatic to liver (Multi)        Malignant neoplasm of body of pancreas (Multi)        Malignant neoplasm of lymphoid, hematopoietic, and related tissue (Multi)        Pancreas cancer (Multi)        SCC (squamous cell carcinoma)        Malignant neoplasm of pancreas, unspecified location of malignancy (Multi)           Treatment Summary:  Radiation Therapy    Treatment Period Technique Fraction Dose Fractions Total Dose   Course 1 2025-2025  (days elapsed: 4)         left iliac 2025-2025 3D 700 / 700 cGy 3 / 5 2,100 / 3,500 cGy         right iliac 2025-2025 3D 700 / 700 cGy 3 / 5 2,100 / 3,500 cGy       SUBJECTIVE: Overall doing well, continues to have 5/10 pain bilateral hips. Taking fentanyl patch, oxy 20mg Q5H and reports some constipation last week, taking miralax. Some nausea last week, took nausea.     OBJECTIVE:   Vital Signs:  /75   Pulse 92   Temp 36.2 °C (97.2 °F) (Temporal)   Resp 18   Wt 81.7 kg (180 lb 1.6 oz)   SpO2 96%   BMI 27.94 kg/m²    Pain Scale:   Pain score: 5/10      Toxicity Assessment           6/2/2025    10:44 6/2/2025    10:48   Toxicity Assessment   Adverse Events Reviewed (WDL) No (Exceptions to WDL) No (Exceptions to WDL)   Treatment Site Pelvis - male Pelvis - female   Anorexia Grade 0    Anxiety Grade 0    Dehydration Grade 0    Depression Grade 0    Dermatitis Radiation Grade 0    Diarrhea Grade 0    Fatigue Grade 1    Nausea Grade 1       morning typically    Pain Grade 2       5/10 in hips;  feel it but can power through to do things.    Tumor Pain Grade 2    Vomiting Grade 0    Abdominal Pain Grade 0    Constipation Grade 1    Urinary Incontinence Grade 0    Bladder Spasm Grade 0         ASSESSMENT/PLAN:  The patient is tolerating radiation therapy as anticipated.  Continue per current treatment plan.    Discussed again that it may take a few weeks to have a noticeable improvement in pain. Discussed that if he has worse constipation or additional days he does not have bowel movements, to reach out and we will adjust his bowel regimen.     Emilio Burciaga MD  PGY-2, Radiation Oncology Resident

## 2025-06-03 ENCOUNTER — HOSPITAL ENCOUNTER (OUTPATIENT)
Dept: RADIATION ONCOLOGY | Facility: HOSPITAL | Age: 55
Setting detail: RADIATION/ONCOLOGY SERIES
Discharge: HOME | End: 2025-06-03
Payer: MEDICARE

## 2025-06-03 DIAGNOSIS — Z51.0 ENCOUNTER FOR ANTINEOPLASTIC RADIATION THERAPY: ICD-10-CM

## 2025-06-03 DIAGNOSIS — C79.51 SECONDARY MALIGNANT NEOPLASM OF BONE (MULTI): ICD-10-CM

## 2025-06-03 LAB
RAD ONC MSQ ACTUAL FRACTIONS DELIVERED: 4
RAD ONC MSQ ACTUAL FRACTIONS DELIVERED: 4
RAD ONC MSQ ACTUAL SESSION DELIVERED DOSE: 700 CGRAY
RAD ONC MSQ ACTUAL SESSION DELIVERED DOSE: 700 CGRAY
RAD ONC MSQ ACTUAL TOTAL DOSE: 2800 CGRAY
RAD ONC MSQ ACTUAL TOTAL DOSE: 2800 CGRAY
RAD ONC MSQ ELAPSED DAYS: 5
RAD ONC MSQ ELAPSED DAYS: 5
RAD ONC MSQ LAST DATE: NORMAL
RAD ONC MSQ LAST DATE: NORMAL
RAD ONC MSQ PRESCRIBED FRACTIONAL DOSE: 700 CGRAY
RAD ONC MSQ PRESCRIBED FRACTIONAL DOSE: 700 CGRAY
RAD ONC MSQ PRESCRIBED NUMBER OF FRACTIONS: 5
RAD ONC MSQ PRESCRIBED NUMBER OF FRACTIONS: 5
RAD ONC MSQ PRESCRIBED TECHNIQUE: NORMAL
RAD ONC MSQ PRESCRIBED TECHNIQUE: NORMAL
RAD ONC MSQ PRESCRIBED TOTAL DOSE: 3500 CGRAY
RAD ONC MSQ PRESCRIBED TOTAL DOSE: 3500 CGRAY
RAD ONC MSQ PRESCRIPTION PATTERN COMMENT: NORMAL
RAD ONC MSQ PRESCRIPTION PATTERN COMMENT: NORMAL
RAD ONC MSQ START DATE: NORMAL
RAD ONC MSQ START DATE: NORMAL
RAD ONC MSQ TREATMENT COURSE NUMBER: 1
RAD ONC MSQ TREATMENT COURSE NUMBER: 1
RAD ONC MSQ TREATMENT SITE: NORMAL
RAD ONC MSQ TREATMENT SITE: NORMAL

## 2025-06-03 PROCEDURE — 77387 GUIDANCE FOR RADJ TX DLVR: CPT | Performed by: RADIOLOGY

## 2025-06-03 PROCEDURE — 77412 RADIATION TX DELIVERY LVL 3: CPT | Performed by: RADIOLOGY

## 2025-06-04 ENCOUNTER — DOCUMENTATION (OUTPATIENT)
Dept: RADIATION ONCOLOGY | Facility: HOSPITAL | Age: 55
End: 2025-06-04

## 2025-06-04 ENCOUNTER — HOSPITAL ENCOUNTER (OUTPATIENT)
Dept: RADIATION ONCOLOGY | Facility: HOSPITAL | Age: 55
Setting detail: RADIATION/ONCOLOGY SERIES
Discharge: HOME | End: 2025-06-04
Payer: MEDICARE

## 2025-06-04 DIAGNOSIS — C79.51 SECONDARY MALIGNANT NEOPLASM OF BONE (MULTI): ICD-10-CM

## 2025-06-04 DIAGNOSIS — Z51.0 ENCOUNTER FOR ANTINEOPLASTIC RADIATION THERAPY: ICD-10-CM

## 2025-06-04 LAB
RAD ONC MSQ ACTUAL FRACTIONS DELIVERED: 5
RAD ONC MSQ ACTUAL FRACTIONS DELIVERED: 5
RAD ONC MSQ ACTUAL SESSION DELIVERED DOSE: 700 CGRAY
RAD ONC MSQ ACTUAL SESSION DELIVERED DOSE: 700 CGRAY
RAD ONC MSQ ACTUAL TOTAL DOSE: 3500 CGRAY
RAD ONC MSQ ACTUAL TOTAL DOSE: 3500 CGRAY
RAD ONC MSQ ELAPSED DAYS: 6
RAD ONC MSQ ELAPSED DAYS: 6
RAD ONC MSQ LAST DATE: NORMAL
RAD ONC MSQ LAST DATE: NORMAL
RAD ONC MSQ PRESCRIBED FRACTIONAL DOSE: 700 CGRAY
RAD ONC MSQ PRESCRIBED FRACTIONAL DOSE: 700 CGRAY
RAD ONC MSQ PRESCRIBED NUMBER OF FRACTIONS: 5
RAD ONC MSQ PRESCRIBED NUMBER OF FRACTIONS: 5
RAD ONC MSQ PRESCRIBED TECHNIQUE: NORMAL
RAD ONC MSQ PRESCRIBED TECHNIQUE: NORMAL
RAD ONC MSQ PRESCRIBED TOTAL DOSE: 3500 CGRAY
RAD ONC MSQ PRESCRIBED TOTAL DOSE: 3500 CGRAY
RAD ONC MSQ PRESCRIPTION PATTERN COMMENT: NORMAL
RAD ONC MSQ PRESCRIPTION PATTERN COMMENT: NORMAL
RAD ONC MSQ START DATE: NORMAL
RAD ONC MSQ START DATE: NORMAL
RAD ONC MSQ TREATMENT COURSE NUMBER: 1
RAD ONC MSQ TREATMENT COURSE NUMBER: 1
RAD ONC MSQ TREATMENT SITE: NORMAL
RAD ONC MSQ TREATMENT SITE: NORMAL

## 2025-06-04 PROCEDURE — 77387 GUIDANCE FOR RADJ TX DLVR: CPT | Performed by: RADIOLOGY

## 2025-06-04 PROCEDURE — 77412 RADIATION TX DELIVERY LVL 3: CPT | Performed by: RADIOLOGY

## 2025-06-04 PROCEDURE — 77336 RADIATION PHYSICS CONSULT: CPT | Performed by: RADIOLOGY

## 2025-06-12 ENCOUNTER — OFFICE VISIT (OUTPATIENT)
Dept: HEMATOLOGY/ONCOLOGY | Facility: HOSPITAL | Age: 55
End: 2025-06-12
Payer: MEDICARE

## 2025-06-12 ENCOUNTER — LAB (OUTPATIENT)
Dept: HEMATOLOGY/ONCOLOGY | Facility: HOSPITAL | Age: 55
End: 2025-06-12
Payer: MEDICARE

## 2025-06-12 VITALS
WEIGHT: 180.34 LBS | TEMPERATURE: 98.1 F | RESPIRATION RATE: 16 BRPM | HEART RATE: 88 BPM | BODY MASS INDEX: 27.97 KG/M2 | DIASTOLIC BLOOD PRESSURE: 78 MMHG | OXYGEN SATURATION: 100 % | SYSTOLIC BLOOD PRESSURE: 119 MMHG

## 2025-06-12 DIAGNOSIS — C25.1 MALIGNANT NEOPLASM OF BODY OF PANCREAS (MULTI): ICD-10-CM

## 2025-06-12 DIAGNOSIS — C78.7 MALIGNANT NEOPLASM METASTATIC TO LIVER (MULTI): ICD-10-CM

## 2025-06-12 LAB
ALBUMIN SERPL BCP-MCNC: 3.9 G/DL (ref 3.4–5)
ALP SERPL-CCNC: 604 U/L (ref 33–120)
ALT SERPL W P-5'-P-CCNC: 80 U/L (ref 10–52)
ANION GAP SERPL CALC-SCNC: 13 MMOL/L (ref 10–20)
AST SERPL W P-5'-P-CCNC: 65 U/L (ref 9–39)
BASOPHILS # BLD AUTO: 0.05 X10*3/UL (ref 0–0.1)
BASOPHILS NFR BLD AUTO: 0.6 %
BILIRUB SERPL-MCNC: 0.9 MG/DL (ref 0–1.2)
BUN SERPL-MCNC: 13 MG/DL (ref 6–23)
CALCIUM SERPL-MCNC: 9.3 MG/DL (ref 8.6–10.3)
CANCER AG19-9 SERPL-ACNC: 354.81 U/ML
CHLORIDE SERPL-SCNC: 104 MMOL/L (ref 98–107)
CO2 SERPL-SCNC: 25 MMOL/L (ref 21–32)
CREAT SERPL-MCNC: 0.89 MG/DL (ref 0.5–1.3)
EGFRCR SERPLBLD CKD-EPI 2021: >90 ML/MIN/1.73M*2
EOSINOPHIL # BLD AUTO: 0.35 X10*3/UL (ref 0–0.7)
EOSINOPHIL NFR BLD AUTO: 4 %
ERYTHROCYTE [DISTWIDTH] IN BLOOD BY AUTOMATED COUNT: 14.7 % (ref 11.5–14.5)
GLUCOSE SERPL-MCNC: 129 MG/DL (ref 74–99)
HCT VFR BLD AUTO: 41 % (ref 41–52)
HGB BLD-MCNC: 13.5 G/DL (ref 13.5–17.5)
IMM GRANULOCYTES # BLD AUTO: 0.02 X10*3/UL (ref 0–0.7)
IMM GRANULOCYTES NFR BLD AUTO: 0.2 % (ref 0–0.9)
LYMPHOCYTES # BLD AUTO: 1.58 X10*3/UL (ref 1.2–4.8)
LYMPHOCYTES NFR BLD AUTO: 18 %
MCH RBC QN AUTO: 30.6 PG (ref 26–34)
MCHC RBC AUTO-ENTMCNC: 32.9 G/DL (ref 32–36)
MCV RBC AUTO: 93 FL (ref 80–100)
MONOCYTES # BLD AUTO: 0.86 X10*3/UL (ref 0.1–1)
MONOCYTES NFR BLD AUTO: 9.8 %
NEUTROPHILS # BLD AUTO: 5.91 X10*3/UL (ref 1.2–7.7)
NEUTROPHILS NFR BLD AUTO: 67.4 %
NRBC BLD-RTO: 0 /100 WBCS (ref 0–0)
PLATELET # BLD AUTO: 258 X10*3/UL (ref 150–450)
POTASSIUM SERPL-SCNC: 4.2 MMOL/L (ref 3.5–5.3)
PROT SERPL-MCNC: 7.3 G/DL (ref 6.4–8.2)
RBC # BLD AUTO: 4.41 X10*6/UL (ref 4.5–5.9)
SODIUM SERPL-SCNC: 138 MMOL/L (ref 136–145)
WBC # BLD AUTO: 8.8 X10*3/UL (ref 4.4–11.3)

## 2025-06-12 PROCEDURE — 36591 DRAW BLOOD OFF VENOUS DEVICE: CPT

## 2025-06-12 PROCEDURE — 85025 COMPLETE CBC W/AUTO DIFF WBC: CPT

## 2025-06-12 PROCEDURE — 80053 COMPREHEN METABOLIC PANEL: CPT

## 2025-06-12 PROCEDURE — 86301 IMMUNOASSAY TUMOR CA 19-9: CPT

## 2025-06-12 PROCEDURE — 2500000004 HC RX 250 GENERAL PHARMACY W/ HCPCS (ALT 636 FOR OP/ED): Performed by: INTERNAL MEDICINE

## 2025-06-12 PROCEDURE — 99215 OFFICE O/P EST HI 40 MIN: CPT | Performed by: INTERNAL MEDICINE

## 2025-06-12 RX ORDER — HEPARIN 100 UNIT/ML
500 SYRINGE INTRAVENOUS AS NEEDED
Status: DISCONTINUED | OUTPATIENT
Start: 2025-06-12 | End: 2025-06-12 | Stop reason: HOSPADM

## 2025-06-12 RX ORDER — HEPARIN SODIUM,PORCINE/PF 10 UNIT/ML
50 SYRINGE (ML) INTRAVENOUS AS NEEDED
OUTPATIENT
Start: 2025-06-12

## 2025-06-12 RX ORDER — HEPARIN 100 UNIT/ML
500 SYRINGE INTRAVENOUS AS NEEDED
OUTPATIENT
Start: 2025-06-12

## 2025-06-12 RX ADMIN — HEPARIN 500 UNITS: 100 SYRINGE at 15:21

## 2025-06-12 ASSESSMENT — PAIN SCALES - GENERAL: PAINLEVEL_OUTOF10: 0-NO PAIN

## 2025-06-12 NOTE — PROGRESS NOTES
Cancer History:  DIAGNOSIS: Pancreas cancer     STAGING: IV     CURRENT SITES OF DISEASE:  Pancreas, liver     MOLECULAR/GENOMICS:  EGFR mutation     CURRENT THERAPY:  afatinib;     PREVIOUS THERAPY:  10/5/22--10/19/22: s/p 2 cycles FOLFIRINOX. Discontinued due to intractable nausea vomiting diarrhea and colitis  11/16/22 --May 2023: Gemcitabine plus Abraxane day 1 day 15  7/10/23: S/p distal pancreatectomy + placement of Medtronic pump for ABEL   Complicated pump with bleeding 11/2023.     TUMOR MARKER:     9/2022: 661  11/2022: 101 (after 2 cycles FOLFIRINOX)    Ca19-9: 220     ONCOLOGIC ISSUES:     PROVIDERS:  Primary medical oncologist: Dr. Abram Tolentino & Dr. Conway at [facility]  Surgical oncologist: Dr. Norberto Lester     HISTORY:   8/2022: Developed mid epigastric pain and discomfort  9/2/22: CT abdomen pelvis without contrast showed mass in the pancreatic head measuring 2.2 cm with suspicious numerous lesions in the liver and a peripancreatic lymph node.    9/15/2022: MRI abdomen confirmed mass in pancreatic body, dilation of pancreatic duct distal to the mass, liver showed numerous lesions.  9/23/2022: Underwent EUS with biopsy, FNA showed adenocarcinoma of the liver and of the pancreas mass consistent with pancreatic primary.     PMH: HLD     FH: No family history of malignancy     SH: , lives with wife, works full-time.  No tobacco, EtOH, illicit     History of Present Illness:   Mr. Foley is seen in follow up.   Overall feels great - continues to gain wt.   + heartburn  -   tums helps somewhat     - was on omeprazole  previously , however cannot take on afatinib     Trying to stay active   - getting out  Little bit of acne rash on face    - no diarrhea   Still on fentanyl patch with oxycodone for breakthrough  - was able to get down to 1    + fatigue  / sleeps a lot    - but staying active, gets out & does hiking.     Judy Zabala NP at [facility] is part of pall care team following &  prescribing opioids       4/23 - 5/6  - vacation  - will need early refill of afatinib     Objective:  /78 (BP Location: Right arm, Patient Position: Sitting, BP Cuff Size: Adult)   Pulse 88   Temp 36.7 °C (98.1 °F) (Temporal)   Resp 16   Wt 81.8 kg (180 lb 5.4 oz)   SpO2 100%   BMI 27.97 kg/m²      Daily Weight  06/12/25 : 81.8 kg (180 lb 5.4 oz)  06/02/25 : 81.7 kg (180 lb 1.6 oz)  05/23/25 : 82.2 kg (181 lb 3.5 oz)  05/20/25 : 81.8 kg (180 lb 6.4 oz)  05/15/25 : 83.5 kg (184 lb 1.4 oz)  04/03/25 : 85 kg (187 lb 6.3 oz)  03/06/25 : 85.4 kg (188 lb 4.4 oz)        Physical exam  GENERAL: Well appearing, in no apparent distress  HEENT: No cervical or supraclavicular adenopathy.  CV: Regular rate and rhythm, Normal S1, S2, no murmurs/rubs/gallops  RESP: Normal work of breathing, CTAB without wheeze or crackles  ABD: Normoactive bowel sounds. Soft, nontender, nondistended.  EXT: Warm and well perfused, no edema  NEURO: A&O x 3, normal gait  SKIN: Small acne-form rash on face   PSYCH: Normal affect.      ECOG Performance Status: 1    WBC   Date/Time Value Ref Range Status   06/12/2025 03:20 PM 8.8 4.4 - 11.3 x10*3/uL Final   05/15/2025 10:37 AM 8.8 4.4 - 11.3 x10*3/uL Final   04/03/2025 08:46 AM 10.8 4.4 - 11.3 x10*3/uL Final     Hemoglobin   Date Value Ref Range Status   06/12/2025 13.5 13.5 - 17.5 g/dL Final   05/15/2025 14.7 13.5 - 17.5 g/dL Final   04/03/2025 14.0 13.5 - 17.5 g/dL Final     MCV   Date/Time Value Ref Range Status   06/12/2025 03:20 PM 93 80 - 100 fL Final   05/15/2025 10:37 AM 93 80 - 100 fL Final   04/03/2025 08:46 AM 92 80 - 100 fL Final     Platelets   Date/Time Value Ref Range Status   06/12/2025 03:20  150 - 450 x10*3/uL Final   05/15/2025 10:37  150 - 450 x10*3/uL Final   04/03/2025 08:46  150 - 450 x10*3/uL Final     Neutrophils Absolute   Date/Time Value Ref Range Status   06/12/2025 03:20 PM 5.91 1.20 - 7.70 x10*3/uL Final     Comment:     Percent differential  counts (%) should be interpreted in the context of the absolute cell counts (cells/uL).   05/15/2025 10:37 AM 5.83 1.20 - 7.70 x10*3/uL Final     Comment:     Percent differential counts (%) should be interpreted in the context of the absolute cell counts (cells/uL).   04/03/2025 08:46 AM 4.89 1.20 - 7.70 x10*3/uL Final     Comment:     Percent differential counts (%) should be interpreted in the context of the absolute cell counts (cells/uL).     Bilirubin, Total   Date/Time Value Ref Range Status   06/12/2025 03:20 PM 0.9 0.0 - 1.2 mg/dL Final   05/15/2025 10:37 AM 0.7 0.0 - 1.2 mg/dL Final   04/03/2025 08:46 AM 0.7 0.0 - 1.2 mg/dL Final     AST   Date/Time Value Ref Range Status   06/12/2025 03:20 PM 65 (H) 9 - 39 U/L Final   05/15/2025 10:37 AM 30 9 - 39 U/L Final   04/03/2025 08:46 AM 17 9 - 39 U/L Final     ALT   Date/Time Value Ref Range Status   06/12/2025 03:20 PM 80 (H) 10 - 52 U/L Final     Comment:     Patients treated with Sulfasalazine may generate falsely decreased results for ALT.   05/15/2025 10:37 AM 30 10 - 52 U/L Final     Comment:     Patients treated with Sulfasalazine may generate falsely decreased results for ALT.   04/03/2025 08:46 AM 17 10 - 52 U/L Final     Comment:     Patients treated with Sulfasalazine may generate falsely decreased results for ALT.     Creatinine   Date/Time Value Ref Range Status   06/12/2025 03:20 PM 0.89 0.50 - 1.30 mg/dL Final   05/15/2025 10:37 AM 0.89 0.50 - 1.30 mg/dL Final   04/03/2025 08:46 AM 0.71 0.50 - 1.30 mg/dL Final     Urea Nitrogen   Date/Time Value Ref Range Status   06/12/2025 03:20 PM 13 6 - 23 mg/dL Final   05/15/2025 10:37 AM 12 6 - 23 mg/dL Final   04/03/2025 08:46 AM 10 6 - 23 mg/dL Final     Albumin   Date/Time Value Ref Range Status   06/12/2025 03:20 PM 3.9 3.4 - 5.0 g/dL Final   05/15/2025 10:37 AM 3.9 3.4 - 5.0 g/dL Final   04/03/2025 08:46 AM 4.1 3.4 - 5.0 g/dL Final     Cancer AG 19-9   Date/Time Value Ref Range Status   05/15/2025 10:37  ".89 (H) <35.00 U/mL Final   04/03/2025 08:46 AM 74.97 (H) <35.00 U/mL Final   03/06/2025 01:04 .19 (H) <35.00 U/mL Final     No results found for: \"CEA\"     Assessment & Plan:  Mr. Foley is a 54 y.o.  with metastatic pancreatic adenocarcinoma to the liver diagnosed in September 2022. He presents for follow up.       Briefly, the patient was diagnosed in September 2022 with Denovo metastatic disease to the liver.    He received 2 cycles of frontline chemotherapy with FOLFIRINOX, which were unfortunately complicated by colitis necessitating hospitalization.  Of note, there seems to  been a response to FOLFIRINOX as his baseline CA 19-9 of 661 down trended to 101.  His chemotherapy regimen was changed to gemcitabine plus Abraxane, which he is overall tolerating well on a day 1 day 15 schedule.  I have personally reviewed the images  of his recent PET CT and MRI liver, which show excellent response to lesions in his liver and minimal residual activity in the pancreas.     I discussed with the patient and his wife that hepatic artery infusion pump therapy is not a standard treatment for metastatic pancreatic adenocarcinoma, because in general this disease is very aggressive and time off of chemotherapy for surgical placement  of pump and resection of pancreas tumor will likely result in progression of disease.  Additionally, it is not well-established if intrahepatic chemotherapy is effective for this disease.  There is an ongoing clinical trial in [place] specifically for  hepatic artery infusion pump therapy for patients with metastatic pancreatic adenocarcinoma to the liver, which could be considered if he is interested.     He recently met with Dr. Urbina in [place] about the clinical trial, and he would like to proceed with this. I have disucssed the option of having systemic chemotherapy & heparin saline done here locally, and I am more than happy to manage this, but  we need to work with the clinical " trial team at [place] to be sure this does not violate the protocol. I will keep working with the team for this.      7/24/23: Underwent distal pancreatectomy on 7/10/23 wtih Dr. Azael Urbina in [place], and had placement of pump for ABEL chemotherapy at the same time. Based on the patient's description, a Medtronic pump was placed, which unfortunately requires a different  set up for access and programming than the Intera pump. I was not aware prior to his surgery that a Medtronic would be placed, and I will have to sort out whether or not there is a department here that has a Medtronic set up for other indications that  I could coordinate this with.      9/25/23: Recently admitted for prolonged period with intra-abd hematoma. Most recently had this drained at [place]. He is scheduled for FUDR via Medtronic ABEL Pump at [place] on 10/9/23 and is hoping to get his pump emptied/refilled with saline on 10/23  here. I am not sure that we will have the capability to manage his Medtronic pump, as we are not set up for this. He would like his local oncologist Dr. Conway at [facility] to manage his gem/Abraxane.     10/16/23: continues to have drain in abdominal abscess. Scheduled for FUDR via ABEL on 10/23 in [place] and gem/Abraxane on 10/30 at [facility] with Dr. Conway.  RTC approx 1 mo virtual visit     6/4/24: Doing well on single-agent gemcitabine, he will reach out to my office when he is ready to discuss possible clinical trials.    Switched to cape/iri in 7/2024 but had PD ~8/6/2024 Hospitalized with sepsis.  Until 8/17/24     NGS shows pathogenic EGFR mutation as only clear .  Will plan to treat with afatinib and look for other potential clinical trial opportunities.  We discussed [employer] program which he will look into.     10/31/24 - started Afatinib                      Significant decline in  level.     2/3/25: CT CAP with improvement in tumor burden within liver and lymph nodes, sclerosis of the bones.  Continue afatinib. Will discuss with pharmacist for financial assistance.      4/3/25 - tolerating well.    + heartburn - OK to take carafate - prescription sent in.    He is transitioning to medicare this month - no adjustments with afatinib coverage per pharm D     5/15/2025:   - CT shows PD in liver, mediastinum and bone.    - plan switch to osimertinib.    - obtain NGS.    - refer for RT.      [facility] at [facility] prescribing pain meds     Duncan Nayak MD   6/12/2025

## 2025-06-17 PROCEDURE — RXMED WILLOW AMBULATORY MEDICATION CHARGE

## 2025-06-19 NOTE — PROGRESS NOTES
RADIATION COMPLETION OF THERAPY NOTE    Patient Name:  Deshawn Foley  MRN:  [id number]  :  1970    Radiation Oncologist: Becky Mc MD  Referring Provider: No ref. provider found  Primary Care Provider: Theodore Spears DO    Brief History: Deshawn Foley is a 54 y.o. male with Malignant neoplasm of body of pancreas (Multi), Clinical: No stage assigned.  The patient completed radiotherapy as outlined below.    Radiation Treatment Summary:    Radiation Oncology   Radiation Treatments       Active   No active radiation treatments to show.     Completed   left iliac (Started on 2025)   Most recent fraction: 700 cGy given on 2025   Total given: 3,500 cGy / 3,500 cGy  (5 of 5 fractions)   Elapsed Days: 6   Technique: 3D        right iliac (Started on 2025)   Most recent fraction: 700 cGy given on 2025   Total given: 3,500 cGy / 3,500 cGy  (5 of 5 fractions)   Elapsed Days: 6   Technique: 3D                       Concurrent Chemotherapy:  Osimertinib, 28 Day Cycles   Treatment goal [No plan goal]   Treatment line [No plan line of treatment]   Status Active   Start Date 2025   End Date 3/23/2026 (Planned)   Treatment Medications osimertinib (Tagrisso) 80 mg tablet, 80 mg, oral, Daily, 1 of 3 cycles         CTCAE Toxicity Overview:   Toxicity Assessment          2025    10:44 2025    10:48   Toxicity Assessment   Adverse Events Reviewed (WDL) No (Exceptions to WDL) No (Exceptions to WDL)   Treatment Site Pelvis - male Pelvis - female   Anorexia Grade 0    Anxiety Grade 0    Dehydration Grade 0    Depression Grade 0    Dermatitis Radiation Grade 0    Diarrhea Grade 0    Fatigue Grade 1    Nausea Grade 1       morning typically    Pain Grade 2       5/10 in hips;  feel it but can power through to do things.    Tumor Pain Grade 2    Vomiting Grade 0    Abdominal Pain Grade 0    Constipation Grade 1    Urinary Incontinence Grade 0    Bladder Spasm Grade 0      Patient Disposition:    As needed or as directed by his Medical Oncologist Duncan Nayak MD.     Future Appointments       Date / Time Provider Department Dept Phone    6/20/2025 11:30 AM INF 03 [facility] [phone]    6/25/2025 8:45 AM [facility] ADMIN ROOM PET CT 1 [facility] [phone]    6/25/2025 9:45 AM [facility] PET CT 1 [facility] [phone]    7/18/2025 3:00 PM INF 01 [facility] [phone]    11/17/2025 8:00 AM Cassia Antony MD [facility] Ophthalmology [phone]

## 2025-06-20 ENCOUNTER — INFUSION (OUTPATIENT)
Dept: HEMATOLOGY/ONCOLOGY | Facility: CLINIC | Age: 55
End: 2025-06-20
Payer: MEDICARE

## 2025-06-20 VITALS
HEIGHT: 67 IN | HEART RATE: 78 BPM | OXYGEN SATURATION: 95 % | WEIGHT: 178.9 LBS | SYSTOLIC BLOOD PRESSURE: 111 MMHG | RESPIRATION RATE: 14 BRPM | BODY MASS INDEX: 28.08 KG/M2 | DIASTOLIC BLOOD PRESSURE: 77 MMHG | TEMPERATURE: 98.1 F

## 2025-06-20 DIAGNOSIS — C78.7 MALIGNANT NEOPLASM METASTATIC TO LIVER (MULTI): ICD-10-CM

## 2025-06-20 DIAGNOSIS — C25.1 MALIGNANT NEOPLASM OF BODY OF PANCREAS (MULTI): ICD-10-CM

## 2025-06-20 LAB
BASOPHILS # BLD AUTO: 0.03 X10*3/UL (ref 0–0.1)
BASOPHILS NFR BLD AUTO: 0.4 %
EOSINOPHIL # BLD AUTO: 0.24 X10*3/UL (ref 0–0.7)
EOSINOPHIL NFR BLD AUTO: 2.8 %
ERYTHROCYTE [DISTWIDTH] IN BLOOD BY AUTOMATED COUNT: 14.6 % (ref 11.5–14.5)
HCT VFR BLD AUTO: 40.9 % (ref 41–52)
HGB BLD-MCNC: 13.5 G/DL (ref 13.5–17.5)
IMM GRANULOCYTES # BLD AUTO: 0.02 X10*3/UL (ref 0–0.7)
IMM GRANULOCYTES NFR BLD AUTO: 0.2 % (ref 0–0.9)
LYMPHOCYTES # BLD AUTO: 1.47 X10*3/UL (ref 1.2–4.8)
LYMPHOCYTES NFR BLD AUTO: 17.4 %
MCH RBC QN AUTO: 30.8 PG (ref 26–34)
MCHC RBC AUTO-ENTMCNC: 33 G/DL (ref 32–36)
MCV RBC AUTO: 93 FL (ref 80–100)
MONOCYTES # BLD AUTO: 0.91 X10*3/UL (ref 0.1–1)
MONOCYTES NFR BLD AUTO: 10.8 %
NEUTROPHILS # BLD AUTO: 5.79 X10*3/UL (ref 1.2–7.7)
NEUTROPHILS NFR BLD AUTO: 68.4 %
NRBC BLD-RTO: ABNORMAL /100{WBCS}
PLATELET # BLD AUTO: 246 X10*3/UL (ref 150–450)
RBC # BLD AUTO: 4.39 X10*6/UL (ref 4.5–5.9)
WBC # BLD AUTO: 8.5 X10*3/UL (ref 4.4–11.3)

## 2025-06-20 PROCEDURE — 83735 ASSAY OF MAGNESIUM: CPT

## 2025-06-20 PROCEDURE — 85025 COMPLETE CBC W/AUTO DIFF WBC: CPT

## 2025-06-20 PROCEDURE — 80053 COMPREHEN METABOLIC PANEL: CPT

## 2025-06-20 PROCEDURE — 96365 THER/PROPH/DIAG IV INF INIT: CPT | Mod: INF

## 2025-06-20 PROCEDURE — 84100 ASSAY OF PHOSPHORUS: CPT

## 2025-06-20 PROCEDURE — 2500000004 HC RX 250 GENERAL PHARMACY W/ HCPCS (ALT 636 FOR OP/ED): Performed by: INTERNAL MEDICINE

## 2025-06-20 RX ORDER — DIPHENHYDRAMINE HYDROCHLORIDE 50 MG/ML
50 INJECTION, SOLUTION INTRAMUSCULAR; INTRAVENOUS AS NEEDED
OUTPATIENT
Start: 2025-07-18

## 2025-06-20 RX ORDER — ZOLEDRONIC ACID 0.04 MG/ML
4 INJECTION, SOLUTION INTRAVENOUS ONCE
Status: COMPLETED | OUTPATIENT
Start: 2025-06-20 | End: 2025-06-20

## 2025-06-20 RX ORDER — FAMOTIDINE 10 MG/ML
20 INJECTION, SOLUTION INTRAVENOUS ONCE AS NEEDED
OUTPATIENT
Start: 2025-07-18

## 2025-06-20 RX ORDER — HEPARIN 100 UNIT/ML
500 SYRINGE INTRAVENOUS AS NEEDED
OUTPATIENT
Start: 2025-06-20

## 2025-06-20 RX ORDER — ZOLEDRONIC ACID 0.04 MG/ML
4 INJECTION, SOLUTION INTRAVENOUS ONCE
OUTPATIENT
Start: 2025-07-18

## 2025-06-20 RX ORDER — HEPARIN 100 UNIT/ML
500 SYRINGE INTRAVENOUS AS NEEDED
Status: DISCONTINUED | OUTPATIENT
Start: 2025-06-20 | End: 2025-06-20 | Stop reason: HOSPADM

## 2025-06-20 RX ORDER — EPINEPHRINE 0.3 MG/.3ML
0.3 INJECTION SUBCUTANEOUS EVERY 5 MIN PRN
OUTPATIENT
Start: 2025-07-18

## 2025-06-20 RX ORDER — ALBUTEROL SULFATE 0.83 MG/ML
3 SOLUTION RESPIRATORY (INHALATION) AS NEEDED
OUTPATIENT
Start: 2025-07-18

## 2025-06-20 RX ORDER — HEPARIN SODIUM,PORCINE/PF 10 UNIT/ML
50 SYRINGE (ML) INTRAVENOUS AS NEEDED
OUTPATIENT
Start: 2025-06-20

## 2025-06-20 RX ADMIN — ZOLEDRONIC ACID 4 MG: 0.04 INJECTION, SOLUTION INTRAVENOUS at 11:56

## 2025-06-20 RX ADMIN — HEPARIN 500 UNITS: 100 SYRINGE at 12:32

## 2025-06-20 ASSESSMENT — PAIN SCALES - GENERAL: PAINLEVEL_OUTOF10: 5

## 2025-06-20 NOTE — PROGRESS NOTES
Patient is here today for Zometa infusion - patient denies any recent or scheduled dental procedures.  b/h/ lung sounds not auscultated  Patient tolerated infusion well.  No complaints. Call back instructions reviewed.    Patient verbalizes understanding of plan of care.  Ambulated off unit without difficulty, steady gait. Accompanied by wife.

## 2025-06-21 LAB
ALBUMIN SERPL BCP-MCNC: 4.1 G/DL (ref 3.4–5)
ALP SERPL-CCNC: 659 U/L (ref 33–120)
ALT SERPL W P-5'-P-CCNC: 80 U/L (ref 10–52)
ANION GAP SERPL CALC-SCNC: 15 MMOL/L (ref 10–20)
AST SERPL W P-5'-P-CCNC: 77 U/L (ref 9–39)
BILIRUB SERPL-MCNC: 0.9 MG/DL (ref 0–1.2)
BUN SERPL-MCNC: 11 MG/DL (ref 6–23)
CALCIUM SERPL-MCNC: 9.1 MG/DL (ref 8.6–10.6)
CHLORIDE SERPL-SCNC: 100 MMOL/L (ref 98–107)
CO2 SERPL-SCNC: 25 MMOL/L (ref 21–32)
CREAT SERPL-MCNC: 0.79 MG/DL (ref 0.5–1.3)
EGFRCR SERPLBLD CKD-EPI 2021: >90 ML/MIN/1.73M*2
GLUCOSE SERPL-MCNC: 130 MG/DL (ref 74–99)
MAGNESIUM SERPL-MCNC: 2.58 MG/DL (ref 1.6–2.4)
PHOSPHATE SERPL-MCNC: 3.6 MG/DL (ref 2.5–4.9)
POTASSIUM SERPL-SCNC: 4.5 MMOL/L (ref 3.5–5.3)
PROT SERPL-MCNC: 6.7 G/DL (ref 6.4–8.2)
SODIUM SERPL-SCNC: 135 MMOL/L (ref 136–145)

## 2025-06-23 ENCOUNTER — PHARMACY VISIT (OUTPATIENT)
Dept: PHARMACY | Facility: CLINIC | Age: 55
End: 2025-06-23
Payer: COMMERCIAL

## 2025-06-25 ENCOUNTER — ANCILLARY PROCEDURE (OUTPATIENT)
Facility: HOSPITAL | Age: 55
End: 2025-06-25
Payer: MEDICARE

## 2025-06-25 ENCOUNTER — SPECIALTY PHARMACY (OUTPATIENT)
Dept: HEMATOLOGY/ONCOLOGY | Facility: HOSPITAL | Age: 55
End: 2025-06-25

## 2025-06-25 DIAGNOSIS — C25.1 MALIGNANT NEOPLASM OF BODY OF PANCREAS (MULTI): ICD-10-CM

## 2025-06-25 PROCEDURE — A9552 F18 FDG: HCPCS | Performed by: INTERNAL MEDICINE

## 2025-06-25 PROCEDURE — 78815 PET IMAGE W/CT SKULL-THIGH: CPT | Mod: PS

## 2025-06-25 PROCEDURE — 3430000001 HC RX 343 DIAGNOSTIC RADIOPHARMACEUTICALS: Performed by: INTERNAL MEDICINE

## 2025-06-25 RX ORDER — FLUDEOXYGLUCOSE F 18 200 MCI/ML
11.7 INJECTION, SOLUTION INTRAVENOUS
Status: COMPLETED | OUTPATIENT
Start: 2025-06-25 | End: 2025-06-25

## 2025-06-25 RX ADMIN — FLUDEOXYGLUCOSE F 18 11.7 MILLICURIE: 200 INJECTION, SOLUTION INTRAVENOUS at 08:47

## 2025-06-25 NOTE — PROGRESS NOTES
Clinical Pharmacist was unable to reach patient for osimertinib toxicity evaluation after 3 telephone attempts; calls sent to [facility].  Patient obtaining a PET CT scan today ordered by Dr. Nayak after elevated  noted.     Message sent to nursing and scheduling pool to coordinate follow-up for scan review with MD. Cecile Vicente, MelissaD

## 2025-06-26 ENCOUNTER — TELEPHONE (OUTPATIENT)
Dept: HEMATOLOGY/ONCOLOGY | Facility: CLINIC | Age: 55
End: 2025-06-26

## 2025-06-26 NOTE — TELEPHONE ENCOUNTER
Patient friend needs a call results and she would like to know alilttle more information on what's next

## 2025-06-27 DIAGNOSIS — C25.9 MALIGNANT NEOPLASM OF PANCREAS, UNSPECIFIED LOCATION OF MALIGNANCY (MULTI): Primary | ICD-10-CM

## 2025-06-27 RX ORDER — PROCHLORPERAZINE MALEATE 10 MG
10 TABLET ORAL EVERY 6 HOURS PRN
OUTPATIENT
Start: 2025-07-03

## 2025-06-27 RX ORDER — FAMOTIDINE 10 MG/ML
20 INJECTION, SOLUTION INTRAVENOUS ONCE AS NEEDED
OUTPATIENT
Start: 2025-07-03

## 2025-06-27 RX ORDER — DIPHENHYDRAMINE HYDROCHLORIDE 50 MG/ML
50 INJECTION, SOLUTION INTRAMUSCULAR; INTRAVENOUS AS NEEDED
OUTPATIENT
Start: 2025-07-03

## 2025-06-27 RX ORDER — ALBUTEROL SULFATE 0.83 MG/ML
3 SOLUTION RESPIRATORY (INHALATION) AS NEEDED
OUTPATIENT
Start: 2025-07-03

## 2025-06-27 RX ORDER — EPINEPHRINE 0.3 MG/.3ML
0.3 INJECTION SUBCUTANEOUS EVERY 5 MIN PRN
OUTPATIENT
Start: 2025-07-03

## 2025-06-27 RX ORDER — PROCHLORPERAZINE EDISYLATE 5 MG/ML
10 INJECTION INTRAMUSCULAR; INTRAVENOUS EVERY 6 HOURS PRN
OUTPATIENT
Start: 2025-07-03

## 2025-07-03 ENCOUNTER — OFFICE VISIT (OUTPATIENT)
Dept: HEMATOLOGY/ONCOLOGY | Facility: HOSPITAL | Age: 55
End: 2025-07-03
Payer: MEDICARE

## 2025-07-03 VITALS
WEIGHT: 175.7 LBS | DIASTOLIC BLOOD PRESSURE: 69 MMHG | OXYGEN SATURATION: 100 % | TEMPERATURE: 97.2 F | BODY MASS INDEX: 27.26 KG/M2 | HEART RATE: 76 BPM | SYSTOLIC BLOOD PRESSURE: 102 MMHG | RESPIRATION RATE: 16 BRPM

## 2025-07-03 DIAGNOSIS — C25.9 MALIGNANT NEOPLASM OF PANCREAS, UNSPECIFIED LOCATION OF MALIGNANCY (MULTI): Primary | ICD-10-CM

## 2025-07-03 DIAGNOSIS — C25.1 MALIGNANT NEOPLASM OF BODY OF PANCREAS (MULTI): ICD-10-CM

## 2025-07-03 PROCEDURE — 99215 OFFICE O/P EST HI 40 MIN: CPT | Performed by: INTERNAL MEDICINE

## 2025-07-03 PROCEDURE — 3078F DIAST BP <80 MM HG: CPT | Performed by: INTERNAL MEDICINE

## 2025-07-03 PROCEDURE — 3074F SYST BP LT 130 MM HG: CPT | Performed by: INTERNAL MEDICINE

## 2025-07-03 RX ORDER — PROCHLORPERAZINE MALEATE 10 MG
10 TABLET ORAL EVERY 6 HOURS PRN
OUTPATIENT
Start: 2025-07-24

## 2025-07-03 RX ORDER — FAMOTIDINE 10 MG/ML
20 INJECTION, SOLUTION INTRAVENOUS ONCE AS NEEDED
OUTPATIENT
Start: 2025-07-24

## 2025-07-03 RX ORDER — EPINEPHRINE 0.3 MG/.3ML
0.3 INJECTION SUBCUTANEOUS EVERY 5 MIN PRN
OUTPATIENT
Start: 2025-07-24

## 2025-07-03 RX ORDER — PROCHLORPERAZINE EDISYLATE 5 MG/ML
10 INJECTION INTRAMUSCULAR; INTRAVENOUS EVERY 6 HOURS PRN
OUTPATIENT
Start: 2025-07-24

## 2025-07-03 RX ORDER — ALBUTEROL SULFATE 0.83 MG/ML
3 SOLUTION RESPIRATORY (INHALATION) AS NEEDED
OUTPATIENT
Start: 2025-07-24

## 2025-07-03 RX ORDER — DIPHENHYDRAMINE HYDROCHLORIDE 50 MG/ML
50 INJECTION, SOLUTION INTRAMUSCULAR; INTRAVENOUS AS NEEDED
OUTPATIENT
Start: 2025-07-24

## 2025-07-03 ASSESSMENT — PAIN SCALES - GENERAL: PAINLEVEL_OUTOF10: 7

## 2025-07-03 NOTE — PROGRESS NOTES
Cancer History:  DIAGNOSIS: Pancreas cancer     STAGING: IV     CURRENT SITES OF DISEASE:  Pancreas, liver     MOLECULAR/GENOMICS:  EGFR mutation     CURRENT THERAPY:  afatinib;     PREVIOUS THERAPY:  10/5/22--10/19/22: s/p 2 cycles FOLFIRINOX. Discontinued due to intractable nausea vomiting diarrhea and colitis  11/16/22 --May 2023: Gemcitabine plus Abraxane day 1 day 15  7/10/23: S/p distal pancreatectomy + placement of Medtronic pump for ABEL   Complicated pump with bleeding 11/2023.     TUMOR MARKER:     Cancer AG 19-9 (U/mL)   Date Value   06/12/2025 354.81 (H)   05/15/2025 202.89 (H)   04/03/2025 74.97 (H)   03/06/2025 110.19 (H)      ONCOLOGIC ISSUES:  PROVIDERS:  Primary medical oncologist: Dr. Abram Tolentino & Dr. Conway at [facility]  Surgical oncologist: Dr. Norberto Lester     HISTORY:   8/2022: Developed mid epigastric pain and discomfort  9/2/22: CT abdomen pelvis without contrast showed mass in the pancreatic head measuring 2.2 cm with suspicious numerous lesions in the liver and a peripancreatic lymph node.    9/15/2022: MRI abdomen confirmed mass in pancreatic body, dilation of pancreatic duct distal to the mass, liver showed numerous lesions.  9/23/2022: Underwent EUS with biopsy, FNA showed adenocarcinoma of the liver and of the pancreas mass consistent with pancreatic primary.     PMH: HLD     FH: No family history of malignancy     SH: , lives with wife, works full-time.  No tobacco, EtOH, illicit     History of Present Illness:   Mr. Foley is seen in follow up.   Overall feels great - continues to gain wt.   + heartburn  -   tums helps somewhat     - was on omeprazole  previously , however cannot take on afatinib     Trying to stay active   - getting out  Little bit of acne rash on face    - no diarrhea   Still on fentanyl patch with oxycodone for breakthrough  - was able to get down to 1    + fatigue  / sleeps a lot    - but staying active, gets out & does hiking.     Judy  GLADIS Zabala at [facility] is part of pall care team following & prescribing opioids       4/23 - 5/6  - vacation  - will need early refill of afatinib     Objective:  There were no vitals taken for this visit.     Daily Weight  07/03/25 : 79.7 kg (175 lb 11.2 oz)  06/20/25 : 81.1 kg (178 lb 14.5 oz)  06/12/25 : 81.8 kg (180 lb 5.4 oz)  06/02/25 : 81.7 kg (180 lb 1.6 oz)  05/23/25 : 82.2 kg (181 lb 3.5 oz)  05/20/25 : 81.8 kg (180 lb 6.4 oz)  05/15/25 : 83.5 kg (184 lb 1.4 oz)        Physical exam  GENERAL: Well appearing, in no apparent distress  HEENT: No cervical or supraclavicular adenopathy.  CV: Regular rate and rhythm, Normal S1, S2, no murmurs/rubs/gallops  RESP: Normal work of breathing, CTAB without wheeze or crackles  ABD: Normoactive bowel sounds. Soft, nontender, nondistended.  EXT: Warm and well perfused, no edema  NEURO: A&O x 3, normal gait  SKIN: Small acne-form rash on face   PSYCH: Normal affect.      ECOG Performance Status: 1    WBC   Date/Time Value Ref Range Status   06/20/2025 11:35 AM 8.5 4.4 - 11.3 x10*3/uL Final   06/12/2025 03:20 PM 8.8 4.4 - 11.3 x10*3/uL Final   05/15/2025 10:37 AM 8.8 4.4 - 11.3 x10*3/uL Final     Hemoglobin   Date Value Ref Range Status   06/20/2025 13.5 13.5 - 17.5 g/dL Final   06/12/2025 13.5 13.5 - 17.5 g/dL Final   05/15/2025 14.7 13.5 - 17.5 g/dL Final     MCV   Date/Time Value Ref Range Status   06/20/2025 11:35 AM 93 80 - 100 fL Final   06/12/2025 03:20 PM 93 80 - 100 fL Final   05/15/2025 10:37 AM 93 80 - 100 fL Final     Platelets   Date/Time Value Ref Range Status   06/20/2025 11:35  150 - 450 x10*3/uL Final   06/12/2025 03:20  150 - 450 x10*3/uL Final   05/15/2025 10:37  150 - 450 x10*3/uL Final     Neutrophils Absolute   Date/Time Value Ref Range Status   06/20/2025 11:35 AM 5.79 1.20 - 7.70 x10*3/uL Final     Comment:     Percent differential counts (%) should be interpreted in the context of the absolute cell counts (cells/uL).   06/12/2025  03:20 PM 5.91 1.20 - 7.70 x10*3/uL Final     Comment:     Percent differential counts (%) should be interpreted in the context of the absolute cell counts (cells/uL).   05/15/2025 10:37 AM 5.83 1.20 - 7.70 x10*3/uL Final     Comment:     Percent differential counts (%) should be interpreted in the context of the absolute cell counts (cells/uL).     Bilirubin, Total   Date/Time Value Ref Range Status   06/20/2025 11:35 AM 0.9 0.0 - 1.2 mg/dL Final   06/12/2025 03:20 PM 0.9 0.0 - 1.2 mg/dL Final   05/15/2025 10:37 AM 0.7 0.0 - 1.2 mg/dL Final     AST   Date/Time Value Ref Range Status   06/20/2025 11:35 AM 77 (H) 9 - 39 U/L Final   06/12/2025 03:20 PM 65 (H) 9 - 39 U/L Final   05/15/2025 10:37 AM 30 9 - 39 U/L Final     ALT   Date/Time Value Ref Range Status   06/20/2025 11:35 AM 80 (H) 10 - 52 U/L Final     Comment:     Patients treated with Sulfasalazine may generate falsely decreased results for ALT.   06/12/2025 03:20 PM 80 (H) 10 - 52 U/L Final     Comment:     Patients treated with Sulfasalazine may generate falsely decreased results for ALT.   05/15/2025 10:37 AM 30 10 - 52 U/L Final     Comment:     Patients treated with Sulfasalazine may generate falsely decreased results for ALT.     Creatinine   Date/Time Value Ref Range Status   06/20/2025 11:35 AM 0.79 0.50 - 1.30 mg/dL Final   06/12/2025 03:20 PM 0.89 0.50 - 1.30 mg/dL Final   05/15/2025 10:37 AM 0.89 0.50 - 1.30 mg/dL Final     Urea Nitrogen   Date/Time Value Ref Range Status   06/20/2025 11:35 AM 11 6 - 23 mg/dL Final   06/12/2025 03:20 PM 13 6 - 23 mg/dL Final   05/15/2025 10:37 AM 12 6 - 23 mg/dL Final     Albumin   Date/Time Value Ref Range Status   06/20/2025 11:35 AM 4.1 3.4 - 5.0 g/dL Final   06/12/2025 03:20 PM 3.9 3.4 - 5.0 g/dL Final   05/15/2025 10:37 AM 3.9 3.4 - 5.0 g/dL Final     Cancer AG 19-9   Date/Time Value Ref Range Status   06/12/2025 03:20 .81 (H) <35.00 U/mL Final   05/15/2025 10:37 .89 (H) <35.00 U/mL Final  "  04/03/2025 08:46 AM 74.97 (H) <35.00 U/mL Final     No results found for: \"CEA\"     Assessment & Plan:  Mr. Foley is a 54 y.o.  with metastatic pancreatic adenocarcinoma to the liver diagnosed in September 2022. He presents for follow up.       Briefly, the patient was diagnosed in September 2022 with Denovo metastatic disease to the liver.    He received 2 cycles of frontline chemotherapy with FOLFIRINOX, which were unfortunately complicated by colitis necessitating hospitalization.  Of note, there seems to  been a response to FOLFIRINOX as his baseline CA 19-9 of 661 down trended to 101.  His chemotherapy regimen was changed to gemcitabine plus Abraxane, which he is overall tolerating well on a day 1 day 15 schedule.  I have personally reviewed the images  of his recent PET CT and MRI liver, which show excellent response to lesions in his liver and minimal residual activity in the pancreas.     I discussed with the patient and his wife that hepatic artery infusion pump therapy is not a standard treatment for metastatic pancreatic adenocarcinoma, because in general this disease is very aggressive and time off of chemotherapy for surgical placement  of pump and resection of pancreas tumor will likely result in progression of disease.  Additionally, it is not well-established if intrahepatic chemotherapy is effective for this disease.  There is an ongoing clinical trial in [place] specifically for  hepatic artery infusion pump therapy for patients with metastatic pancreatic adenocarcinoma to the liver, which could be considered if he is interested.     He recently met with Dr. Urbina in [place] about the clinical trial, and he would like to proceed with this. I have disucssed the option of having systemic chemotherapy & heparin saline done here locally, and I am more than happy to manage this, but  we need to work with the clinical trial team at [place] to be sure this does not violate the protocol. I will keep " working with the team for this.      7/24/23: Underwent distal pancreatectomy on 7/10/23 wt Dr. Azael Urbina in [place], and had placement of pump for BAEL chemotherapy at the same time. Based on the patient's description, a Medtronic pump was placed, which unfortunately requires a different  set up for access and programming than the Intera pump. I was not aware prior to his surgery that a Medtronic would be placed, and I will have to sort out whether or not there is a department here that has a Medtronic set up for other indications that  I could coordinate this with.      9/25/23: Recently admitted for prolonged period with intra-abd hematoma. Most recently had this drained at [place]. He is scheduled for FUDR via Medtronic ABEL Pump at [place] on 10/9/23 and is hoping to get his pump emptied/refilled with saline on 10/23  here. I am not sure that we will have the capability to manage his Medtronic pump, as we are not set up for this. He would like his local oncologist Dr. Conway at [facility] to manage his gem/Abraxane.     10/16/23: continues to have drain in abdominal abscess. Scheduled for FUDR via ABEL on 10/23 in [place] and gem/Abraxane on 10/30 at [facility] with Dr. Conway.  RTC approx 1 mo virtual visit     6/4/24: Doing well on single-agent gemcitabine, he will reach out to my office when he is ready to discuss possible clinical trials.    Switched to cape/iri in 7/2024 but had PD ~8/6/2024 Hospitalized with sepsis.  Until 8/17/24     NGS shows pathogenic EGFR mutation as only clear .  Will plan to treat with afatinib and look for other potential clinical trial opportunities.  We discussed [employer] program which he will look into.     10/31/24 - started Afatinib    Significant decline in  level.     2/3/25: CT CAP with improvement in tumor burden within liver and lymph nodes, sclerosis of the bones. Continue afatinib. Will discuss with pharmacist for financial assistance.      4/3/25 - tolerating  well.    + heartburn - OK to take carafate - prescription sent in.    He is transitioning to medicare this month - no adjustments with afatinib coverage per pharm D     5/15/2025:   - CT shows PD in liver, mediastinum and bone.    - plan switch to osimertinib.    - obtain NGS.    - refer for RT.      6/12/25:  -  going up on Osimertinib.      7/3/25 PET/CT shows PD.  - Start pembrolizumab given high TMB.      [facility] at [facility] prescribing pain meds     Duncan Nayak MD   7/2/2025

## 2025-07-07 ENCOUNTER — SOCIAL WORK (OUTPATIENT)
Dept: HEMATOLOGY/ONCOLOGY | Facility: CLINIC | Age: 55
End: 2025-07-07
Payer: MEDICARE

## 2025-07-07 ENCOUNTER — INFUSION (OUTPATIENT)
Dept: HEMATOLOGY/ONCOLOGY | Facility: CLINIC | Age: 55
End: 2025-07-07
Payer: MEDICARE

## 2025-07-07 ENCOUNTER — DOCUMENTATION (OUTPATIENT)
Dept: HEMATOLOGY/ONCOLOGY | Facility: CLINIC | Age: 55
End: 2025-07-07

## 2025-07-07 VITALS
BODY MASS INDEX: 27.26 KG/M2 | DIASTOLIC BLOOD PRESSURE: 66 MMHG | SYSTOLIC BLOOD PRESSURE: 97 MMHG | WEIGHT: 175.71 LBS | TEMPERATURE: 97.3 F | OXYGEN SATURATION: 94 % | RESPIRATION RATE: 16 BRPM | HEART RATE: 93 BPM

## 2025-07-07 DIAGNOSIS — C25.9 MALIGNANT NEOPLASM OF PANCREAS, UNSPECIFIED LOCATION OF MALIGNANCY (MULTI): ICD-10-CM

## 2025-07-07 DIAGNOSIS — C78.7 MALIGNANT NEOPLASM METASTATIC TO LIVER (MULTI): ICD-10-CM

## 2025-07-07 LAB
BASOPHILS # BLD AUTO: 0.03 X10*3/UL (ref 0–0.1)
BASOPHILS NFR BLD AUTO: 0.3 %
EOSINOPHIL # BLD AUTO: 0.23 X10*3/UL (ref 0–0.7)
EOSINOPHIL NFR BLD AUTO: 2.6 %
ERYTHROCYTE [DISTWIDTH] IN BLOOD BY AUTOMATED COUNT: 14.9 % (ref 11.5–14.5)
HCT VFR BLD AUTO: 38.1 % (ref 41–52)
HGB BLD-MCNC: 12.4 G/DL (ref 13.5–17.5)
IMM GRANULOCYTES # BLD AUTO: 0.01 X10*3/UL (ref 0–0.7)
IMM GRANULOCYTES NFR BLD AUTO: 0.1 % (ref 0–0.9)
LYMPHOCYTES # BLD AUTO: 1.12 X10*3/UL (ref 1.2–4.8)
LYMPHOCYTES NFR BLD AUTO: 12.7 %
MCH RBC QN AUTO: 30.2 PG (ref 26–34)
MCHC RBC AUTO-ENTMCNC: 32.5 G/DL (ref 32–36)
MCV RBC AUTO: 93 FL (ref 80–100)
MONOCYTES # BLD AUTO: 0.88 X10*3/UL (ref 0.1–1)
MONOCYTES NFR BLD AUTO: 10 %
NEUTROPHILS # BLD AUTO: 6.53 X10*3/UL (ref 1.2–7.7)
NEUTROPHILS NFR BLD AUTO: 74.3 %
NRBC BLD-RTO: ABNORMAL /100{WBCS}
PLATELET # BLD AUTO: 264 X10*3/UL (ref 150–450)
RBC # BLD AUTO: 4.1 X10*6/UL (ref 4.5–5.9)
WBC # BLD AUTO: 8.8 X10*3/UL (ref 4.4–11.3)

## 2025-07-07 PROCEDURE — 96413 CHEMO IV INFUSION 1 HR: CPT

## 2025-07-07 PROCEDURE — 2500000004 HC RX 250 GENERAL PHARMACY W/ HCPCS (ALT 636 FOR OP/ED): Performed by: INTERNAL MEDICINE

## 2025-07-07 PROCEDURE — 85025 COMPLETE CBC W/AUTO DIFF WBC: CPT

## 2025-07-07 PROCEDURE — 82024 ASSAY OF ACTH: CPT

## 2025-07-07 PROCEDURE — 82533 TOTAL CORTISOL: CPT

## 2025-07-07 PROCEDURE — 84075 ASSAY ALKALINE PHOSPHATASE: CPT

## 2025-07-07 PROCEDURE — 84443 ASSAY THYROID STIM HORMONE: CPT

## 2025-07-07 RX ORDER — DIPHENHYDRAMINE HYDROCHLORIDE 50 MG/ML
50 INJECTION, SOLUTION INTRAMUSCULAR; INTRAVENOUS AS NEEDED
Status: DISCONTINUED | OUTPATIENT
Start: 2025-07-07 | End: 2025-07-07 | Stop reason: HOSPADM

## 2025-07-07 RX ORDER — FAMOTIDINE 10 MG/ML
20 INJECTION, SOLUTION INTRAVENOUS ONCE AS NEEDED
Status: DISCONTINUED | OUTPATIENT
Start: 2025-07-07 | End: 2025-07-07 | Stop reason: HOSPADM

## 2025-07-07 RX ORDER — HEPARIN SODIUM,PORCINE/PF 10 UNIT/ML
50 SYRINGE (ML) INTRAVENOUS AS NEEDED
OUTPATIENT
Start: 2025-07-07

## 2025-07-07 RX ORDER — ALBUTEROL SULFATE 0.83 MG/ML
3 SOLUTION RESPIRATORY (INHALATION) AS NEEDED
Status: DISCONTINUED | OUTPATIENT
Start: 2025-07-07 | End: 2025-07-07 | Stop reason: HOSPADM

## 2025-07-07 RX ORDER — EPINEPHRINE 0.3 MG/.3ML
0.3 INJECTION SUBCUTANEOUS EVERY 5 MIN PRN
Status: DISCONTINUED | OUTPATIENT
Start: 2025-07-07 | End: 2025-07-07 | Stop reason: HOSPADM

## 2025-07-07 RX ORDER — HEPARIN 100 UNIT/ML
500 SYRINGE INTRAVENOUS AS NEEDED
OUTPATIENT
Start: 2025-07-07

## 2025-07-07 RX ORDER — PROCHLORPERAZINE MALEATE 10 MG
10 TABLET ORAL EVERY 6 HOURS PRN
Status: DISCONTINUED | OUTPATIENT
Start: 2025-07-07 | End: 2025-07-07 | Stop reason: HOSPADM

## 2025-07-07 RX ORDER — PROCHLORPERAZINE EDISYLATE 5 MG/ML
10 INJECTION INTRAMUSCULAR; INTRAVENOUS EVERY 6 HOURS PRN
Status: DISCONTINUED | OUTPATIENT
Start: 2025-07-07 | End: 2025-07-07 | Stop reason: HOSPADM

## 2025-07-07 RX ORDER — HEPARIN 100 UNIT/ML
500 SYRINGE INTRAVENOUS AS NEEDED
Status: DISCONTINUED | OUTPATIENT
Start: 2025-07-07 | End: 2025-07-07 | Stop reason: HOSPADM

## 2025-07-07 RX ADMIN — SODIUM CHLORIDE 200 MG: 9 INJECTION, SOLUTION INTRAVENOUS at 12:33

## 2025-07-07 RX ADMIN — HEPARIN 500 UNITS: 100 SYRINGE at 13:16

## 2025-07-07 ASSESSMENT — PAIN SCALES - GENERAL: PAINLEVEL_OUTOF10: 7

## 2025-07-07 NOTE — PROGRESS NOTES
This  met with the pt and his wife while in the infusion office.     The SW introduced the couple to [employer], which is an organization for Pancreatic Cancer patients. https://www.[employer].org/patients-families/patient-financial-[email or web address]   The SW gave the pt the application with instructions if they want possible assistance for monthly bills.     The SW also gave the couple brochures for [facility] and [facility], for additional free services.     The couple was thankful for the information. The SW gave them her business card as well. They will fill out the [employer] application and bring back to the SW if they would like to apply.

## 2025-07-07 NOTE — PROGRESS NOTES
"Patient here for first dose Pembro as \"last ditch\" effort per wife.  Teaching on first dose immunotherapy completed including side effects, when to call office, and \"itis's\".  Port accessed without incident per AAMIR Mueller.   Patient tolerated infusion.  Key points about immunotherapy handout given and reviewed.   Patient aware of next due date anPatient independently ambulatory off unit in NAD and without complaints.  Gait steady.  Call back instructions reviewed.  Patient verbalized understanding. d scheduling order entered.   "

## 2025-07-08 ENCOUNTER — TELEPHONE (OUTPATIENT)
Dept: ADMISSION | Facility: HOSPITAL | Age: 55
End: 2025-07-08
Payer: MEDICARE

## 2025-07-08 LAB
ALBUMIN SERPL BCP-MCNC: 3.4 G/DL (ref 3.4–5)
ALP SERPL-CCNC: 897 U/L (ref 33–120)
ALT SERPL W P-5'-P-CCNC: 56 U/L (ref 10–52)
ANION GAP SERPL CALC-SCNC: 16 MMOL/L (ref 10–20)
AST SERPL W P-5'-P-CCNC: 61 U/L (ref 9–39)
BILIRUB SERPL-MCNC: 1.3 MG/DL (ref 0–1.2)
BUN SERPL-MCNC: 17 MG/DL (ref 6–23)
CALCIUM SERPL-MCNC: 8.6 MG/DL (ref 8.6–10.6)
CHLORIDE SERPL-SCNC: 103 MMOL/L (ref 98–107)
CO2 SERPL-SCNC: 20 MMOL/L (ref 21–32)
CORTIS AM PEAK SERPL-MSCNC: 19.2 UG/DL (ref 5–20)
CREAT SERPL-MCNC: 1.65 MG/DL (ref 0.5–1.3)
EGFRCR SERPLBLD CKD-EPI 2021: 49 ML/MIN/1.73M*2
GLUCOSE SERPL-MCNC: 226 MG/DL (ref 74–99)
POTASSIUM SERPL-SCNC: 4.3 MMOL/L (ref 3.5–5.3)
PROT SERPL-MCNC: 6.5 G/DL (ref 6.4–8.2)
SODIUM SERPL-SCNC: 135 MMOL/L (ref 136–145)
TSH SERPL-ACNC: 1.59 MIU/L (ref 0.44–3.98)

## 2025-07-08 NOTE — TELEPHONE ENCOUNTER
Wife asking for clarication of upcoming appts. She thought treatment was every 28 days.   Pt has infusions: 7/18, 7/28, and 8/14.   Please clarify.

## 2025-07-08 NOTE — TELEPHONE ENCOUNTER
Appointments confirmed with pt's wife per [place] BRAGA's directives.   Wife verbalized understanding, no further questions at this time.

## 2025-07-09 ENCOUNTER — TELEPHONE (OUTPATIENT)
Dept: RADIATION ONCOLOGY | Facility: HOSPITAL | Age: 55
End: 2025-07-09
Payer: MEDICARE

## 2025-07-09 ENCOUNTER — NURSE TRIAGE (OUTPATIENT)
Dept: ADMISSION | Facility: HOSPITAL | Age: 55
End: 2025-07-09
Payer: MEDICARE

## 2025-07-09 DIAGNOSIS — R40.1 STUPOR: Primary | ICD-10-CM

## 2025-07-09 DIAGNOSIS — Z86.59 MENTAL STATUS CHANGE RESOLVED: Primary | ICD-10-CM

## 2025-07-09 LAB — ACTH PLAS-MCNC: 53.5 PG/ML (ref 7.2–63.3)

## 2025-07-09 NOTE — PROGRESS NOTES
Staff Physician: Becky Mc MD, MSCI  Resident Physician: Bright Correa MD, MPH PGY-5  Referring Physician: Duncan Nayak MD  Date of Service: 7/10/2025  RADIATION ONCOLOGY FOLLOW UP NOTE:  IDENTIFYING DATA:   Cancer Staging   Malignant neoplasm of body of pancreas (Multi)  Staging form: Exocrine Pancreas, AJCC 8th Edition  - Clinical: Stage IV (cM1) - Unsigned    Problem List Items Addressed This Visit       Malignant neoplasm of body of pancreas (Multi)    Relevant Orders    Referral to Radiation Oncology    Malignant neoplasm of pancreas, unspecified location of malignancy (Multi) - Primary    Relevant Orders    Rad Onc Intent to Treat       Mr. Foley is a 54-year-old man with metastatic pancreas adenocarcinoma, with multifocal progression and bilateral osseous iliac metastases with cortical breakthrough causing pain. Plan for palliative RT, 25Gy/5fx with SIB to GTV using DCA.  R and L Iliac . COT 6/4/25.    The patient is here as a referral from Dr. Nayak to discuss radiotherapy options for his bony metastatic disease.     INTERVAL HISTORY:  Since we last saw Deshawn Foley in clinic on 6/4/25, he has felt okay. His weight is stable from last visit and his appetite is unchanged. He has noted significant pain in the left groin and the anterior upper chest that is rated 9/10 with breakthrough pain despite on fentanyl and oxycodone. The patients notes that he has had some ongoing pain in the left groin, worsens with movement, and he may He denies abdominal pain, pain after eating, nausea, vomiting. He has had significant improvement in his pain to the bilateral hips after radiation treatment, denies any pain flares, no diarrhea noted. He has had no new medical problems or medications since our last visit. His last imaging, comprising PET/CT on 6/25/25, demonstrates interval development of multiple hepatic lesions, a right adrenal lesion, intra-abdominal nodes and soft-tissue implants, and interval  development of multiple hypermetabolic lesions in the axial and proximal appendicular skeleton. Ca 19-9 trend is below:        PAST MEDICAL, SURGICAL, FAMILY, AND SOCIAL HISTORY:  Medical History[1]  Surgical History[2]  ALLERGIES:  Allergies[3]  MEDICATIONS:  Current Medications[4]     REVIEW OF SYSTEMS:  Except for the symptoms described in the interval history, the review of systems is negative. Specifically, except as noted, when asked the patient expressed no complaints relative to constitutional (fever, weight loss), eyes, ears, nose, mouth, throat, neurologic, cardiovascular, pulmonary, breast, GI, , skin, musculoskeletal, endocrine, hematologic/lymphatic, or immunologic systems.    PERFORMANCE STATUS:  Karnofsky Performance Score/ECO, Cares for self; unable to carry on normal activity or to do active work (ECOG equivalent 1)    PHYSICAL EXAMINATION:  /81   Pulse (!) 120   Temp 36.3 °C (97.3 °F) (Temporal)   Resp 18   Wt 79 kg (174 lb 1.6 oz)   SpO2 96%   BMI 27.01 kg/m²   Pain score: 9/10  General: no acute distress, calm, cooperative, engaged in conversation.   HEENT: Normocephalic, atraumatic. Extraocular movements are intact.   Neck: supple with trachea at midline, no palpable adenopathy.   Pulmonary: Breathing comfortably on room air, no respiratory distress  Cardiovascular: Regular rate, no cyanosis, well-perfused  Abdomen: Soft, nontender, nondistended.   Extremities: No lower extremity edema or cyanosis. Normal range of motion.  Skin: Without rash or obvious lesions.   Musculoskeletal: Normal range of motion. Able to raise both arms above head without issues  Neurologic: Alert and oriented x3. Cranial nerves intact to examination. Motor strength intact bilaterally. Sensation intact to pain/fine touch throughout. No dysdiadochokinesia. Normal gait.      DIAGNOSTIC REPORTS REVIEWED:  Imaging: All imaging was personally reviewed and interpreted in clinic. Findings as per interval  history and EMR.  Laboratory/Pathology:  All pertinent labs and pathology were personally reviewed and interpreted in clinic. Findings as per interval history and EMR.     IMPRESSION:  Mr. Foley is a 54 year old male with a history of metastatic pancreatic adenocarcinoma s/p palliative RT to the iliac bone with 25Gy/5fx completed on 6/4/25. His recent PET/CT shows diffuse metastatic progression with rise in . The patient now has worsening anterior chest pain with sternum and anterior 2nd left rib osseous metastatic disease.    PLAN: For Mr. Foley, I would recommend a course of palliative intent, radioatherapy to palliate his pain. Specifically, I would recommend a course of 20Gy in 5 fractions over one week. We discussed that the goal of this is to treat his pain. We discussed that palliative RT results in pain relief in ~80% of patients with osseous metastatic involvement, but can take several weeks to achieve pain relief. The patient accepts these goals. Acute effects could include but are not limited to: fatigue, skin reaction, pain flare. Long-term effects could include but are not limited to: skin hyperpigmentation, chest wall inflammation, unlikely but possible radiation pneumonitis, scar tissue formation, and spontaneous rib fracture, as well as potential future recurrence of pain. The patient accepts these risks and signed informed consent. He knows to call us anytime with any questions or concerns.     Thank you for the opportunity to participate in the care of this kind man.      PAIN PLAN: The patient reports their pain is not well-controlled on their current regimen.  Their pain regimen is currently managed by [facility].      DISEASE STATUS: Distant Recurrence    Thank you for the opportunity to participate in the ongoing care of this pleasant man.    The patient was evaluated by and discussed with attending physician, Dr. Becky Mc, who repeated all coughlin aspects of the HPI and physical  exam.    Bright Correa MD, MPH  PGY-5 Radiation Oncology         [1]   Past Medical History:  Diagnosis Date    Asthma     Diabetes mellitus (Multi)     GERD (gastroesophageal reflux disease)     Metastatic cancer (Multi)     Pancreatic cancer (Multi)    [2]   Past Surgical History:  Procedure Laterality Date    CHOLECYSTECTOMY      FL GUIDED TRANSVAGINAL TRANSRECTAL FLUID DRAIN  09/13/2023    FL GUIDED TRANSVAGINAL TRANSRECTAL FLUID DRAIN 9/13/2023    IR ANGIOGRAM INFERIOR EPIGASTRIC  08/28/2023    IR ANGIOGRAM INFERIOR EPIGASTRIC 8/28/2023 [facility] ANGIO    IR BODY DRAIN EXCHANGE  10/09/2023    IR BODY DRAIN EXCHANGE 10/9/2023    LYMPH NODE DISSECTION      NM HEPATIC ARTERY PUMP FLOW      ABEL pump  and then removed.    PANCREAS SURGERY      pancreas tail    SPLENECTOMY, TOTAL     [3]   Allergies  Allergen Reactions    Animal Dander Other    House Dust Mite Other    Mold Other    Pollen Extracts Other    Wool Other    Grass Pollen Other    House Dust Unknown   [4]   Current Outpatient Medications:     acetaminophen (Tylenol) 325 mg tablet, Take 2 tablets (650 mg) by mouth every 6 hours., Disp: , Rfl:     Advair Diskus 250-50 mcg/dose diskus inhaler, Inhale 1 puff 2 times a day. X 1 month, Disp: , Rfl:     albuterol 90 mcg/actuation inhaler, Inhale 1 puff 3 times a day as needed., Disp: , Rfl:     fentaNYL (Duragesic) 75 mcg/hr patch, Place on the skin. (Patient taking differently: Place on the skin. 100 mcg/hour  starting last week.), Disp: , Rfl:     fluticasone (Flonase) 50 mcg/actuation nasal spray, Administer into affected nostril(s)., Disp: , Rfl:     fluticasone propion-salmeteroL (Advair Diskus) 250-50 mcg/dose diskus inhaler, Inhale 2 puffs once daily., Disp: , Rfl:     magnesium hydroxide (Milk of Magnesia) 400 mg/5 mL suspension, Take 15 mL by mouth once daily as needed for constipation., Disp: , Rfl:     ondansetron ODT (Zofran-ODT) 8 mg disintegrating tablet, Dissolve 1 tablet (8 mg) in the mouth every 8  hours if needed., Disp: , Rfl:     oxyCODONE (Roxicodone) 5 mg immediate release tablet, Take 4 tablets (20 mg) by mouth every 4 hours if needed for severe pain (7 - 10)., Disp: , Rfl:     polyethylene glycol (Glycolax, Miralax) 17 gram packet, Take 17 g by mouth once daily., Disp: , Rfl:     pregabalin (Lyrica) 75 mg capsule, Take 1 capsule (75 mg) by mouth every 6 hours., Disp: , Rfl:     rosuvastatin (Crestor) 40 mg tablet, Take 1 tablet (40 mg) by mouth once daily., Disp: , Rfl:     amLODIPine (Norvasc) 10 mg tablet, Take 1 tablet (10 mg) by mouth early in the morning.. (Patient not taking: Reported on 7/10/2025), Disp: , Rfl:     diphenoxylate-atropine (Lomotil) 2.5-0.025 mg tablet, Take 1 tablet by mouth 4 times a day as needed for diarrhea. (Patient not taking: Reported on 7/10/2025), Disp: 120 tablet, Rfl: 3    fenofibrate (Triglide) 160 mg tablet, Take 1 tablet (160 mg) by mouth once daily., Disp: , Rfl:     fexofenadine (Allegra) 180 mg tablet, Take 1 tablet (180 mg) by mouth once daily. (Patient not taking: Reported on 7/10/2025), Disp: , Rfl:     ibuprofen 400 mg tablet, Take 1 tablet (400 mg) by mouth every 6 hours if needed for moderate pain (4 - 6)., Disp: , Rfl:     loperamide (Imodium A-D) 2 mg capsule, Take 1 capsule (2 mg) by mouth every 4 hours if needed. As needed diarrhea after each loose stool not to exceed 8 capsules, or 16 mg, in 24 hours, 30 caps, (Patient not taking: Reported on 7/10/2025), Disp: , Rfl:     prochlorperazine (Compazine) 10 mg tablet, Take by mouth every 6 hours if needed for nausea. (Patient not taking: Reported on 7/10/2025), Disp: , Rfl:   No current facility-administered medications for this encounter.    Facility-Administered Medications Ordered in Other Encounters:     heparin flush 100 unit/mL syringe 500 Units, 500 Units, intra-catheter, PRN, Duncan Nayak MD    heparin flush 100 unit/mL syringe 500 Units, 500 Units, intra-catheter, PRN, Duncan Nayak MD, 500 Units  at 05/15/25 [id number]

## 2025-07-09 NOTE — TELEPHONE ENCOUNTER
Tarun aware that per Dr. Nayak since Deshawn is currently asymptomatic, OK to wait until he comes to [facility] tomorrow to have his labs drawn. No further questions or concerns at this time.  Aware to call the office back or report to ED if confusion happens again.

## 2025-07-09 NOTE — TELEPHONE ENCOUNTER
"Wife, Nisha, \"Tarun\" called to report that Deshawn had an episode of confusion yesterday. States he was taking a nap and when he woke up he did not know if he ate/drank anything yesterday, he did not know where his wife was (she was working which she always does), he then went outside to \"clean his truck\" which she said was abnormal for him. She states he just seemed disoriented though knew who he was/who she was.   She made him dinner which he ate all of. She also had him push fluids. States he was back to his normal demeanor by the time they went to sleep.   Today he is back to his normal self per spouse. He woke up and apologized to her for how he acted yesterday.   He received his first dose of Keytruda on 7/7.     Denies any headache/vision changes/slurred speech/facial drooping/extremity weakness/any other neurological changes. Patient denies any fever, shortness of breath or chest pain.     He is a diabetic, however, his glucose was normal.   She did mention she stopped giving him his BP medicine yesterday as well since his BP has been in the 90/60s since Monday (since he was here in infusion). He did c/o some dizziness yesterday as well. He did not feel as though the room was spinning, however, felt off-balance.     Team let me know your recommendations at this time.     Secure Chat sent.   "

## 2025-07-10 ENCOUNTER — HOSPITAL ENCOUNTER (OUTPATIENT)
Dept: RADIATION ONCOLOGY | Facility: HOSPITAL | Age: 55
Setting detail: RADIATION/ONCOLOGY SERIES
Discharge: HOME | End: 2025-07-10
Payer: MEDICARE

## 2025-07-10 ENCOUNTER — LAB (OUTPATIENT)
Dept: LAB | Facility: HOSPITAL | Age: 55
End: 2025-07-10
Payer: MEDICARE

## 2025-07-10 VITALS
SYSTOLIC BLOOD PRESSURE: 113 MMHG | WEIGHT: 174.1 LBS | DIASTOLIC BLOOD PRESSURE: 81 MMHG | RESPIRATION RATE: 18 BRPM | OXYGEN SATURATION: 96 % | HEART RATE: 120 BPM | TEMPERATURE: 97.3 F | BODY MASS INDEX: 27.01 KG/M2

## 2025-07-10 DIAGNOSIS — C25.1 MALIGNANT NEOPLASM OF BODY OF PANCREAS (MULTI): ICD-10-CM

## 2025-07-10 DIAGNOSIS — C78.7 MALIGNANT NEOPLASM METASTATIC TO LIVER (MULTI): ICD-10-CM

## 2025-07-10 DIAGNOSIS — C25.9 MALIGNANT NEOPLASM OF PANCREAS, UNSPECIFIED LOCATION OF MALIGNANCY (MULTI): Primary | ICD-10-CM

## 2025-07-10 DIAGNOSIS — R40.1 STUPOR: ICD-10-CM

## 2025-07-10 DIAGNOSIS — Z86.59 MENTAL STATUS CHANGE RESOLVED: ICD-10-CM

## 2025-07-10 LAB
ALBUMIN SERPL BCP-MCNC: 4 G/DL (ref 3.4–5)
ALP SERPL-CCNC: 1107 U/L (ref 33–120)
ALT SERPL W P-5'-P-CCNC: 74 U/L (ref 10–52)
ANION GAP SERPL CALC-SCNC: 16 MMOL/L (ref 10–20)
AST SERPL W P-5'-P-CCNC: 76 U/L (ref 9–39)
BASOPHILS # BLD AUTO: 0.04 X10*3/UL (ref 0–0.1)
BASOPHILS NFR BLD AUTO: 0.3 %
BILIRUB SERPL-MCNC: 2.2 MG/DL (ref 0–1.2)
BUN SERPL-MCNC: 15 MG/DL (ref 6–23)
CALCIUM SERPL-MCNC: 9.2 MG/DL (ref 8.6–10.3)
CANCER AG19-9 SERPL-ACNC: 3806.89 U/ML
CHLORIDE SERPL-SCNC: 100 MMOL/L (ref 98–107)
CO2 SERPL-SCNC: 22 MMOL/L (ref 21–32)
CREAT SERPL-MCNC: 1.52 MG/DL (ref 0.5–1.3)
EGFRCR SERPLBLD CKD-EPI 2021: 54 ML/MIN/1.73M*2
EOSINOPHIL # BLD AUTO: 0.03 X10*3/UL (ref 0–0.7)
EOSINOPHIL NFR BLD AUTO: 0.2 %
ERYTHROCYTE [DISTWIDTH] IN BLOOD BY AUTOMATED COUNT: 15.2 % (ref 11.5–14.5)
GLUCOSE SERPL-MCNC: 138 MG/DL (ref 74–99)
HCT VFR BLD AUTO: 44.4 % (ref 41–52)
HGB BLD-MCNC: 14.5 G/DL (ref 13.5–17.5)
IMM GRANULOCYTES # BLD AUTO: 0.05 X10*3/UL (ref 0–0.7)
IMM GRANULOCYTES NFR BLD AUTO: 0.4 % (ref 0–0.9)
LYMPHOCYTES # BLD AUTO: 1.73 X10*3/UL (ref 1.2–4.8)
LYMPHOCYTES NFR BLD AUTO: 12.2 %
MCH RBC QN AUTO: 30 PG (ref 26–34)
MCHC RBC AUTO-ENTMCNC: 32.7 G/DL (ref 32–36)
MCV RBC AUTO: 92 FL (ref 80–100)
MONOCYTES # BLD AUTO: 1.67 X10*3/UL (ref 0.1–1)
MONOCYTES NFR BLD AUTO: 11.8 %
NEUTROPHILS # BLD AUTO: 10.64 X10*3/UL (ref 1.2–7.7)
NEUTROPHILS NFR BLD AUTO: 75.1 %
NRBC BLD-RTO: 0 /100 WBCS (ref 0–0)
PLATELET # BLD AUTO: 266 X10*3/UL (ref 150–450)
POTASSIUM SERPL-SCNC: 4.1 MMOL/L (ref 3.5–5.3)
PROT SERPL-MCNC: 8 G/DL (ref 6.4–8.2)
RBC # BLD AUTO: 4.84 X10*6/UL (ref 4.5–5.9)
SODIUM SERPL-SCNC: 134 MMOL/L (ref 136–145)
WBC # BLD AUTO: 14.2 X10*3/UL (ref 4.4–11.3)

## 2025-07-10 PROCEDURE — 86301 IMMUNOASSAY TUMOR CA 19-9: CPT

## 2025-07-10 PROCEDURE — 80053 COMPREHEN METABOLIC PANEL: CPT

## 2025-07-10 PROCEDURE — 99215 OFFICE O/P EST HI 40 MIN: CPT | Mod: GC | Performed by: RADIOLOGY

## 2025-07-10 PROCEDURE — 82140 ASSAY OF AMMONIA: CPT

## 2025-07-10 PROCEDURE — 85025 COMPLETE CBC W/AUTO DIFF WBC: CPT

## 2025-07-10 PROCEDURE — 36415 COLL VENOUS BLD VENIPUNCTURE: CPT

## 2025-07-10 RX ORDER — IBUPROFEN 400 MG/1
400 TABLET, FILM COATED ORAL EVERY 6 HOURS PRN
COMMUNITY

## 2025-07-10 ASSESSMENT — ENCOUNTER SYMPTOMS
NERVOUS/ANXIOUS: 0
CHEST TIGHTNESS: 0
SORE THROAT: 0
MYALGIAS: 0
OCCASIONAL FEELINGS OF UNSTEADINESS: 1
TROUBLE SWALLOWING: 0
PALPITATIONS: 0
CONSTIPATION: 1
NECK PAIN: 0
VOMITING: 1
FEVER: 0
NECK STIFFNESS: 0
DIFFICULTY URINATING: 0
APPETITE CHANGE: 1
HEMOPTYSIS: 0
DYSURIA: 0
ABDOMINAL PAIN: 0
SEIZURES: 0
HEADACHES: 1
HEMATURIA: 0
NUMBNESS: 0
RECTAL PAIN: 1
FATIGUE: 1
NAUSEA: 1
ARTHRALGIAS: 1
DIARRHEA: 0
LIGHT-HEADEDNESS: 1
UNEXPECTED WEIGHT CHANGE: 1
LOSS OF SENSATION IN FEET: 0
BACK PAIN: 1
DIAPHORESIS: 1
DEPRESSION: 1
CHILLS: 0
CONFUSION: 1
VOICE CHANGE: 1
LEG SWELLING: 0
BLOOD IN STOOL: 1
SHORTNESS OF BREATH: 1
DIZZINESS: 1
ABDOMINAL DISTENTION: 0
SLEEP DISTURBANCE: 1
WHEEZING: 0
EYE PROBLEMS: 1
FREQUENCY: 0
COUGH: 1
DECREASED CONCENTRATION: 1
FLANK PAIN: 0

## 2025-07-10 ASSESSMENT — PAIN SCALES - GENERAL: PAINLEVEL_OUTOF10: 9

## 2025-07-10 NOTE — PROGRESS NOTES
Radiation Oncology Nursing Note    Pain: The patient's current pain level was assessed.  They report currently having a pain of 8 out of 10.  They feel their pain is under control with the use of pain medications.  Fentanyl patch on and oxycodone took at 6:30 am.  Across chest at nipple height; and left groin. Left groin pain worse with movement of left leg.     Review of Systems:  Review of Systems   Constitutional:  Positive for appetite change (worse nausea and not feeling like eating), diaphoresis, fatigue and unexpected weight change (a little in the loss; 10-12 # in past 3 weeks.). Negative for chills and fever.   HENT:   Positive for voice change (raspy and clearing throat often). Negative for hearing loss, mouth sores, nosebleeds, sore throat, tinnitus and trouble swallowing.    Eyes:  Positive for eye problems (wears glasses.).   Respiratory:  Positive for cough (throat clearing) and shortness of breath. Negative for chest tightness, hemoptysis and wheezing.         Unable to take deep breaths due to pain per wife     Cardiovascular:  Positive for chest pain (across chest.). Negative for leg swelling and palpitations.   Gastrointestinal:  Positive for blood in stool, constipation, nausea, rectal pain and vomiting (dry heaves). Negative for abdominal distention, abdominal pain and diarrhea.   Genitourinary:  Positive for pelvic pain (left groin). Negative for bladder incontinence, difficulty urinating, dyspareunia, dysuria, frequency, hematuria and nocturia.    Musculoskeletal:  Positive for arthralgias, back pain and gait problem (painful). Negative for flank pain, myalgias, neck pain and neck stiffness.        Iliac crest pain bilaterally 3-4/10   Skin: Negative.    Neurological:  Positive for dizziness, gait problem (painful), headaches and light-headedness. Negative for numbness and seizures.   Psychiatric/Behavioral:  Positive for confusion (more confusion tuesday afternoon through wednesday am),  decreased concentration, depression and sleep disturbance (off and on; 2 to 3 hours at a time). Negative for suicidal ideas. The patient is not nervous/anxious.

## 2025-07-11 LAB — AMMONIA PLAS-SCNC: 98 UMOL/L (ref 16–53)

## 2025-07-14 ENCOUNTER — SPECIALTY PHARMACY (OUTPATIENT)
Dept: PHARMACY | Facility: CLINIC | Age: 55
End: 2025-07-14

## 2025-07-15 ENCOUNTER — HOSPITAL ENCOUNTER (OUTPATIENT)
Dept: RADIOLOGY | Facility: EXTERNAL LOCATION | Age: 55
Discharge: HOME | End: 2025-07-15

## 2025-07-15 ENCOUNTER — HOSPITAL ENCOUNTER (OUTPATIENT)
Dept: RADIATION ONCOLOGY | Facility: HOSPITAL | Age: 55
Setting detail: RADIATION/ONCOLOGY SERIES
Discharge: HOME | End: 2025-07-15
Payer: MEDICARE

## 2025-07-15 DIAGNOSIS — C79.51 SECONDARY CANCER OF BONE (MULTI): Primary | ICD-10-CM

## 2025-07-15 DIAGNOSIS — C79.51 SECONDARY CANCER OF BONE (MULTI): ICD-10-CM

## 2025-07-15 PROCEDURE — 77290 THER RAD SIMULAJ FIELD CPLX: CPT | Performed by: RADIOLOGY

## 2025-07-17 ENCOUNTER — HOSPITAL ENCOUNTER (OUTPATIENT)
Dept: RADIATION ONCOLOGY | Facility: HOSPITAL | Age: 55
Setting detail: RADIATION/ONCOLOGY SERIES
Discharge: HOME | End: 2025-07-17
Payer: MEDICARE

## 2025-07-17 PROCEDURE — 77295 3-D RADIOTHERAPY PLAN: CPT | Performed by: RADIOLOGY

## 2025-07-17 PROCEDURE — 77334 RADIATION TREATMENT AID(S): CPT | Performed by: RADIOLOGY

## 2025-07-17 PROCEDURE — 77300 RADIATION THERAPY DOSE PLAN: CPT | Performed by: RADIOLOGY

## 2025-07-18 ENCOUNTER — INFUSION (OUTPATIENT)
Dept: HEMATOLOGY/ONCOLOGY | Facility: CLINIC | Age: 55
End: 2025-07-18
Payer: MEDICARE

## 2025-07-18 VITALS
BODY MASS INDEX: 26.94 KG/M2 | HEART RATE: 135 BPM | TEMPERATURE: 99.7 F | OXYGEN SATURATION: 94 % | WEIGHT: 171.63 LBS | RESPIRATION RATE: 14 BRPM | HEIGHT: 67 IN | DIASTOLIC BLOOD PRESSURE: 89 MMHG | SYSTOLIC BLOOD PRESSURE: 120 MMHG

## 2025-07-18 DIAGNOSIS — C78.7 MALIGNANT NEOPLASM METASTATIC TO LIVER (MULTI): ICD-10-CM

## 2025-07-18 PROCEDURE — 2500000004 HC RX 250 GENERAL PHARMACY W/ HCPCS (ALT 636 FOR OP/ED): Performed by: INTERNAL MEDICINE

## 2025-07-18 PROCEDURE — 96365 THER/PROPH/DIAG IV INF INIT: CPT | Mod: INF

## 2025-07-18 RX ORDER — DIPHENHYDRAMINE HYDROCHLORIDE 50 MG/ML
50 INJECTION, SOLUTION INTRAMUSCULAR; INTRAVENOUS AS NEEDED
OUTPATIENT
Start: 2025-08-07

## 2025-07-18 RX ORDER — ZOLEDRONIC ACID 0.04 MG/ML
4 INJECTION, SOLUTION INTRAVENOUS ONCE
Status: COMPLETED | OUTPATIENT
Start: 2025-07-18 | End: 2025-07-18

## 2025-07-18 RX ORDER — ZOLEDRONIC ACID 0.04 MG/ML
4 INJECTION, SOLUTION INTRAVENOUS ONCE
OUTPATIENT
Start: 2025-08-07

## 2025-07-18 RX ORDER — ALBUTEROL SULFATE 0.83 MG/ML
3 SOLUTION RESPIRATORY (INHALATION) AS NEEDED
OUTPATIENT
Start: 2025-08-07

## 2025-07-18 RX ORDER — HEPARIN SODIUM,PORCINE/PF 10 UNIT/ML
50 SYRINGE (ML) INTRAVENOUS AS NEEDED
Status: DISCONTINUED | OUTPATIENT
Start: 2025-07-18 | End: 2025-07-18 | Stop reason: HOSPADM

## 2025-07-18 RX ORDER — DIPHENHYDRAMINE HYDROCHLORIDE 50 MG/ML
50 INJECTION, SOLUTION INTRAMUSCULAR; INTRAVENOUS AS NEEDED
Status: DISCONTINUED | OUTPATIENT
Start: 2025-07-18 | End: 2025-07-18 | Stop reason: HOSPADM

## 2025-07-18 RX ORDER — EPINEPHRINE 0.3 MG/.3ML
0.3 INJECTION SUBCUTANEOUS EVERY 5 MIN PRN
OUTPATIENT
Start: 2025-08-07

## 2025-07-18 RX ORDER — HEPARIN 100 UNIT/ML
500 SYRINGE INTRAVENOUS AS NEEDED
Status: DISCONTINUED | OUTPATIENT
Start: 2025-07-18 | End: 2025-07-18 | Stop reason: HOSPADM

## 2025-07-18 RX ORDER — HEPARIN 100 UNIT/ML
500 SYRINGE INTRAVENOUS AS NEEDED
OUTPATIENT
Start: 2025-07-18

## 2025-07-18 RX ORDER — FAMOTIDINE 10 MG/ML
20 INJECTION, SOLUTION INTRAVENOUS ONCE AS NEEDED
Status: DISCONTINUED | OUTPATIENT
Start: 2025-07-18 | End: 2025-07-18 | Stop reason: HOSPADM

## 2025-07-18 RX ORDER — EPINEPHRINE 0.3 MG/.3ML
0.3 INJECTION SUBCUTANEOUS EVERY 5 MIN PRN
Status: DISCONTINUED | OUTPATIENT
Start: 2025-07-18 | End: 2025-07-18 | Stop reason: HOSPADM

## 2025-07-18 RX ORDER — HEPARIN SODIUM,PORCINE/PF 10 UNIT/ML
50 SYRINGE (ML) INTRAVENOUS AS NEEDED
OUTPATIENT
Start: 2025-07-18

## 2025-07-18 RX ORDER — ALBUTEROL SULFATE 0.83 MG/ML
3 SOLUTION RESPIRATORY (INHALATION) AS NEEDED
Status: DISCONTINUED | OUTPATIENT
Start: 2025-07-18 | End: 2025-07-18 | Stop reason: HOSPADM

## 2025-07-18 RX ORDER — FAMOTIDINE 10 MG/ML
20 INJECTION, SOLUTION INTRAVENOUS ONCE AS NEEDED
OUTPATIENT
Start: 2025-08-07

## 2025-07-18 RX ADMIN — ZOLEDRONIC ACID 4 MG: 0.04 INJECTION, SOLUTION INTRAVENOUS at 15:20

## 2025-07-18 RX ADMIN — HEPARIN 500 UNITS: 100 SYRINGE at 15:58

## 2025-07-18 ASSESSMENT — PAIN SCALES - GENERAL: PAINLEVEL_OUTOF10: 6

## 2025-07-18 NOTE — PROGRESS NOTES
[facility] Infusion Note     Deshawn Foley is a 54 y.o. year old male here today,07/18/25,  in the [facility] infusion center for the following treatment regimen:    Zoledronic Acid (Zometa), Every 28 Days     Medications given:    Administrations This Visit       heparin flush 100 unit/mL syringe 500 Units       Admin Date  07/18/2025 Action  Given Dose  500 Units Route  intra-catheter Documented By  Otilia Jean RN              zoledronic acid (Zometa) 4 mg  mL       Admin Date  07/18/2025 Action  New Bag Dose  4 mg Rate  200 mL/hr Route  intravenous Documented By  Otilia Jean RN                Pt tolerated medications listed above well and was discharged to home in stable condition. Pt had no further questions or concerns at this time. Pt ambulated off the unit in a wheelchair that RN pushed moving with a slow and steady gait.

## 2025-07-21 ENCOUNTER — TELEPHONE (OUTPATIENT)
Dept: HEMATOLOGY/ONCOLOGY | Facility: CLINIC | Age: 55
End: 2025-07-21

## 2025-07-23 ENCOUNTER — HOSPITAL ENCOUNTER (INPATIENT)
Facility: HOSPITAL | Age: 55
LOS: 3 days | Discharge: HOME | End: 2025-07-26
Attending: INTERNAL MEDICINE | Admitting: INTERNAL MEDICINE
Payer: MEDICARE

## 2025-07-23 ENCOUNTER — OFFICE VISIT (OUTPATIENT)
Dept: SURGICAL ONCOLOGY | Facility: HOSPITAL | Age: 55
End: 2025-07-23
Payer: MEDICARE

## 2025-07-23 DIAGNOSIS — J45.901 ASTHMA WITH ACUTE EXACERBATION, UNSPECIFIED ASTHMA SEVERITY, UNSPECIFIED WHETHER PERSISTENT (HHS-HCC): ICD-10-CM

## 2025-07-23 DIAGNOSIS — C25.9 MALIGNANT NEOPLASM OF PANCREAS, UNSPECIFIED LOCATION OF MALIGNANCY (MULTI): ICD-10-CM

## 2025-07-23 DIAGNOSIS — C78.7 MALIGNANT NEOPLASM METASTATIC TO LIVER (MULTI): ICD-10-CM

## 2025-07-23 DIAGNOSIS — R11.10 VOMITING: Primary | ICD-10-CM

## 2025-07-23 DIAGNOSIS — C78.7 METASTATIC CARCINOMA TO LIVER: ICD-10-CM

## 2025-07-23 DIAGNOSIS — G89.3 CANCER ASSOCIATED PAIN: ICD-10-CM

## 2025-07-23 LAB
ALBUMIN SERPL BCP-MCNC: 2.9 G/DL (ref 3.4–5)
ALP SERPL-CCNC: 985 U/L (ref 33–120)
ALT SERPL W P-5'-P-CCNC: 49 U/L (ref 10–52)
ANION GAP SERPL CALC-SCNC: 13 MMOL/L (ref 10–20)
AST SERPL W P-5'-P-CCNC: 96 U/L (ref 9–39)
BASOPHILS # BLD MANUAL: 0 X10*3/UL (ref 0–0.1)
BASOPHILS NFR BLD MANUAL: 0 %
BILIRUB SERPL-MCNC: 3.6 MG/DL (ref 0–1.2)
BLASTS # BLD MANUAL: 0 X10*3/UL
BLASTS NFR BLD MANUAL: 0 %
BUN SERPL-MCNC: 14 MG/DL (ref 6–23)
CALCIUM SERPL-MCNC: 7.1 MG/DL (ref 8.6–10.6)
CHLORIDE SERPL-SCNC: 96 MMOL/L (ref 98–107)
CK SERPL-CCNC: 190 U/L (ref 0–325)
CO2 SERPL-SCNC: 28 MMOL/L (ref 21–32)
CREAT SERPL-MCNC: 1 MG/DL (ref 0.5–1.3)
EGFRCR SERPLBLD CKD-EPI 2021: 89 ML/MIN/1.73M*2
EOSINOPHIL # BLD MANUAL: 0 X10*3/UL (ref 0–0.7)
EOSINOPHIL NFR BLD MANUAL: 0 %
ERYTHROCYTE [DISTWIDTH] IN BLOOD BY AUTOMATED COUNT: 15.9 % (ref 11.5–14.5)
GLUCOSE BLD MANUAL STRIP-MCNC: 152 MG/DL (ref 74–99)
GLUCOSE SERPL-MCNC: 149 MG/DL (ref 74–99)
HCT VFR BLD AUTO: 33.2 % (ref 41–52)
HGB BLD-MCNC: 11.7 G/DL (ref 13.5–17.5)
IMM GRANULOCYTES # BLD AUTO: 0.05 X10*3/UL (ref 0–0.7)
IMM GRANULOCYTES NFR BLD AUTO: 0.6 % (ref 0–0.9)
LYMPHOCYTES # BLD MANUAL: 0.63 X10*3/UL (ref 1.2–4.8)
LYMPHOCYTES NFR BLD MANUAL: 7 %
MAGNESIUM SERPL-MCNC: 2.87 MG/DL (ref 1.6–2.4)
MCH RBC QN AUTO: 30 PG (ref 26–34)
MCHC RBC AUTO-ENTMCNC: 35.2 G/DL (ref 32–36)
MCV RBC AUTO: 85 FL (ref 80–100)
METAMYELOCYTES # BLD MANUAL: 0 X10*3/UL
METAMYELOCYTES NFR BLD MANUAL: 0 %
MONOCYTES # BLD MANUAL: 0.7 X10*3/UL (ref 0.1–1)
MONOCYTES NFR BLD MANUAL: 7.8 %
MYELOCYTES # BLD MANUAL: 0 X10*3/UL
MYELOCYTES NFR BLD MANUAL: 0 %
NEUTROPHILS # BLD MANUAL: 7.67 X10*3/UL (ref 1.2–7.7)
NEUTS BAND # BLD MANUAL: 1.1 X10*3/UL (ref 0–0.7)
NEUTS BAND NFR BLD MANUAL: 12.2 %
NEUTS SEG # BLD MANUAL: 6.57 X10*3/UL (ref 1.2–7)
NEUTS SEG NFR BLD MANUAL: 73 %
NRBC BLD MANUAL-RTO: 0 % (ref 0–0)
NRBC BLD-RTO: 0.2 /100 WBCS (ref 0–0)
OVALOCYTES BLD QL SMEAR: ABNORMAL
PHOSPHATE SERPL-MCNC: 2.3 MG/DL (ref 2.5–4.9)
PLASMA CELLS # BLD MANUAL: 0 X10*3/UL
PLASMA CELLS NFR BLD MANUAL: 0 %
PLATELET # BLD AUTO: 330 X10*3/UL (ref 150–450)
POLYCHROMASIA BLD QL SMEAR: ABNORMAL
POTASSIUM SERPL-SCNC: 2.9 MMOL/L (ref 3.5–5.3)
PROMYELOCYTES # BLD MANUAL: 0 X10*3/UL
PROMYELOCYTES NFR BLD MANUAL: 0 %
PROT SERPL-MCNC: 6.3 G/DL (ref 6.4–8.2)
RBC # BLD AUTO: 3.9 X10*6/UL (ref 4.5–5.9)
RBC MORPH BLD: ABNORMAL
SCHISTOCYTES BLD QL SMEAR: ABNORMAL
SODIUM SERPL-SCNC: 134 MMOL/L (ref 136–145)
TARGETS BLD QL SMEAR: ABNORMAL
TOTAL CELLS COUNTED BLD: 115
VARIANT LYMPHS # BLD MANUAL: 0 X10*3/UL (ref 0–0.5)
VARIANT LYMPHS NFR BLD: 0 %
WBC # BLD AUTO: 9 X10*3/UL (ref 4.4–11.3)
WBC OTHER # BLD MANUAL: 0 X10*3/UL
WBC OTHER NFR BLD MANUAL: 0 %

## 2025-07-23 PROCEDURE — 93010 ELECTROCARDIOGRAM REPORT: CPT | Performed by: INTERNAL MEDICINE

## 2025-07-23 PROCEDURE — 87081 CULTURE SCREEN ONLY: CPT

## 2025-07-23 PROCEDURE — 93005 ELECTROCARDIOGRAM TRACING: CPT

## 2025-07-23 PROCEDURE — 83735 ASSAY OF MAGNESIUM: CPT

## 2025-07-23 PROCEDURE — 2500000004 HC RX 250 GENERAL PHARMACY W/ HCPCS (ALT 636 FOR OP/ED)

## 2025-07-23 PROCEDURE — 2500000005 HC RX 250 GENERAL PHARMACY W/O HCPCS

## 2025-07-23 PROCEDURE — 82550 ASSAY OF CK (CPK): CPT

## 2025-07-23 PROCEDURE — 1170000001 HC PRIVATE ONCOLOGY ROOM DAILY

## 2025-07-23 PROCEDURE — 99222 1ST HOSP IP/OBS MODERATE 55: CPT

## 2025-07-23 PROCEDURE — 2500000001 HC RX 250 WO HCPCS SELF ADMINISTERED DRUGS (ALT 637 FOR MEDICARE OP)

## 2025-07-23 PROCEDURE — 85007 BL SMEAR W/DIFF WBC COUNT: CPT

## 2025-07-23 PROCEDURE — 84520 ASSAY OF UREA NITROGEN: CPT

## 2025-07-23 PROCEDURE — 99497 ADVNCD CARE PLAN 30 MIN: CPT | Performed by: INTERNAL MEDICINE

## 2025-07-23 PROCEDURE — 84100 ASSAY OF PHOSPHORUS: CPT

## 2025-07-23 PROCEDURE — 85027 COMPLETE CBC AUTOMATED: CPT

## 2025-07-23 PROCEDURE — 82947 ASSAY GLUCOSE BLOOD QUANT: CPT

## 2025-07-23 PROCEDURE — 80053 COMPREHEN METABOLIC PANEL: CPT

## 2025-07-23 PROCEDURE — 99223 1ST HOSP IP/OBS HIGH 75: CPT | Performed by: INTERNAL MEDICINE

## 2025-07-23 RX ORDER — FLUTICASONE PROPIONATE 50 MCG
1 SPRAY, SUSPENSION (ML) NASAL DAILY
Status: DISCONTINUED | OUTPATIENT
Start: 2025-07-23 | End: 2025-07-26 | Stop reason: HOSPADM

## 2025-07-23 RX ORDER — ENOXAPARIN SODIUM 100 MG/ML
40 INJECTION SUBCUTANEOUS DAILY
Status: DISCONTINUED | OUTPATIENT
Start: 2025-07-24 | End: 2025-07-26 | Stop reason: HOSPADM

## 2025-07-23 RX ORDER — PROCHLORPERAZINE EDISYLATE 5 MG/ML
10 INJECTION INTRAMUSCULAR; INTRAVENOUS EVERY 6 HOURS PRN
Status: DISCONTINUED | OUTPATIENT
Start: 2025-07-23 | End: 2025-07-23

## 2025-07-23 RX ORDER — FLUTICASONE FUROATE AND VILANTEROL 200; 25 UG/1; UG/1
1 POWDER RESPIRATORY (INHALATION)
Status: DISCONTINUED | OUTPATIENT
Start: 2025-07-23 | End: 2025-07-26 | Stop reason: HOSPADM

## 2025-07-23 RX ORDER — ACETAMINOPHEN 325 MG/1
650 TABLET ORAL EVERY 6 HOURS PRN
Status: DISCONTINUED | OUTPATIENT
Start: 2025-07-23 | End: 2025-07-26 | Stop reason: HOSPADM

## 2025-07-23 RX ORDER — CYCLOBENZAPRINE HCL 10 MG
5 TABLET ORAL 3 TIMES DAILY PRN
Status: DISCONTINUED | OUTPATIENT
Start: 2025-07-23 | End: 2025-07-26 | Stop reason: HOSPADM

## 2025-07-23 RX ORDER — ONDANSETRON HYDROCHLORIDE 2 MG/ML
8 INJECTION, SOLUTION INTRAVENOUS EVERY 8 HOURS PRN
Status: DISCONTINUED | OUTPATIENT
Start: 2025-07-23 | End: 2025-07-23

## 2025-07-23 RX ORDER — ALBUTEROL SULFATE 90 UG/1
1 INHALANT RESPIRATORY (INHALATION) 3 TIMES DAILY PRN
Status: DISCONTINUED | OUTPATIENT
Start: 2025-07-23 | End: 2025-07-26 | Stop reason: HOSPADM

## 2025-07-23 RX ORDER — ONDANSETRON 8 MG/1
8 TABLET, ORALLY DISINTEGRATING ORAL EVERY 8 HOURS PRN
Status: DISCONTINUED | OUTPATIENT
Start: 2025-07-23 | End: 2025-07-23

## 2025-07-23 RX ORDER — HEPARIN SODIUM 5000 [USP'U]/ML
5000 INJECTION, SOLUTION INTRAVENOUS; SUBCUTANEOUS EVERY 8 HOURS SCHEDULED
Status: DISPENSED | OUTPATIENT
Start: 2025-07-23 | End: 2025-07-23

## 2025-07-23 RX ORDER — ROSUVASTATIN CALCIUM 10 MG/1
40 TABLET, COATED ORAL DAILY
Status: DISCONTINUED | OUTPATIENT
Start: 2025-07-23 | End: 2025-07-23

## 2025-07-23 RX ORDER — POTASSIUM CHLORIDE 14.9 MG/ML
20 INJECTION INTRAVENOUS
Status: COMPLETED | OUTPATIENT
Start: 2025-07-23 | End: 2025-07-23

## 2025-07-23 RX ORDER — FENOFIBRATE 160 MG/1
160 TABLET ORAL DAILY
Status: DISCONTINUED | OUTPATIENT
Start: 2025-07-23 | End: 2025-07-26 | Stop reason: HOSPADM

## 2025-07-23 RX ORDER — POLYETHYLENE GLYCOL 3350 17 G/17G
17 POWDER, FOR SOLUTION ORAL DAILY PRN
Status: DISCONTINUED | OUTPATIENT
Start: 2025-07-23 | End: 2025-07-26 | Stop reason: HOSPADM

## 2025-07-23 RX ORDER — ADHESIVE BANDAGE
15 BANDAGE TOPICAL DAILY PRN
Status: DISCONTINUED | OUTPATIENT
Start: 2025-07-23 | End: 2025-07-26 | Stop reason: HOSPADM

## 2025-07-23 RX ORDER — FENTANYL 100 UG/H
1 PATCH TRANSDERMAL
COMMUNITY
End: 2025-07-26 | Stop reason: HOSPADM

## 2025-07-23 RX ORDER — PREGABALIN 75 MG/1
150 CAPSULE ORAL NIGHTLY
Status: DISCONTINUED | OUTPATIENT
Start: 2025-07-23 | End: 2025-07-26 | Stop reason: HOSPADM

## 2025-07-23 RX ORDER — PREGABALIN 75 MG/1
75 CAPSULE ORAL EVERY 6 HOURS
Status: DISCONTINUED | OUTPATIENT
Start: 2025-07-23 | End: 2025-07-23

## 2025-07-23 RX ORDER — OXYCODONE HYDROCHLORIDE 10 MG/1
20 TABLET ORAL EVERY 4 HOURS PRN
Refills: 0 | Status: DISCONTINUED | OUTPATIENT
Start: 2025-07-23 | End: 2025-07-24

## 2025-07-23 RX ORDER — PROCHLORPERAZINE MALEATE 10 MG
10 TABLET ORAL EVERY 6 HOURS PRN
Status: DISCONTINUED | OUTPATIENT
Start: 2025-07-23 | End: 2025-07-23

## 2025-07-23 RX ORDER — FENTANYL 100 UG/H
1 PATCH TRANSDERMAL
Refills: 0 | Status: DISCONTINUED | OUTPATIENT
Start: 2025-07-24 | End: 2025-07-25

## 2025-07-23 RX ORDER — LORAZEPAM 0.5 MG/1
0.5 TABLET ORAL 4 TIMES DAILY
Status: DISCONTINUED | OUTPATIENT
Start: 2025-07-23 | End: 2025-07-24

## 2025-07-23 RX ORDER — LORAZEPAM 1 MG/1
1 TABLET ORAL EVERY 6 HOURS PRN
COMMUNITY

## 2025-07-23 RX ORDER — PREGABALIN 75 MG/1
75 CAPSULE ORAL 2 TIMES DAILY
Status: DISCONTINUED | OUTPATIENT
Start: 2025-07-23 | End: 2025-07-26 | Stop reason: HOSPADM

## 2025-07-23 RX ADMIN — PREGABALIN 150 MG: 75 CAPSULE ORAL at 20:45

## 2025-07-23 RX ADMIN — CYCLOBENZAPRINE 5 MG: 10 TABLET, FILM COATED ORAL at 15:13

## 2025-07-23 RX ADMIN — ACETAMINOPHEN 650 MG: 325 TABLET ORAL at 23:55

## 2025-07-23 RX ADMIN — LORAZEPAM 0.5 MG: 0.5 TABLET ORAL at 16:57

## 2025-07-23 RX ADMIN — HYDROMORPHONE HYDROCHLORIDE 0.3 MG: 1 INJECTION, SOLUTION INTRAMUSCULAR; INTRAVENOUS; SUBCUTANEOUS at 14:27

## 2025-07-23 RX ADMIN — ACETAMINOPHEN 650 MG: 325 TABLET ORAL at 13:18

## 2025-07-23 RX ADMIN — CYCLOBENZAPRINE 5 MG: 10 TABLET, FILM COATED ORAL at 23:55

## 2025-07-23 RX ADMIN — POTASSIUM CHLORIDE 20 MEQ: 14.9 INJECTION, SOLUTION INTRAVENOUS at 16:56

## 2025-07-23 RX ADMIN — OXYCODONE HYDROCHLORIDE 20 MG: 10 TABLET ORAL at 13:18

## 2025-07-23 RX ADMIN — POTASSIUM CHLORIDE 20 MEQ: 14.9 INJECTION, SOLUTION INTRAVENOUS at 15:09

## 2025-07-23 RX ADMIN — LORAZEPAM 0.5 MG: 0.5 TABLET ORAL at 20:45

## 2025-07-23 RX ADMIN — LORAZEPAM 0.5 MG: 0.5 TABLET ORAL at 14:27

## 2025-07-23 RX ADMIN — POTASSIUM PHOSPHATE, MONOBASIC POTASSIUM PHOSPHATE, DIBASIC 15 MMOL: 224; 236 INJECTION, SOLUTION, CONCENTRATE INTRAVENOUS at 19:08

## 2025-07-23 RX ADMIN — PREGABALIN 75 MG: 75 CAPSULE ORAL at 15:09

## 2025-07-23 RX ADMIN — OXYCODONE HYDROCHLORIDE 20 MG: 10 TABLET ORAL at 20:45

## 2025-07-23 SDOH — SOCIAL STABILITY: SOCIAL INSECURITY: ABUSE: ADULT

## 2025-07-23 SDOH — SOCIAL STABILITY: SOCIAL INSECURITY: DO YOU FEEL ANYONE HAS EXPLOITED OR TAKEN ADVANTAGE OF YOU FINANCIALLY OR OF YOUR PERSONAL PROPERTY?: NO

## 2025-07-23 SDOH — SOCIAL STABILITY: SOCIAL INSECURITY: HAVE YOU HAD THOUGHTS OF HARMING ANYONE ELSE?: NO

## 2025-07-23 SDOH — SOCIAL STABILITY: SOCIAL INSECURITY: DO YOU FEEL UNSAFE GOING BACK TO THE PLACE WHERE YOU ARE LIVING?: NO

## 2025-07-23 SDOH — SOCIAL STABILITY: SOCIAL INSECURITY: WERE YOU ABLE TO COMPLETE ALL THE BEHAVIORAL HEALTH SCREENINGS?: YES

## 2025-07-23 SDOH — SOCIAL STABILITY: SOCIAL INSECURITY: DOES ANYONE TRY TO KEEP YOU FROM HAVING/CONTACTING OTHER FRIENDS OR DOING THINGS OUTSIDE YOUR HOME?: NO

## 2025-07-23 SDOH — SOCIAL STABILITY: SOCIAL INSECURITY: ARE THERE ANY APPARENT SIGNS OF INJURIES/BEHAVIORS THAT COULD BE RELATED TO ABUSE/NEGLECT?: NO

## 2025-07-23 SDOH — SOCIAL STABILITY: SOCIAL INSECURITY: ARE YOU OR HAVE YOU BEEN THREATENED OR ABUSED PHYSICALLY, EMOTIONALLY, OR SEXUALLY BY ANYONE?: NO

## 2025-07-23 SDOH — SOCIAL STABILITY: SOCIAL INSECURITY: HAS ANYONE EVER THREATENED TO HURT YOUR FAMILY OR YOUR PETS?: NO

## 2025-07-23 SDOH — SOCIAL STABILITY: SOCIAL INSECURITY: HAVE YOU HAD ANY THOUGHTS OF HARMING ANYONE ELSE?: NO

## 2025-07-23 ASSESSMENT — COGNITIVE AND FUNCTIONAL STATUS - GENERAL
WALKING IN HOSPITAL ROOM: A LITTLE
PERSONAL GROOMING: A LITTLE
MOVING TO AND FROM BED TO CHAIR: A LITTLE
DAILY ACTIVITIY SCORE: 18
EATING MEALS: A LITTLE
EATING MEALS: A LITTLE
DAILY ACTIVITIY SCORE: 18
PERSONAL GROOMING: A LITTLE
STANDING UP FROM CHAIR USING ARMS: A LITTLE
STANDING UP FROM CHAIR USING ARMS: A LITTLE
DRESSING REGULAR LOWER BODY CLOTHING: A LITTLE
WALKING IN HOSPITAL ROOM: A LITTLE
CLIMB 3 TO 5 STEPS WITH RAILING: A LITTLE
MOBILITY SCORE: 18
TURNING FROM BACK TO SIDE WHILE IN FLAT BAD: A LITTLE
MOVING FROM LYING ON BACK TO SITTING ON SIDE OF FLAT BED WITH BEDRAILS: A LITTLE
DRESSING REGULAR UPPER BODY CLOTHING: A LITTLE
CLIMB 3 TO 5 STEPS WITH RAILING: A LITTLE
DRESSING REGULAR UPPER BODY CLOTHING: A LITTLE
MOBILITY SCORE: 18
HELP NEEDED FOR BATHING: A LITTLE
TURNING FROM BACK TO SIDE WHILE IN FLAT BAD: A LITTLE
MOVING FROM LYING ON BACK TO SITTING ON SIDE OF FLAT BED WITH BEDRAILS: A LITTLE
MOVING TO AND FROM BED TO CHAIR: A LITTLE
DRESSING REGULAR LOWER BODY CLOTHING: A LITTLE
TOILETING: A LITTLE
TOILETING: A LITTLE
HELP NEEDED FOR BATHING: A LITTLE

## 2025-07-23 ASSESSMENT — ACTIVITIES OF DAILY LIVING (ADL)
FEEDING YOURSELF: INDEPENDENT
HEARING - RIGHT EAR: FUNCTIONAL
DRESSING YOURSELF: NEEDS ASSISTANCE
WALKS IN HOME: NEEDS ASSISTANCE
GROOMING: NEEDS ASSISTANCE
PATIENT'S MEMORY ADEQUATE TO SAFELY COMPLETE DAILY ACTIVITIES?: YES
ADEQUATE_TO_COMPLETE_ADL: YES
JUDGMENT_ADEQUATE_SAFELY_COMPLETE_DAILY_ACTIVITIES: YES
BATHING: NEEDS ASSISTANCE
ASSISTIVE_DEVICE: WALKER;CANE
TOILETING: NEEDS ASSISTANCE
HEARING - LEFT EAR: FUNCTIONAL

## 2025-07-23 ASSESSMENT — PAIN - FUNCTIONAL ASSESSMENT
PAIN_FUNCTIONAL_ASSESSMENT: 0-10

## 2025-07-23 ASSESSMENT — ENCOUNTER SYMPTOMS
DIARRHEA: 1
MYALGIAS: 1
VOMITING: 1
ALLERGIC/IMMUNOLOGIC NEGATIVE: 1
RESPIRATORY NEGATIVE: 1
ENDOCRINE NEGATIVE: 1
BACK PAIN: 1
EYES NEGATIVE: 1
ABDOMINAL PAIN: 1
NAUSEA: 1

## 2025-07-23 ASSESSMENT — PAIN SCALES - GENERAL
PAINLEVEL_OUTOF10: 8
PAINLEVEL_OUTOF10: 8
PAINLEVEL_OUTOF10: 9
PAINLEVEL_OUTOF10: 5 - MODERATE PAIN
PAINLEVEL_OUTOF10: 8
PAINLEVEL_OUTOF10: 8

## 2025-07-23 ASSESSMENT — LIFESTYLE VARIABLES
HOW OFTEN DO YOU HAVE A DRINK CONTAINING ALCOHOL: NEVER
HOW MANY STANDARD DRINKS CONTAINING ALCOHOL DO YOU HAVE ON A TYPICAL DAY: PATIENT DOES NOT DRINK
AUDIT-C TOTAL SCORE: 0
AUDIT-C TOTAL SCORE: 0
SKIP TO QUESTIONS 9-10: 1
HOW OFTEN DO YOU HAVE 6 OR MORE DRINKS ON ONE OCCASION: NEVER

## 2025-07-23 ASSESSMENT — PATIENT HEALTH QUESTIONNAIRE - PHQ9
1. LITTLE INTEREST OR PLEASURE IN DOING THINGS: MORE THAN HALF THE DAYS
2. FEELING DOWN, DEPRESSED OR HOPELESS: MORE THAN HALF THE DAYS
SUM OF ALL RESPONSES TO PHQ9 QUESTIONS 1 & 2: 4

## 2025-07-23 NOTE — CARE PLAN
Problem: Pain - Adult  Goal: Verbalizes/displays adequate comfort level or baseline comfort level  Outcome: Progressing     Problem: Safety - Adult  Goal: Free from fall injury  Outcome: Progressing     Problem: Discharge Planning  Goal: Discharge to home or other facility with appropriate resources  Outcome: Progressing     Problem: Chronic Conditions and Co-morbidities  Goal: Patient's chronic conditions and co-morbidity symptoms are monitored and maintained or improved  Outcome: Progressing     Problem: Nutrition  Goal: Nutrient intake appropriate for maintaining nutritional needs  Outcome: Progressing     Problem: Skin  Goal: Decreased wound size/increased tissue granulation at next dressing change  Outcome: Progressing  Flowsheets (Taken 7/23/2025 1730)  Decreased wound size/increased tissue granulation at next dressing change: Promote sleep for wound healing  Goal: Participates in plan/prevention/treatment measures  Outcome: Progressing  Flowsheets (Taken 7/23/2025 1730)  Participates in plan/prevention/treatment measures: Discuss with provider PT/OT consult  Goal: Prevent/manage excess moisture  Outcome: Progressing  Flowsheets (Taken 7/23/2025 1730)  Prevent/manage excess moisture: Cleanse incontinence/protect with barrier cream  Goal: Prevent/minimize sheer/friction injuries  Outcome: Progressing  Flowsheets (Taken 7/23/2025 1730)  Prevent/minimize sheer/friction injuries:   Turn/reposition every 2 hours/use positioning/transfer devices   Use pull sheet   HOB 30 degrees or less  Goal: Promote/optimize nutrition  Outcome: Progressing  Flowsheets (Taken 7/23/2025 1730)  Promote/optimize nutrition: Consume > 50% meals/supplements  Goal: Promote skin healing  Outcome: Progressing  Flowsheets (Taken 7/23/2025 1730)  Promote skin healing: Turn/reposition every 2 hours/use positioning/transfer devices   The patient's goals for the shift include  pain control    The clinical goals for the shift include pain  control

## 2025-07-23 NOTE — H&P
History Of Present Illness  Deshawn Foley is a 54 y.o. male with a PMH of metastatic pancreatic adenocarcinoma (with mets), HLD, anxiety, and asthma who is presenting with nausea, vomiting, diarrhea in the setting of metastatic pancreatic adenocarcinoma with multifocal progression.    Pt presents with his wife (Nisha) that states that severe nausea and vomiting started yesterday afternoon along with severe diarrhea. Additionally, she states that patient has had significant lack in appetite and has not been able to tolerate any solids, only liquids. She states pt has been in significant pain recently. In regards to pain, pt states it is most severe in his left groin and anterior upper chest where there are specific sites of cancer located. He additionally has muscle cramps in his legs. Pt and family are aware of the ongoing increase in disease burden and are moving towards hospice.     Upon admission, pt was unable to tolerate oral intake or medications and endorsed significant pain. Additionally, labs significant for hypokalemia (2.9), hypophosphatemia (2.3), and elevated LFTs (ALT 49, AST 96, Alk Phos 985, T Bili 3.6)    Regarding oncologist history, pt was diagnosed initially in 09/2022 and has been on many regimens    Oncologic History  - Diagnosed in 09/2022 with metastatic disease to liver  - 10/5/22-10/9/22: 2 cycles of FOLFIRNOX but discontinued due to intractable nausea, vomiting, diarrhea, colitis  - 11/16/23-05/2023: Gemcitabine plus Abraxane  - 07/10/23: S/p distal pancreatectomy and had pump placement for ABEL chemotherapy  - 10/09/23: FUDR via Medtronic ABEL pump (In [place]) along with gem/abraxane  - 07/2024: Switched to cape/iri   - 10/2024: Started Afatinib  - 05/2025: Switch to osimertinib       Past Medical History  He has a past medical history of Asthma, Diabetes mellitus (Multi), GERD (gastroesophageal reflux disease), Metastatic cancer (Multi), and Pancreatic cancer (Multi).    He has no  past medical history of Chronic kidney disease.    Surgical History  He has a past surgical history that includes IR angiogram inferior epigastric (08/28/2023); IR body drain exchange (10/09/2023); FL guided transvaginal transrectal fluid drain (09/13/2023); Cholecystectomy; Pancreas surgery; Splenectomy, total; Lymph node dissection; and NM hepatic artery pump flow.     Social History  He reports that he has never smoked. He has been exposed to tobacco smoke. He has never used smokeless tobacco. He reports that he does not currently use alcohol. He reports that he does not currently use drugs.    Family History  Family History[1]     Allergies  Animal dander, House dust mite, Mold, Pollen extracts, Wool, Grass pollen, and House dust    Review of Systems   HENT: Negative.     Eyes: Negative.    Respiratory: Negative.     Gastrointestinal:  Positive for abdominal pain, diarrhea, nausea and vomiting.   Endocrine: Negative.    Musculoskeletal:  Positive for back pain and myalgias.   Allergic/Immunologic: Negative.         Physical Exam  HENT:      Head: Normocephalic.     Eyes:      Pupils: Pupils are equal, round, and reactive to light.       Cardiovascular:      Rate and Rhythm: Normal rate and regular rhythm.   Pulmonary:      Effort: Pulmonary effort is normal.   Abdominal:      Tenderness: There is abdominal tenderness.     Skin:     General: Skin is warm.      Coloration: Skin is jaundiced.     Neurological:      General: No focal deficit present.      Mental Status: He is oriented to person, place, and time. Mental status is at baseline.          Last Recorded Vitals  /71 (BP Location: Right arm, Patient Position: Lying)   Pulse 92   Temp 36.4 °C (97.5 °F) (Temporal)   Resp 18   Wt 76.2 kg (168 lb)   SpO2 93%     Relevant Results       Assessment/Plan   Assessment & Plan    Deshawn Foley is a 54 y.o. male presenting with nausea, vomiting, diarrhea in the setting of metastatic pancreatic  adenocarcinoma with multifocal progression.    #Metastatic Pancreatic Adenocarcinoma (stage 4)  ::Primary Oncologist: Dr. Nayak   ::PET CT: 06/12/2025: Showed significant progression of metastatic disease with hypermetabolic lesions in the liver, right adrenal gland, intra abdominal nodes, soft tissue, and appendicular skeleton    - Working towards transitioning to hospice: Goals of care conversation with Dr. Nayak pending  - Supportive oncology consult: Pending recommendations  - Pain Regimen: Tylenol 650 mg q6, Flexeril 5 mg TID PRN, oxycodone IR 20 mg q4 PRN, pregabalin 75 mg 1 tablet in the morning, 1 in the afternoon, and 2 tablets at night    #Nausea, Vomiting  #Hypokalemia, Hypophosphatemia   - Electrolyte abnormalities likely secondary to nausea, vomiting, and diarrhea  - Replete PRN: Mag>2, Phos>3, K>4  -Qtc: 559 so PRN Tigan 200 mg PRN    #Anxiety  - Continue with home ativan 0.5 mg TID    #HLD  - Holding rosuvastatin due to elevated LFTs  - Continue home fenofibrate    F: PRN  E: PRN  N: Regular Diet    Code Status: Full Code  NOK: Nisha Bay (Wife): [phone]       Myrna Oro MD         [1]   Family History  Problem Relation Name Age of Onset    Transient ischemic attack Mother      Heart disease Father      Brain cancer Mother's Brother      Breast cancer Cousin

## 2025-07-23 NOTE — PROGRESS NOTES
Pharmacy Medication History Review    Deshawn Foley is a 54 y.o. male admitted for Vomiting. Pharmacy reviewed the patient's crcto-pz-danlodygd medications and allergies for accuracy.    Medications ADDED:  Fentanyl 100 mcg patch  Lorazepam 1 mg  Medications CHANGED:  Ibuprofen from 400 mg to 200 mg  Oxycodone from 5 mg to 20 mg  Miralax from daily to daily as needed   Medications REMOVED:   Fentanyl 75 mcg patch     The list below reflects the updated PTA list.   Prior to Admission Medications   Prescriptions Last Dose Informant   LORazepam (Ativan) 1 mg tablet  Self   Sig: Take 1 tablet (1 mg) by mouth every 6 hours if needed for anxiety.   acetaminophen (Tylenol) 325 mg tablet  Self   Sig: Take 2 tablets (650 mg) by mouth every 6 hours.   albuterol 90 mcg/actuation inhaler  Self   Sig: Inhale 1 puff 3 times a day as needed.   amLODIPine (Norvasc) 10 mg tablet Not Taking Self   Sig: Take 1 tablet (10 mg) by mouth early in the morning..   Patient not taking: Reported on 7/23/2025   diphenoxylate-atropine (Lomotil) 2.5-0.025 mg tablet  Self   Sig: Take 1 tablet by mouth 4 times a day as needed for diarrhea.   fenofibrate (Triglide) 160 mg tablet Not Taking Self   Sig: Take 1 tablet (160 mg) by mouth once daily.   Patient not taking: Reported on 7/23/2025   fentaNYL (Duragesic) 100 mcg/hr patch  Self   Sig: Place 1 patch on the skin every 3rd day.   fexofenadine (Allegra) 180 mg tablet Not Taking Self   Sig: Take 1 tablet (180 mg) by mouth once daily.   Patient not taking: Reported on 7/23/2025   fluticasone (Flonase) 50 mcg/actuation nasal spray  Self   Sig: Administer 1 spray into each nostril  once daily as needed for allergies or rhinitis.   fluticasone propion-salmeteroL (Advair Diskus) 250-50 mcg/dose diskus inhaler Not Taking Self   Sig: Inhale 2 puffs once daily.   Patient not taking: Reported on 7/23/2025   ibuprofen 200 mg tablet  Self   Sig: Take 2 tablets (400 mg) by mouth every 6 hours if needed for  moderate pain (4 - 6).   loperamide (Imodium A-D) 2 mg capsule  Self   Sig: Take 2 capsules (4 mg) by mouth every 4 hours if needed for diarrhea. As needed diarrhea after each loose stool not to exceed 8 capsules, or 16 mg, in 24 hours, 30 caps,   magnesium hydroxide (Milk of Magnesia) 400 mg/5 mL suspension  Self   Sig: Take 15 mL by mouth once daily as needed for constipation.   ondansetron ODT (Zofran-ODT) 8 mg disintegrating tablet  Self   Sig: Dissolve 1 tablet (8 mg) in the mouth every 8 hours if needed.   oxyCODONE (Roxicodone) 20 mg immediate release tablet  Self   Sig: Take 1 tablet (20 mg) by mouth every 4 hours if needed for severe pain (7 - 10).   polyethylene glycol (Glycolax, Miralax) 17 gram packet  Self   Sig: Take 17 g by mouth once daily as needed (for constipation).   pregabalin (Lyrica) 75 mg capsule  Self   Sig: Take 1 capsule (75 mg) by mouth every 6 hours.   prochlorperazine (Compazine) 10 mg tablet  Self   Sig: Take by mouth every 6 hours if needed for nausea.   Patient not taking: Reported on 7/23/2025   rosuvastatin (Crestor) 40 mg tablet  Self   Sig: Take 1 tablet (40 mg) by mouth once daily.      Facility-Administered Medications: None        The list below reflects the updated allergy list. Please review each documented allergy for additional clarification and justification.  Allergies  Reviewed by Sebas Pablo on 7/23/2025        Severity Reactions Comments    Animal Dander High Other     House Dust Mite High Other     Mold High Other     Pollen Extracts High Other     Wool High Other     Grass Pollen Not Specified Other     House Dust Not Specified Unknown             Patient declines M2B at discharge.     Sources:   [facility]  Pharmacy dispense history  Patient Interview Moderate historian  Chart Review  7/3/25 Office Visit - Hem Onc - [facility]     Additional Comments:  Patient confirmed dosage strength of oxycodone tablet as 20 mg.  Patient confirmed dosage strength of fentanyl patch as  "100 mcg.   Patient confirmed dosage strength of ibuprofen tablet as 200 mg.       JOAN LOUIE  Pharmacy Technician  07/23/25     Secure Chat preferred   If no response call [phone] or [employer] \"Med Rec\"    "

## 2025-07-23 NOTE — SIGNIFICANT EVENT
Senior Resident Staffing Note    Please refer to excellent PGY-1 history and physical note for further details.    HPI:   In brief, Deshawn Foley is a 54 y.o. male with hx of metastatic pancreatic adenoCA w/ liver mets, HLD, anxiety, neuropathy, asthma who presents as direct admit for management of severe N/V/D, and abd pain.     Pt seen at bedside with wife Nisha and sister in law, who also provided additional hx. Patient reportedly had been experiencing severe N/V/D, with inability to tolerate p.o. intake since day prior to admission, and very poor p.o. intake as a result since morning prior.  He has been is not able to take any of his p.o. medications on day prior.     At time of eval denies any further ongoing N/V/D today or difficulty breathing. Notes mm cramping, as well as neuropathic pain in extremities, as well as ongoing pre-existing pain in chest where thoracic mets are located.     Onc history:   Primary Onc: Dr. Nayak    10/5/22--10/19/22: s/p 2 cycles FOLFIRINOX. Discontinued due to intractable nausea/vomiting/diarrhea and colitis    11/16/22 --May 2023: was ib Gemcitabine plus Abraxane day 1 day 15    7/10/23: S/p distal pancreatectomy + placement of Medtronic pump for ABEL. Complicated by pump with bleeding 11/2023.     10/31/24 - started Afatinib & had significant decline in  level.      2/3/25: CT CAP with improvement in tumor burden within liver and lymph nodes, sclerosis of the bones.     5/15/2025: CT showed PD in liver, mediastinum and bone & switched to osimertinib     6/12/25: found to have CA 19-9 more elevated on Osimertinib.    7/3/25: started & completed Cycle 1 of pembrolizumab (21d cycles)       Vitals: T 97.5F, HR 92, RR 18, /71, SpO2 93%    Labs notable for CMP with K 2.9   CBC: WBC  9, Hgb 11.7, plt 330  BMP: Na 134, K 2.9, Cl 96, HCO3 28, BUN 14, Cr 1.0,   LFT: Ca 7.1, tprot 6.3, alb 2.9, alkphos 985, AST 96, ALT 49, tbili 3.6  Electrolytes: PO4 2.3, Mg  2.87    Imaging: N/A    Physical exam:  General: not in acute distress, ill-appearing, resting comfortably in bed  HEENT: normocephalic, atraumatic. PERRL.  Respiratory: CTAB. No crackles or wheeze. Normal WOB on RA.  Chest: mediport in place   Cardiac: RRR, no m/g/r.   Abdomen: Soft, mildly distended, mildly and uniformly tender to palpation. No palpable masses   Extremities: No LE edema observed   Skin: warm, dry. No rashes or wounds noted  Neuro: A&Ox3. Moving extremities equally. No gross focal deficit  Psych: appropriate mood and affect    Assessment and Plan:  Deshawn Foley is a 54 y.o. male with metastatic pancreatic adenoCA w/ liver mets, HLD, anxiety, neuropathy, asthma who is admitted for further management of severe nausea, vomiting, and diarrhea, and inability to tolerate PO intake. Found to also have hypokalemia and hypophosphatemia likely 2/2 to severe acutely worsening  N/V/D and excerbated by poor PO intake.    #Nausea & Vomiting   #Hypokalemia   #Hypophosphatemia   #Prolonged QTc  :: Hypokalemia likely 2/2 intractable N/V, diarrhea PTA   :: EKG on admission with    Plan:   -Regular diet as tolerated   -Will replete electrolytes for K>4, Mg>2, P>3  -Tigan  200mg q6h PRN for nausea. Avoiding Qtc prolonging medications   -Nutrition c/s placed     #Stage IV Pancreatic Adenocarcinoma w/ mets to liver,   :: Primary onc: Dr. Nayak  :: s/p distal pancreatectomy + placement of Medtronic pump for ABEL cb bleeding 11/2023   :: s/p palliative RT to the iliac bone with 25Gy/5fx completed on 6/4/25   Plan:  -GOC & discussion on transition to hospice pending  -Dr. Nayak aware of admission    #Cancer-related pain   #Neuropathy   #Anxiety  :: Followed by [facility]   Plan:  -C/w home pregabalin 75mg 1 tab AM, 1 tab afternoon & 2 tabs   -C/w home ativan 0.5mg TID   -C/w fentanyl patch 100mcg/h patch q72h (current patch placed 7/21)   -Cyclobenzaprine 5mg TID PRN   -Oxycodone 5mg   Supportive Onc  consulted, appreciate recs    #HLD  -C/w home fenofibrate 160mg daily   -Will hold home rosuvastatin given elevated LFTs     #Hx of Asthma   -C/w Breo Ellipta daily   -Albuterol inhaler TID PRN   -Fluticasone 1 spray daily     Diet: Regular   DVT prophylaxis: SubQ heparin   Code status: full code   NOK: Nisha Foley (Spouse) [phone]     Patient and plan to be formally staffed with attending physician in AM.    Jennifer White MD   Internal Medicine PGY-2

## 2025-07-23 NOTE — PROGRESS NOTES
SUPPORTIVE AND PALLIATIVE ONCOLOGY CONSULT      SERVICE DATE: 7/23/2025      ASSESSMENT/PLAN: 54 y.o. male with a history of metastatic pancreatic cancer X20 22 with mets to liver s/p multiple chemotherapy s/p pancreatectomy s/p immunotherapy currently on pembrolizumab admitted with nausea vomiting diarrhea, lack of appetite, pain.  Found to have electrolyte imbalance and elevated LFTs.  PET/CT on 6/12/2025 showed disease progression with lesions in the liver, right adrenal, right intra-abdominal nodes, soft tissue and appendicular skeleton.  Pending goals of care conversation with Dr. Nayak.  Primary team has started the conversation regarding hospice.  Supportive oncology consulted for introduction of service plan symptom management  Significant past medical history-anxiety    Symptom Management Plan: Recommended changes are bolded    Chronic neoplasm related pain secondary to metastatic prostate cancer with mets to liver, right adrenal, right intra-abdominal nodes, soft tissue and appendicular skeleton.  Somatic visceral and neuropathic.  Uncontrolled  PET/CT on 6/12/2025 showed disease progression with lesions in the liver, right adrenal, right intra-abdominal nodes, soft tissue and appendicular skeleton   OARRS/PDMP reviewed no aberrant behavior noted.consistent with  prescriptions/records and patient history   Home regimen: Lyrica 75 mg p.o. every 6 hours, oxycodone IR 20 mg every 4 hours as needed-takes 4 tablets/day, ibuprofen 400 mg p.o. every 6 hours as needed moderate pain, fentanyl 100 mcg/h patch every 72 hours  Intolerances/previously tried: None  Risk factors:  none  Renal function WNL and Hepatic function impaired  Past 24 h opioid requirements (7/22/2025 at 0800 to 7/23/2025 at 0800): Oxycodone IR 20 mg X4 doses= 80 mg oxycodone= 100 mg OME+ Fentanyl 100 mcg/h patch= 200 mg OME  Total OME usage for the past 24 hours: 300 mg OME  Recommend Dilaudid 2 mg IV every 2 hours as needed moderate  pain  Recommend Dilaudid 3 mg IV every 2 hours as needed severe pain  Recommend stopping oxycodone IR 20 mg p.o. every 4 hours as needed due to nausea vomiting.  Can be restarted once nausea vomiting resolves  Recommend dexamethasone 4 mg IV twice daily starting 7/24/2025 at 8 AM and noon as anti-inflammatory agent.  Please ensure patient is on PPI  Continue fentanyl 100 mcg/h patch every 72 hours transdermal  Continue Lyrica 150 mg p.o. nightly and 75 mg p.o. twice daily  Continue Tylenol 650 mg p.o. every 6 hours as needed  Continue Flexeril 5 mg p.o. 3 times daily as needed    Intractable nausea with vomiting ?  Unknown etiology Suboptimally controlled  Home regimen: Zofran ODT 8 mg every 8 hours as needed  QTc:  no recent EKG documented per chart review EKG on admission with QTc 559  Dexamethasone as above will help  Patient on Tigan 200 mg IM every 6 hours as needed    At risk for constipation related to medication side effects (including opioids), currently not constipated  Currently has diarrhea  Usual bowel pattern: every day  Home regimen: MiraLAX 17 g p.o. daily as needed, MOM 15 mL daily as needed  LBM today  Continue milk of mag 15 mL daily as needed  Continue MiraLAX 17 g p.o. daily as needed    Acute on chronic anxiety related to health concerns uncontrolled with home regimen   Home regimen: Ativan 1 mg p.o. every 6 hours as needed  Recommend changing Ativan 0.5 mg 4 times a day scheduled to as needed-Home dose  Once nausea vomiting better controlled will consider adding Cymbalta to help with neuropathic pain and mood    Impaired sleep related to pain and anxietyMay be component of unfamiliar surroundings, and frequent interruptions while inpatient.   Home regimen: Ativan as needed as above  Pain and anxiety management as above  Recommend good sleep hygiene, relaxation techniques (deep breathing techniques, meditation, mindfulness), minimizing interruptions    Appetite loss related to malignancy    Weight loss 7 pounds in 2 weeks  Home regimen:  none  Nutrition consult  Dexamethasone as above will help  Reinforced small frequent meals/snacks. Encouraged increased fluid intake, Encouraged use of supplements  Nausea not currently an issue    Disposition:  Please  start the process of having prior authorization with meds to beds deliver medications to patient prior to discharge via [facility] pharmacy. Prescriptions will need to be sent 48-72 hours prior to discharge so that a prior authorization can be completed.   Discharge date: unknown pending acute issues, pain control, and symptom control  Will assess if patient needs an appointment with Outpatient Supportive Oncology as appropriate    SIGNATURE: Rand Benítez MD  PAGER/CONTACT:  Contact information:  Supportive and Palliative Oncology  Monday-Friday 8 AM-5 PM  Epic Secure chat or pager [phone].  After hours and weekends:  pager [phone]    ==========================================================================================================================    Inpatient consult to [facility] Adult Supportive Oncology  Consult performed by: Rand Benítez MD  Consult ordered by: Duncan Nayak MD      PALLIATIVE MEDICINE OUTPATIENT PROVIDER: Followed by [facility] palliative care  CURRENT ATTENDING PROVIDER: Judson Josue MD     Medical Oncologist: MD Judson Grijalva MD   Radiation Oncologist: No care team member to display  Primary Physician: Theodore Spears  [phone]    REASON FOR CONSULT/CHIEF CONSULT COMPLAINT: pain management, symptom management of nausea vomiting, insomnia, and introduction to Supportive and Palliative Oncology services    Subjective   HISTORY OF PRESENT ILLNESS: Deshawn Foley is a 54 y.o. male with a history of metastatic pancreatic cancer X20 22 with mets to liver s/p multiple chemotherapy s/p pancreatectomy s/p immunotherapy currently on pembrolizumab admitted with nausea vomiting diarrhea, lack of appetite, pain.  Found to have  electrolyte imbalance and elevated LFTs.  PET/CT on 6/12/2025 showed disease progression with lesions in the liver, right adrenal, right intra-abdominal nodes, soft tissue and appendicular skeleton.  Pending goals of care conversation with Dr. Nayak.  Primary team has started the conversation regarding hospice.  Supportive oncology consulted for introduction of service plan symptom management  Significant past medical history-anxiety    Pain Assessment: Complains of bilateral ankle pain intermittent 8/10 radiating up to the thigh, groin and abdomen 8/10 aggravated by movement, along with leg cramps he takes oxycodone IR 20 mg 4 times a day at home which initially used to help however has not been helping recently.  Pain in the legs is affecting her ambulation  Also endorses pain in the chest    Symptom Assessment:  Complaints nausea vomiting and diarrhea X 2 days with significant lack of appetite.  Unable to tolerate solids is able to tolerate liquids.  Has had multiple liquidy bowel movements.  Difficulty sleeping due to pain  Endorses anxiety and depression  Pain:very much   Headache: none  Dizziness:none  Lack of energy: somewhat  Difficulty sleeping: somewhat  Worrying: somewhat  Anxiety: somewhat  Depressive symptoms/low mood: none  Pain in mouth/swallowing: none  Dry mouth: none  Taste changes: none  Shortness of breath: none  Lack of appetite: somewhat   Nausea: somewhat  Vomiting: somewhat  Constipation: none  Diarrhea: somewhat  Sore muscles: none  Numbness or tingling in hands/feet/other: somewhat  Weight loss: 7 pounds in 2 weeks  Other: None      ECOG Performance Status:   [] 0 Fully active, able to carry on all pre-disease performance without restriction  [] 1 Restricted in physically strenuous activity but ambulatory and able to carry out work of a light or sedentary nature, e.g., light house work, office work  [] 2 Ambulatory and capable of all selfcare but unable to carry out any work activities; up  and about more than 50% of waking hours  [x] 3 Capable of only limited selfcare; confined to bed or chair more than 50% of waking hours  [] 4 Completely disabled; cannot carry on any selfcare; totally confined to bed or chair  [] 5 Dead      Information obtained from: chart review, interview of patient, and discussion with primary team  ______________________________________________________________________     Oncology History   Pancreas cancer (Multi)   10/14/2023 Initial Diagnosis    Pancreas cancer (CMS/HCC)     1/13/2024 - 1/13/2024 Chemotherapy    Hepatic Arterial Infusion Heparin Saline     9/26/2024 - 9/26/2024 Chemotherapy    Afatinib, 28 Day Cycles     5/19/2025 - 6/16/2025 Chemotherapy    Osimertinib, 28 Day Cycles     7/7/2025 -  Chemotherapy    Pembrolizumab, 21 Day Cycles         Medical History[1]  Surgical History[2]  Family History[3]     SOCIAL HISTORY:  Social History:  reports that he has never smoked. He has been exposed to tobacco smoke. He has never used smokeless tobacco. He reports that he does not currently use alcohol. He reports that he does not currently use drugs.    REVIEW OF SYSTEMS:  Review of systems negative unless noted in HPI.       Objective       Lab Results   Component Value Date    WBC 9.0 07/23/2025    HGB 11.7 (L) 07/23/2025    HCT 33.2 (L) 07/23/2025    MCV 85 07/23/2025     07/23/2025      Lab Results   Component Value Date    GLUCOSE 149 (H) 07/23/2025    CALCIUM 7.1 (L) 07/23/2025     (L) 07/23/2025    K 2.9 (LL) 07/23/2025    CO2 28 07/23/2025    CL 96 (L) 07/23/2025    BUN 14 07/23/2025    CREATININE 1.00 07/23/2025     Lab Results   Component Value Date    ALT 49 07/23/2025    AST 96 (H) 07/23/2025    ALKPHOS 985 (H) 07/23/2025    BILITOT 3.6 (H) 07/23/2025     Estimated Creatinine Clearance: 81.7 mL/min (by C-G formula based on SCr of 1 mg/dL).     No orders to display         No results found for this or any previous visit (from the past 4464  hours).  Wt Readings from Last 5 Encounters:   07/23/25 76.2 kg (168 lb)   07/23/25 79.4 kg (175 lb)   07/18/25 77.9 kg (171 lb 10.1 oz)   07/10/25 79 kg (174 lb 1.6 oz)   07/07/25 79.7 kg (175 lb 11.3 oz)       Current Outpatient Medications   Medication Instructions    acetaminophen (TYLENOL) 650 mg, oral, Every 6 hours    albuterol 90 mcg/actuation inhaler 1 puff, inhalation, 3 times daily PRN    amLODIPine (Norvasc) 10 mg tablet 1 tablet, Daily (0630)    diphenoxylate-atropine (Lomotil) 2.5-0.025 mg tablet 1 tablet, oral, 4 times daily PRN    fenofibrate (Triglide) 160 mg tablet 1 tablet, Daily    fentaNYL (Duragesic) 100 mcg/hr patch 1 patch, transdermal, Every 72 hours    fexofenadine (Allegra) 180 mg tablet 1 tablet, Daily    fluticasone (Flonase) 50 mcg/actuation nasal spray 1 spray, Each Nostril, Daily PRN    fluticasone propion-salmeteroL (Advair Diskus) 250-50 mcg/dose diskus inhaler 2 puffs, Daily    ibuprofen 400 mg, oral, Every 6 hours PRN    loperamide (Imodium A-D) 2 mg capsule 2 capsules, oral, Every 4 hours PRN, As needed diarrhea after each loose stool not to exceed 8 capsules, or 16 mg, in 24 hours, 30 caps,    LORazepam (ATIVAN) 1 mg, oral, Every 6 hours PRN    magnesium hydroxide (Milk of Magnesia) 400 mg/5 mL suspension 15 mL, oral, Daily PRN    ondansetron ODT (ZOFRAN-ODT) 8 mg, oral, Every 8 hours PRN    oxyCODONE (ROXICODONE) 20 mg, oral, Every 4 hours PRN    polyethylene glycol (GLYCOLAX, MIRALAX) 17 g, oral, Daily PRN    pregabalin (Lyrica) 75 mg capsule 1 capsule, oral, Every 6 hours    prochlorperazine (Compazine) 10 mg tablet Every 6 hours PRN    rosuvastatin (Crestor) 40 mg tablet 1 tablet, oral, Daily     Scheduled medications   Scheduled Medications[4]  Continuous medications  Continuous Medications[5]  PRN medications  acetaminophen, 650 mg, q6h PRN  albuterol, 1 puff, TID PRN  alteplase, 2 mg, PRN  cyclobenzaprine, 5 mg, TID PRN  magnesium hydroxide, 15 mL, Daily PRN  oxyCODONE,  20 mg, q4h PRN  polyethylene glycol, 17 g, Daily PRN  trimethobenzamide, 200 mg, q6h PRN         Allergies: RX Allergies[6]             PHYSICAL EXAMINATION:  Vital Signs:   Vital signs reviewed  Vitals:    07/23/25 1900   BP: 112/75   Pulse: 80   Resp: 18   Temp: 36.5 °C (97.7 °F)   SpO2: 96%     Pain Score: 8     Physical Exam  Vitals reviewed.   Constitutional:       General: He is not in acute distress.     Appearance: He is ill-appearing.   HENT:      Head: Normocephalic and atraumatic.     Eyes:      Extraocular Movements: Extraocular movements intact.      Conjunctiva/sclera: Conjunctivae normal.      Pupils: Pupils are equal, round, and reactive to light.       Cardiovascular:      Rate and Rhythm: Regular rhythm. Tachycardia present.      Heart sounds: Normal heart sounds.   Pulmonary:      Effort: Pulmonary effort is normal. No respiratory distress.   Abdominal:      General: Bowel sounds are normal. There is no distension.      Palpations: Abdomen is soft.      Tenderness: There is abdominal tenderness.     Musculoskeletal:         General: Normal range of motion.     Skin:     General: Skin is warm and dry.     Neurological:      General: No focal deficit present.      Mental Status: He is alert and oriented to person, place, and time.      Motor: Weakness present.     Psychiatric:         Behavior: Behavior normal.       ==========================================================================================================================    PALLIATIVE CARE ENCOUNTER:    Introduction to Supportive and Palliative Oncology:  Spoke with patient at bedside  Introduced the role and philosophy of Supportive and Palliative oncology in the evaluation and management of symptoms during cancer treatment  Palliative care was introduced as a service for patients with serious illness to help with symptoms, assist with goals of care conversations, navigate complex decision making, improve quality of life for  "patients, and provide support both patients and families.    Supportive and Palliative Oncology encounter:  Emotional support provided  Coordination of care:  medication changes    Medical Decision Making/Goals of Care/Advance Care Planning:  The patient and/or family consented to a voluntary Advance Care Planning conversation. Individuals present for the conversation: pt    Summary of the conversation: Patient's current clinical condition, including diagnosis, prognosis, and management plan, and goals of care were discussed.   Life limiting disease: metastatic malignancy  Family: .  Lives with wife  Performance status: Moderate limitations due to pain independent with ADLs however needs assistance with IADLs  Joys/meaning/strength: Family  Understanding of health: Understands that he has pancreatic cancer with new mets to spine and plan for radiation to the chest and he was admitted because of nausea vomiting  Information:Wants full disclosure  Goals: States\" my goals do not hold good anymore since I might die soon\"  Worries and fears now and future: death   Code status discussion: Patient preferred to have this conversation tomorrow in the presence of wife    Advance Directives  Existence of Advance Directives:None  Decision maker: Surrogate decision maker is wife, Nisha Foley   Code Status: Full code    Outcome of the conversation and documents completed: Plan to have advance care planning conversation tomorrow in the presence of wife    Supportive Interventions: Interventions: Music Therapy: referral placed, Art Therapy: referral placed, [facility] Spiritual Care: referral placed, Social work referral for: Psychosocial support, Integrative Oncology referral placed    =============================================  SIGNATURE AND BILLING:  High Complexity   Today, I am treating the patient for acute on chronic pain, nausea vomiting, anxiety, insomnia, lack of appetite which is in severe exacerbation,   Extensive " DATA   Reviewed records from the following unique sources:  providers from oncology services  Diagnostic tests and information reviewed for today's visit:  Most recent labs and imaging results, Most recent EKG, Medications  Independently interpreted test CBC, CMP which shows anemia, hyperglycemia, hypocalcemia, hyponatremia, hypokalemia, elevated LFTs.PET/CT on 6/12/2025 showed disease progression with lesions in the liver, right adrenal, right intra-abdominal nodes, soft tissue and appendicular skeleton   Discussed plan of Care/management of pain, anxiety, nausea, anorexia with: Provider via shared electronic medical record/secure chat/email or face-to-face.  High risk of morbidity from additional diagnostic testing or treatment  The patient is on parenteral controlled substances: Dilaudid IV as needed as above    Thank you for asking Supportive and Palliative Oncology to assist with care of this patient.Recommendations will be communicated back to the consulting service by way of shared electronic medical record/secure chat/email or face-to-face. We will continue to follow. Please contact us for additional questions or concerns.    Medical complexity was high level due to due to complexity of problems, extensive data review, and high risk of management/treatment.   I spent 30 minutes providing separately identifiable ACP services/goals of care discussion with the patient and/or surrogate decision maker in a voluntary, in-person conversation discussing the patient's wishes and goals (discussing pt's beliefs, values and goals/wishes for aggressive medical care, desire for hospice vs palliative care), counseling including explainaition as detailed in the above note.    SIGNATURE: Rand Benítez MD   PAGER/CONTACT:  Contact information:  Supportive and Palliative Oncology  Monday-Friday 8 AM-5 PM  Epic Secure chat or pager [phone].  After hours and weekends:  pager [phone]           [1]   Past Medical History:  Diagnosis  Date    Asthma     Diabetes mellitus (Multi)     GERD (gastroesophageal reflux disease)     Metastatic cancer (Multi)     Pancreatic cancer (Multi)    [2]   Past Surgical History:  Procedure Laterality Date    CHOLECYSTECTOMY      FL GUIDED TRANSVAGINAL TRANSRECTAL FLUID DRAIN  09/13/2023    FL GUIDED TRANSVAGINAL TRANSRECTAL FLUID DRAIN 9/13/2023    IR ANGIOGRAM INFERIOR EPIGASTRIC  08/28/2023    IR ANGIOGRAM INFERIOR EPIGASTRIC 8/28/2023 [facility] ANGIO    IR BODY DRAIN EXCHANGE  10/09/2023    IR BODY DRAIN EXCHANGE 10/9/2023    LYMPH NODE DISSECTION      NM HEPATIC ARTERY PUMP FLOW      ABEL pump  and then removed.    PANCREAS SURGERY      pancreas tail    SPLENECTOMY, TOTAL     [3]   Family History  Problem Relation Name Age of Onset    Transient ischemic attack Mother      Heart disease Father      Brain cancer Mother's Brother      Breast cancer Cousin     [4] [START ON 7/24/2025] enoxaparin, 40 mg, subcutaneous, Daily  fenofibrate, 160 mg, oral, Daily  [START ON 7/24/2025] fentaNYL, 1 patch, transdermal, q72h  fluticasone, 1 spray, Each Nostril, Daily  fluticasone furoate-vilanteroL, 1 puff, inhalation, Daily  heparin (porcine), 5,000 Units, subcutaneous, q8h CHELE  LORazepam, 0.5 mg, oral, 4x daily  potassium phosphate, 15 mmol, intravenous, Once  pregabalin, 150 mg, oral, Nightly  pregabalin, 75 mg, oral, BID  [5]    [6]   Allergies  Allergen Reactions    Animal Dander Other    House Dust Mite Other    Mold Other    Pollen Extracts Other    Wool Other    Grass Pollen Other    House Dust Unknown

## 2025-07-24 ENCOUNTER — APPOINTMENT (OUTPATIENT)
Dept: HEMATOLOGY/ONCOLOGY | Facility: HOSPITAL | Age: 55
End: 2025-07-24
Payer: MEDICARE

## 2025-07-24 LAB
ALBUMIN SERPL BCP-MCNC: 2.7 G/DL (ref 3.4–5)
ALBUMIN SERPL BCP-MCNC: 2.9 G/DL (ref 3.4–5)
ALP SERPL-CCNC: 957 U/L (ref 33–120)
ALT SERPL W P-5'-P-CCNC: 49 U/L (ref 10–52)
ANION GAP SERPL CALC-SCNC: 11 MMOL/L (ref 10–20)
ANION GAP SERPL CALC-SCNC: 14 MMOL/L (ref 10–20)
AST SERPL W P-5'-P-CCNC: 95 U/L (ref 9–39)
ATRIAL RATE: 93 BPM
BASOPHILS # BLD AUTO: 0.03 X10*3/UL (ref 0–0.1)
BASOPHILS NFR BLD AUTO: 0.3 %
BILIRUB SERPL-MCNC: 3.8 MG/DL (ref 0–1.2)
BUN SERPL-MCNC: 17 MG/DL (ref 6–23)
BUN SERPL-MCNC: 21 MG/DL (ref 6–23)
CALCIUM SERPL-MCNC: 7.1 MG/DL (ref 8.6–10.6)
CALCIUM SERPL-MCNC: 7.2 MG/DL (ref 8.6–10.6)
CHLORIDE SERPL-SCNC: 97 MMOL/L (ref 98–107)
CHLORIDE SERPL-SCNC: 98 MMOL/L (ref 98–107)
CO2 SERPL-SCNC: 26 MMOL/L (ref 21–32)
CO2 SERPL-SCNC: 29 MMOL/L (ref 21–32)
CREAT SERPL-MCNC: 0.93 MG/DL (ref 0.5–1.3)
CREAT SERPL-MCNC: 1.03 MG/DL (ref 0.5–1.3)
EGFRCR SERPLBLD CKD-EPI 2021: 86 ML/MIN/1.73M*2
EGFRCR SERPLBLD CKD-EPI 2021: >90 ML/MIN/1.73M*2
EOSINOPHIL # BLD AUTO: 0.24 X10*3/UL (ref 0–0.7)
EOSINOPHIL NFR BLD AUTO: 2.2 %
ERYTHROCYTE [DISTWIDTH] IN BLOOD BY AUTOMATED COUNT: 16.1 % (ref 11.5–14.5)
GLUCOSE SERPL-MCNC: 123 MG/DL (ref 74–99)
GLUCOSE SERPL-MCNC: 97 MG/DL (ref 74–99)
HCT VFR BLD AUTO: 32.4 % (ref 41–52)
HGB BLD-MCNC: 11.1 G/DL (ref 13.5–17.5)
IMM GRANULOCYTES # BLD AUTO: 0.07 X10*3/UL (ref 0–0.7)
IMM GRANULOCYTES NFR BLD AUTO: 0.6 % (ref 0–0.9)
LYMPHOCYTES # BLD AUTO: 1.48 X10*3/UL (ref 1.2–4.8)
LYMPHOCYTES NFR BLD AUTO: 13.5 %
MAGNESIUM SERPL-MCNC: 2.81 MG/DL (ref 1.6–2.4)
MCH RBC QN AUTO: 29.2 PG (ref 26–34)
MCHC RBC AUTO-ENTMCNC: 34.3 G/DL (ref 32–36)
MCV RBC AUTO: 85 FL (ref 80–100)
MONOCYTES # BLD AUTO: 1.03 X10*3/UL (ref 0.1–1)
MONOCYTES NFR BLD AUTO: 9.4 %
NEUTROPHILS # BLD AUTO: 8.12 X10*3/UL (ref 1.2–7.7)
NEUTROPHILS NFR BLD AUTO: 74 %
NRBC BLD-RTO: 0.2 /100 WBCS (ref 0–0)
P AXIS: 65 DEGREES
P OFFSET: 189 MS
P ONSET: 158 MS
PHOSPHATE SERPL-MCNC: 1.6 MG/DL (ref 2.5–4.9)
PHOSPHATE SERPL-MCNC: 2.5 MG/DL (ref 2.5–4.9)
PLATELET # BLD AUTO: 318 X10*3/UL (ref 150–450)
POTASSIUM SERPL-SCNC: 3.2 MMOL/L (ref 3.5–5.3)
POTASSIUM SERPL-SCNC: 3.6 MMOL/L (ref 3.5–5.3)
PR INTERVAL: 140 MS
PROT SERPL-MCNC: 6.3 G/DL (ref 6.4–8.2)
Q ONSET: 228 MS
QRS COUNT: 16 BEATS
QRS DURATION: 70 MS
QT INTERVAL: 450 MS
QTC CALCULATION(BAZETT): 559 MS
QTC FREDERICIA: 521 MS
R AXIS: -11 DEGREES
RBC # BLD AUTO: 3.8 X10*6/UL (ref 4.5–5.9)
SODIUM SERPL-SCNC: 134 MMOL/L (ref 136–145)
SODIUM SERPL-SCNC: 134 MMOL/L (ref 136–145)
T AXIS: 29 DEGREES
T OFFSET: 453 MS
VENTRICULAR RATE: 93 BPM
WBC # BLD AUTO: 11 X10*3/UL (ref 4.4–11.3)

## 2025-07-24 PROCEDURE — 99221 1ST HOSP IP/OBS SF/LOW 40: CPT

## 2025-07-24 PROCEDURE — 2500000004 HC RX 250 GENERAL PHARMACY W/ HCPCS (ALT 636 FOR OP/ED)

## 2025-07-24 PROCEDURE — 2500000001 HC RX 250 WO HCPCS SELF ADMINISTERED DRUGS (ALT 637 FOR MEDICARE OP)

## 2025-07-24 PROCEDURE — 84100 ASSAY OF PHOSPHORUS: CPT

## 2025-07-24 PROCEDURE — 2500000005 HC RX 250 GENERAL PHARMACY W/O HCPCS

## 2025-07-24 PROCEDURE — 85025 COMPLETE CBC W/AUTO DIFF WBC: CPT

## 2025-07-24 PROCEDURE — 99232 SBSQ HOSP IP/OBS MODERATE 35: CPT

## 2025-07-24 PROCEDURE — 1170000001 HC PRIVATE ONCOLOGY ROOM DAILY

## 2025-07-24 PROCEDURE — 84075 ASSAY ALKALINE PHOSPHATASE: CPT

## 2025-07-24 PROCEDURE — 83735 ASSAY OF MAGNESIUM: CPT

## 2025-07-24 PROCEDURE — G2212 PROLONG OUTPT/OFFICE VIS: HCPCS

## 2025-07-24 PROCEDURE — 99233 SBSQ HOSP IP/OBS HIGH 50: CPT

## 2025-07-24 RX ORDER — PANTOPRAZOLE SODIUM 40 MG/1
40 TABLET, DELAYED RELEASE ORAL
Status: DISCONTINUED | OUTPATIENT
Start: 2025-07-24 | End: 2025-07-26 | Stop reason: HOSPADM

## 2025-07-24 RX ORDER — PANTOPRAZOLE SODIUM 40 MG/10ML
40 INJECTION, POWDER, LYOPHILIZED, FOR SOLUTION INTRAVENOUS
Status: DISCONTINUED | OUTPATIENT
Start: 2025-07-24 | End: 2025-07-26 | Stop reason: HOSPADM

## 2025-07-24 RX ORDER — POLYETHYLENE GLYCOL 3350 17 G/17G
17 POWDER, FOR SOLUTION ORAL DAILY
Status: DISCONTINUED | OUTPATIENT
Start: 2025-07-24 | End: 2025-07-26 | Stop reason: HOSPADM

## 2025-07-24 RX ORDER — OXYCODONE HYDROCHLORIDE 10 MG/1
10 TABLET ORAL
Refills: 0 | Status: DISCONTINUED | OUTPATIENT
Start: 2025-07-24 | End: 2025-07-24

## 2025-07-24 RX ORDER — DULOXETIN HYDROCHLORIDE 30 MG/1
30 CAPSULE, DELAYED RELEASE ORAL DAILY
Status: DISCONTINUED | OUTPATIENT
Start: 2025-07-25 | End: 2025-07-25

## 2025-07-24 RX ORDER — HYDROMORPHONE HYDROCHLORIDE 1 MG/ML
0.6 INJECTION, SOLUTION INTRAMUSCULAR; INTRAVENOUS; SUBCUTANEOUS ONCE
Status: COMPLETED | OUTPATIENT
Start: 2025-07-24 | End: 2025-07-24

## 2025-07-24 RX ORDER — LORAZEPAM 0.5 MG/1
0.5 TABLET ORAL 4 TIMES DAILY PRN
Status: DISCONTINUED | OUTPATIENT
Start: 2025-07-24 | End: 2025-07-26 | Stop reason: HOSPADM

## 2025-07-24 RX ORDER — OXYCODONE HYDROCHLORIDE 10 MG/1
20 TABLET ORAL
Refills: 0 | Status: DISCONTINUED | OUTPATIENT
Start: 2025-07-24 | End: 2025-07-24

## 2025-07-24 RX ORDER — OXYCODONE HYDROCHLORIDE 10 MG/1
30 TABLET ORAL
Refills: 0 | Status: DISCONTINUED | OUTPATIENT
Start: 2025-07-24 | End: 2025-07-25

## 2025-07-24 RX ORDER — DOCUSATE SODIUM 100 MG/1
100 CAPSULE, LIQUID FILLED ORAL 2 TIMES DAILY PRN
Status: DISCONTINUED | OUTPATIENT
Start: 2025-07-24 | End: 2025-07-26 | Stop reason: HOSPADM

## 2025-07-24 RX ORDER — NALOXONE HYDROCHLORIDE 0.4 MG/ML
0.2 INJECTION, SOLUTION INTRAMUSCULAR; INTRAVENOUS; SUBCUTANEOUS EVERY 5 MIN PRN
Status: DISCONTINUED | OUTPATIENT
Start: 2025-07-24 | End: 2025-07-26 | Stop reason: HOSPADM

## 2025-07-24 RX ORDER — OXYCODONE HYDROCHLORIDE 10 MG/1
20 TABLET ORAL
Refills: 0 | Status: DISCONTINUED | OUTPATIENT
Start: 2025-07-24 | End: 2025-07-25

## 2025-07-24 RX ORDER — POTASSIUM CHLORIDE 14.9 MG/ML
20 INJECTION INTRAVENOUS
Status: COMPLETED | OUTPATIENT
Start: 2025-07-24 | End: 2025-07-24

## 2025-07-24 RX ORDER — DULOXETIN HYDROCHLORIDE 20 MG/1
20 CAPSULE, DELAYED RELEASE ORAL DAILY
Status: DISCONTINUED | OUTPATIENT
Start: 2025-07-24 | End: 2025-07-24

## 2025-07-24 RX ADMIN — DULOXETINE 20 MG: 20 CAPSULE, DELAYED RELEASE ORAL at 11:33

## 2025-07-24 RX ADMIN — CYCLOBENZAPRINE 5 MG: 10 TABLET, FILM COATED ORAL at 22:41

## 2025-07-24 RX ADMIN — OXYCODONE HYDROCHLORIDE 20 MG: 10 TABLET ORAL at 11:55

## 2025-07-24 RX ADMIN — HYDROMORPHONE HYDROCHLORIDE 2 MG: 2 INJECTION, SOLUTION INTRAMUSCULAR; INTRAVENOUS; SUBCUTANEOUS at 16:30

## 2025-07-24 RX ADMIN — CYCLOBENZAPRINE 5 MG: 10 TABLET, FILM COATED ORAL at 06:30

## 2025-07-24 RX ADMIN — POTASSIUM CHLORIDE 20 MEQ: 14.9 INJECTION, SOLUTION INTRAVENOUS at 04:26

## 2025-07-24 RX ADMIN — PREGABALIN 75 MG: 75 CAPSULE ORAL at 08:24

## 2025-07-24 RX ADMIN — HYDROMORPHONE HYDROCHLORIDE 2 MG: 2 INJECTION, SOLUTION INTRAMUSCULAR; INTRAVENOUS; SUBCUTANEOUS at 22:46

## 2025-07-24 RX ADMIN — SODIUM PHOSPHATE, MONOBASIC, MONOHYDRATE AND SODIUM PHOSPHATE, DIBASIC, ANHYDROUS 21 MMOL: 142; 276 INJECTION, SOLUTION INTRAVENOUS at 10:49

## 2025-07-24 RX ADMIN — OXYCODONE HYDROCHLORIDE 30 MG: 10 TABLET ORAL at 15:16

## 2025-07-24 RX ADMIN — OXYCODONE HYDROCHLORIDE 30 MG: 10 TABLET ORAL at 18:20

## 2025-07-24 RX ADMIN — PANTOPRAZOLE SODIUM 40 MG: 40 TABLET, DELAYED RELEASE ORAL at 08:24

## 2025-07-24 RX ADMIN — HYDROMORPHONE HYDROCHLORIDE 2 MG: 2 INJECTION, SOLUTION INTRAMUSCULAR; INTRAVENOUS; SUBCUTANEOUS at 19:39

## 2025-07-24 RX ADMIN — POTASSIUM PHOSPHATE 21 MMOL: 236; 224 INJECTION, SOLUTION INTRAVENOUS at 16:30

## 2025-07-24 RX ADMIN — OXYCODONE HYDROCHLORIDE 10 MG: 10 TABLET ORAL at 08:52

## 2025-07-24 RX ADMIN — FENTANYL 1 PATCH: 100 PATCH TRANSDERMAL at 08:24

## 2025-07-24 RX ADMIN — DEXAMETHASONE SODIUM PHOSPHATE 4 MG: 4 INJECTION, SOLUTION INTRA-ARTICULAR; INTRALESIONAL; INTRAMUSCULAR; INTRAVENOUS; SOFT TISSUE at 12:01

## 2025-07-24 RX ADMIN — HYDROMORPHONE HYDROCHLORIDE 2 MG: 2 INJECTION, SOLUTION INTRAMUSCULAR; INTRAVENOUS; SUBCUTANEOUS at 10:54

## 2025-07-24 RX ADMIN — POTASSIUM CHLORIDE 20 MEQ: 14.9 INJECTION, SOLUTION INTRAVENOUS at 02:37

## 2025-07-24 RX ADMIN — OXYCODONE HYDROCHLORIDE 20 MG: 10 TABLET ORAL at 21:43

## 2025-07-24 RX ADMIN — PREGABALIN 150 MG: 75 CAPSULE ORAL at 19:47

## 2025-07-24 RX ADMIN — FLUTICASONE FUROATE AND VILANTEROL TRIFENATATE 1 PUFF: 200; 25 POWDER RESPIRATORY (INHALATION) at 08:23

## 2025-07-24 RX ADMIN — HYDROMORPHONE HYDROCHLORIDE 0.6 MG: 1 INJECTION, SOLUTION INTRAMUSCULAR; INTRAVENOUS; SUBCUTANEOUS at 08:24

## 2025-07-24 RX ADMIN — HYDROMORPHONE HYDROCHLORIDE 2 MG: 2 INJECTION, SOLUTION INTRAMUSCULAR; INTRAVENOUS; SUBCUTANEOUS at 13:05

## 2025-07-24 RX ADMIN — FENOFIBRATE 160 MG: 160 TABLET ORAL at 08:23

## 2025-07-24 RX ADMIN — DEXAMETHASONE SODIUM PHOSPHATE 4 MG: 4 INJECTION, SOLUTION INTRA-ARTICULAR; INTRALESIONAL; INTRAMUSCULAR; INTRAVENOUS; SOFT TISSUE at 08:24

## 2025-07-24 RX ADMIN — LORAZEPAM 0.5 MG: 0.5 TABLET ORAL at 06:30

## 2025-07-24 RX ADMIN — OXYCODONE HYDROCHLORIDE 20 MG: 10 TABLET ORAL at 04:23

## 2025-07-24 RX ADMIN — PREGABALIN 75 MG: 75 CAPSULE ORAL at 16:30

## 2025-07-24 ASSESSMENT — COGNITIVE AND FUNCTIONAL STATUS - GENERAL
STANDING UP FROM CHAIR USING ARMS: A LITTLE
TOILETING: A LITTLE
DAILY ACTIVITIY SCORE: 18
CLIMB 3 TO 5 STEPS WITH RAILING: A LITTLE
DRESSING REGULAR UPPER BODY CLOTHING: A LITTLE
EATING MEALS: A LITTLE
TURNING FROM BACK TO SIDE WHILE IN FLAT BAD: A LITTLE
STANDING UP FROM CHAIR USING ARMS: A LITTLE
MOVING FROM LYING ON BACK TO SITTING ON SIDE OF FLAT BED WITH BEDRAILS: A LITTLE
HELP NEEDED FOR BATHING: A LITTLE
MOBILITY SCORE: 19
TURNING FROM BACK TO SIDE WHILE IN FLAT BAD: A LITTLE
MOBILITY SCORE: 18
MOVING TO AND FROM BED TO CHAIR: A LITTLE
HELP NEEDED FOR BATHING: A LITTLE
DRESSING REGULAR LOWER BODY CLOTHING: A LITTLE
DAILY ACTIVITIY SCORE: 20
CLIMB 3 TO 5 STEPS WITH RAILING: A LITTLE
PERSONAL GROOMING: A LITTLE
WALKING IN HOSPITAL ROOM: A LITTLE
DRESSING REGULAR LOWER BODY CLOTHING: A LITTLE
WALKING IN HOSPITAL ROOM: A LITTLE
MOVING TO AND FROM BED TO CHAIR: A LITTLE
TOILETING: A LITTLE
DRESSING REGULAR UPPER BODY CLOTHING: A LITTLE

## 2025-07-24 ASSESSMENT — PAIN - FUNCTIONAL ASSESSMENT
PAIN_FUNCTIONAL_ASSESSMENT: 0-10

## 2025-07-24 ASSESSMENT — PAIN SCALES - GENERAL
PAINLEVEL_OUTOF10: 8
PAINLEVEL_OUTOF10: 8
PAINLEVEL_OUTOF10: 7
PAINLEVEL_OUTOF10: 8
PAINLEVEL_OUTOF10: 5 - MODERATE PAIN
PAINLEVEL_OUTOF10: 8

## 2025-07-24 ASSESSMENT — ENCOUNTER SYMPTOMS
SLEEP DISTURBANCE: 1
DYSPHORIC MOOD: 1
NERVOUS/ANXIOUS: 1
BACK PAIN: 1
CHEST TIGHTNESS: 1
ABDOMINAL PAIN: 1
NAUSEA: 1

## 2025-07-24 NOTE — CARE PLAN
Problem: Pain - Adult  Goal: Verbalizes/displays adequate comfort level or baseline comfort level  Outcome: Progressing     Problem: Safety - Adult  Goal: Free from fall injury  Outcome: Progressing     Problem: Nutrition  Goal: Nutrient intake appropriate for maintaining nutritional needs  Outcome: Progressing     Problem: Chronic Conditions and Co-morbidities  Goal: Patient's chronic conditions and co-morbidity symptoms are monitored and maintained or improved  Outcome: Progressing       The clinical goals for the shift include pain mgmt during shift

## 2025-07-24 NOTE — CONSULTS
Inpatient consult to Integrative Hem/Onc  Consult performed by: Anayeli Velez, MARIBELL-CNP  Consult ordered by: Rand Benítez MD          Reason For Consult  Symptom management    History Of Present Illness  Deshawn Foley is a 54 y.o. male with PMH of metastatic pancreatic adenocarcinoma (with mets), HLD, anxiety, and asthma who is presenting with nausea, vomiting, diarrhea in the setting of metastatic pancreatic adenocarcinoma with multifocal progression. Integrative hematology/oncology consulted by supportive oncology for non-pharmacological symptom management of pain.    Pt sitting in bed, pt's family members at bedside.    Family Hx: Father: heart disease. Mother: dementia. Aunt: breast CA. Uncle: brain CA     Social Hx: live sin a home with wife and dog. No children. Previously worked for the [employer]   Tobacco: denies   ETOH: denies   Illicits: denies      Spiritual Hx: Gnosticist     Joys: flying remote airplanes, kayaking, hiking, shooting, outdoors, music - country and classic rock        Information obtained from chart review, discussion with patient/family, and discussion with primary team.       Past Medical History  He has a past medical history of Asthma, Diabetes mellitus (Multi), GERD (gastroesophageal reflux disease), Metastatic cancer (Multi), and Pancreatic cancer (Multi).    He has no past medical history of Chronic kidney disease.    Surgical History  He has a past surgical history that includes IR angiogram inferior epigastric (08/28/2023); IR body drain exchange (10/09/2023); FL guided transvaginal transrectal fluid drain (09/13/2023); Cholecystectomy; Pancreas surgery; Splenectomy, total; Lymph node dissection; and NM hepatic artery pump flow.     Social History  He reports that he has never smoked. He has been exposed to tobacco smoke. He has never used smokeless tobacco. He reports that he does not currently use alcohol. He reports that he does not currently use drugs.    Family  "History  Family History[1]     Allergies  Animal dander, House dust mite, Mold, Pollen extracts, Wool, Grass pollen, and House dust    Review of Systems   Respiratory:  Positive for chest tightness.    Gastrointestinal:  Positive for abdominal pain and nausea.   Musculoskeletal:  Positive for back pain.   Psychiatric/Behavioral:  Positive for dysphoric mood and sleep disturbance. The patient is nervous/anxious.         Physical Exam  Vitals and nursing note reviewed. Exam conducted with a chaperone present.   Constitutional:       General: He is not in acute distress.     Appearance: Normal appearance. He is normal weight. He is ill-appearing.   HENT:      Head: Normocephalic and atraumatic.      Nose: Nose normal.      Mouth/Throat:      Mouth: Mucous membranes are moist.     Eyes:      Extraocular Movements: Extraocular movements intact.      Pupils: Pupils are equal, round, and reactive to light.       Cardiovascular:      Rate and Rhythm: Normal rate.   Pulmonary:      Effort: Pulmonary effort is normal.   Abdominal:      General: Abdomen is flat. Bowel sounds are normal.     Skin:     General: Skin is warm and dry.     Neurological:      General: No focal deficit present.      Mental Status: He is alert and oriented to person, place, and time. Mental status is at baseline.     Psychiatric:         Mood and Affect: Mood normal.         Behavior: Behavior normal.         Thought Content: Thought content normal.         Judgment: Judgment normal.          Last Recorded Vitals  Blood pressure 119/83, pulse 96, temperature 36.1 °C (97 °F), temperature source Temporal, resp. rate 18, height 1.727 m (5' 8\"), weight 76.2 kg (168 lb), SpO2 96%.    Relevant Results  Scheduled medications  Scheduled Medications[2]  Continuous medications  Continuous Medications[3]  PRN medications  PRN Medications[4]    Results for orders placed or performed during the hospital encounter of 07/23/25 (from the past 24 hours)   CBC and Auto " Differential   Result Value Ref Range    WBC 9.0 4.4 - 11.3 x10*3/uL    nRBC 0.2 (H) 0.0 - 0.0 /100 WBCs    RBC 3.90 (L) 4.50 - 5.90 x10*6/uL    Hemoglobin 11.7 (L) 13.5 - 17.5 g/dL    Hematocrit 33.2 (L) 41.0 - 52.0 %    MCV 85 80 - 100 fL    MCH 30.0 26.0 - 34.0 pg    MCHC 35.2 32.0 - 36.0 g/dL    RDW 15.9 (H) 11.5 - 14.5 %    Platelets 330 150 - 450 x10*3/uL    Immature Granulocytes %, Automated 0.6 0.0 - 0.9 %    Immature Granulocytes Absolute, Automated 0.05 0.00 - 0.70 x10*3/uL   Comprehensive metabolic panel   Result Value Ref Range    Glucose 149 (H) 74 - 99 mg/dL    Sodium 134 (L) 136 - 145 mmol/L    Potassium 2.9 (LL) 3.5 - 5.3 mmol/L    Chloride 96 (L) 98 - 107 mmol/L    Bicarbonate 28 21 - 32 mmol/L    Anion Gap 13 10 - 20 mmol/L    Urea Nitrogen 14 6 - 23 mg/dL    Creatinine 1.00 0.50 - 1.30 mg/dL    eGFR 89 >60 mL/min/1.73m*2    Calcium 7.1 (L) 8.6 - 10.6 mg/dL    Albumin 2.9 (L) 3.4 - 5.0 g/dL    Alkaline Phosphatase 985 (H) 33 - 120 U/L    Total Protein 6.3 (L) 6.4 - 8.2 g/dL    AST 96 (H) 9 - 39 U/L    Bilirubin, Total 3.6 (H) 0.0 - 1.2 mg/dL    ALT 49 10 - 52 U/L   Magnesium   Result Value Ref Range    Magnesium 2.87 (H) 1.60 - 2.40 mg/dL   Phosphorus   Result Value Ref Range    Phosphorus 2.3 (L) 2.5 - 4.9 mg/dL   Creatine Kinase   Result Value Ref Range    Creatine Kinase 190 0 - 325 U/L   Manual Differential   Result Value Ref Range    Neutrophils %, Manual 73.0 40.0 - 80.0 %    Bands %, Manual 12.2 0.0 - 5.0 %    Lymphocytes %, Manual 7.0 13.0 - 44.0 %    Monocytes %, Manual 7.8 2.0 - 10.0 %    Eosinophils %, Manual 0.0 0.0 - 6.0 %    Basophils %, Manual 0.0 0.0 - 2.0 %    Atypical Lymphocytes %, Manual 0.0 0.0 - 2.0 %    Metamyelocytes %, Manual 0.0 0.0 - 0.0 %    Myelocytes %, Manual 0.0 0.0 - 0.0 %    Plasma Cells %, Manual 0.0 0.00 - 0.00 %    Promyelocytes %, Manual 0.0 0.0 - 0.0 %    Blasts %, Manual 0.0 0.0 - 0.0 %    Others %, Manual 0.0 %    Seg Neutrophils Absolute, Manual 6.57 1.20 -  7.00 x10*3/uL    Bands Absolute, Manual 1.10 (H) 0.00 - 0.70 x10*3/uL    Lymphocytes Absolute, Manual 0.63 (L) 1.20 - 4.80 x10*3/uL    Monocytes Absolute, Manual 0.70 0.10 - 1.00 x10*3/uL    Eosinophils Absolute, Manual 0.00 0.00 - 0.70 x10*3/uL    Basophils Absolute, Manual 0.00 0.00 - 0.10 x10*3/uL    Atypical Lymphs Absolute, Manual 0.00 0.00 - 0.50 x10*3/uL    Metamyelocytes Absolute, Manual 0.00 0.00 - 0.00 x10*3/uL    Myelocytes Absolute, Manual 0.00 0.00 - 0.00 x10*3/uL    Plasma Cells Absolute, Manual 0.00 0.00 - 0.00 x10*3/uL    Promyelocytes Absolute, Manual 0.00 0.00 - 0.00 x10*3/uL    Blasts Absolute, Manual 0.00 0.00 - 0.00 x10*3/uL    Others Absolute, Manual 0.00 x10*3/uL    Total Cells Counted 115     Neutrophils Absolute, Manual 7.67 1.20 - 7.70 x10*3/uL    Manual nRBC per 100 Cells 0.0 0.0 - 0.0 %    RBC Morphology See Below     Polychromasia Mild     RBC Fragments Few     Target Cells Few     Ovalocytes Few    POCT GLUCOSE   Result Value Ref Range    POCT Glucose 152 (H) 74 - 99 mg/dL   Renal function panel   Result Value Ref Range    Glucose 123 (H) 74 - 99 mg/dL    Sodium 134 (L) 136 - 145 mmol/L    Potassium 3.2 (L) 3.5 - 5.3 mmol/L    Chloride 97 (L) 98 - 107 mmol/L    Bicarbonate 29 21 - 32 mmol/L    Anion Gap 11 10 - 20 mmol/L    Urea Nitrogen 21 6 - 23 mg/dL    Creatinine 1.03 0.50 - 1.30 mg/dL    eGFR 86 >60 mL/min/1.73m*2    Calcium 7.1 (L) 8.6 - 10.6 mg/dL    Phosphorus 2.5 2.5 - 4.9 mg/dL    Albumin 2.7 (L) 3.4 - 5.0 g/dL   CBC and Auto Differential   Result Value Ref Range    WBC 11.0 4.4 - 11.3 x10*3/uL    nRBC 0.2 (H) 0.0 - 0.0 /100 WBCs    RBC 3.80 (L) 4.50 - 5.90 x10*6/uL    Hemoglobin 11.1 (L) 13.5 - 17.5 g/dL    Hematocrit 32.4 (L) 41.0 - 52.0 %    MCV 85 80 - 100 fL    MCH 29.2 26.0 - 34.0 pg    MCHC 34.3 32.0 - 36.0 g/dL    RDW 16.1 (H) 11.5 - 14.5 %    Platelets 318 150 - 450 x10*3/uL    Neutrophils % 74.0 40.0 - 80.0 %    Immature Granulocytes %, Automated 0.6 0.0 - 0.9 %     Lymphocytes % 13.5 13.0 - 44.0 %    Monocytes % 9.4 2.0 - 10.0 %    Eosinophils % 2.2 0.0 - 6.0 %    Basophils % 0.3 0.0 - 2.0 %    Neutrophils Absolute 8.12 (H) 1.20 - 7.70 x10*3/uL    Immature Granulocytes Absolute, Automated 0.07 0.00 - 0.70 x10*3/uL    Lymphocytes Absolute 1.48 1.20 - 4.80 x10*3/uL    Monocytes Absolute 1.03 (H) 0.10 - 1.00 x10*3/uL    Eosinophils Absolute 0.24 0.00 - 0.70 x10*3/uL    Basophils Absolute 0.03 0.00 - 0.10 x10*3/uL   Magnesium   Result Value Ref Range    Magnesium 2.81 (H) 1.60 - 2.40 mg/dL   Comprehensive metabolic panel   Result Value Ref Range    Glucose 97 74 - 99 mg/dL    Sodium 134 (L) 136 - 145 mmol/L    Potassium 3.6 3.5 - 5.3 mmol/L    Chloride 98 98 - 107 mmol/L    Bicarbonate 26 21 - 32 mmol/L    Anion Gap 14 10 - 20 mmol/L    Urea Nitrogen 17 6 - 23 mg/dL    Creatinine 0.93 0.50 - 1.30 mg/dL    eGFR >90 >60 mL/min/1.73m*2    Calcium 7.2 (L) 8.6 - 10.6 mg/dL    Albumin 2.9 (L) 3.4 - 5.0 g/dL    Alkaline Phosphatase 957 (H) 33 - 120 U/L    Total Protein 6.3 (L) 6.4 - 8.2 g/dL    AST 95 (H) 9 - 39 U/L    Bilirubin, Total 3.8 (H) 0.0 - 1.2 mg/dL    ALT 49 10 - 52 U/L   Phosphorus   Result Value Ref Range    Phosphorus 1.6 (L) 2.5 - 4.9 mg/dL     Rad Onc CT Sim Images  Result Date: 7/15/2025  These images are not reportable by radiology and will not be interpreted by  Radiologists.    NM PET CT bone skull base to mid thigh  Result Date: 6/25/2025  Interpreted By:  Luís Garcia and Barbat Antonio STUDY: NM PET CT SKULL BASE TO MID THIGH;  6/25/2025 10:32 am   INDICATION: Signs/Symptoms:Pancreatic cancer response assessment.   ,C25.1 Malignant neoplasm of body of pancreas (Multi)   COMPARISON: CT chest abdomen and pelvis with IV contrast 05/12/2025.   ACCESSION NUMBER(S): [id number]   ORDERING CLINICIAN: FLORENCIO MAJANO   TECHNIQUE: DIVISION OF NUCLEAR MEDICINE POSITRON EMISSION TOMOGRAPHY (PET-CT)   The patient received an intravenous dose of 11.7 mCi of Fluorine-18  fluorodeoxyglucose (FDG).  The patient was placed in a dark quiet room. Positron emission tomographic (PET) images from skull vertex to the feet were then acquired after a one hour delay. Also acquired was a contemporaneous low dose non-contrast CT scan performed for attenuation correction of PET images and anatomic localization.  The PET and CT images were digitally fused for display.  All images were acquired on a combined PET-CT scanner unit.  Some areas of FDG accumulation may be described in standardized uptake value (SUV) units.   CODING: Subsequent Treatment Strategy (PS)   CALIBRATION: Dose Injection-to-Scan Interval (mins): 71 min Mediastinal bloodpool SUV (normal 1.5-2.5): 2.4 Blood glucose: 126 mg/dL   FINDINGS: HEAD AND NECK: No evidence of focal hypermetabolic lesion in the brain parenchyma, noting that evaluation is limited because of the expected physiologic diffuse FDG uptake in the brain. No focal hypermetabolic soft tissue lesion is seen in the neck. No hypermetabolic cervical lymphadenopathy is present.   CHEST: No focal hypermetabolic lesion is seen in the lung parenchyma. No evidence of hypermetabolic mediastinal, hilar or axillary lymphadenopathy.   ABDOMEN AND PELVIS: Physiologic radiotracer uptake is present in the liver and spleen with excretion into the bowel loops and the genitourinary tract. Postsurgical changes consistent with distal pancreatectomy, splenectomy and cholecystectomy. There is no evidence of focal hypermetabolic activity in the surgical bed to suggest local reoccurrence. There has been superimposed interval development of multiple hypermetabolic bilateral hepatic lesions (max SUV: 13.3). Interval development of a hypermetabolic right adrenal lesion (max SUV: 10.7). Interval development of multiple hypermetabolic para-aortic nodes (max SUV: 16), aortocaval nodes (max SUV: 11.1). Interval development of a hypermetabolic several soft tissue implants, including a hypermetabolic  implant in the posterior pararenal space (max SUV: 5.1).     MUSCULOSKELETAL/EXTREMITIES: Interval development of multifocal axial and proximal appendicular hypermetabolic lesions, consistent with osseous metastatic disease. Involved structures include the spine most prominent at L2 (max SUV: 20.3), Left humeral head (max SUV: 10), sternum, proximal right femur (max SUV: 13.8), several bilateral ribs most prominently the left 3rd rib (max SUV: 20.1), right iliac wing (max SUV: 18.3). There are several additional hypermetabolic foci within the muscles consistent with intramuscular implants, including the left gluteus musculature (max SUV: 6.1), right abdominal wall musculature (max SUV: 2.9), left adductor muscle (max SUV: 6.9).       1. Interval development of multiple hypermetabolic hepatic lesions, a right adrenal lesion, intra-abdominal nodes and intra-abdominal soft tissue implants. Additionally, there has been interval development of multiple hypermetabolic lesions throughout the axial and proximal appendicular skeleton. These findings are favored to represent metastatic disease/progression of disease.     I personally reviewed the images/study and I agree with the findings as stated by Obinna Espinoza MD. This study was interpreted at [facility], [place], [place]   MACRO: None   Signed by: Luís Garcia 6/25/2025 4:07 PM Dictation workstation:   [id number]         Assessment/Plan     Introduction to Integrative Medicine:  Spoke with pt at bedside. Patient seemed to appreciate the extra layer of support.   Integrative Medicine was introduced as a service for patients with serious illness to help with symptoms through non-pharmacological management, such as mindfulness, acupuncture, and gentle bodywork. Such interventions can assist with symptoms such as anxiety, fatigue, nausea, depression and pain.     The [facility] Integrative Medicine Symptom Management program  offers multi-disciplinary supervised care of cancer patients using Integrative Modalities billed to insurance using NCCN and SIO/ASCO guideline-driven practices.  ESAS is obtained prior to and after each treatment by the practitioner       Pre- Post-   Wellbeing   4  4   Coping  8  4   Pain  8  6   Fatigue  8  4   Anxiety   5  6   Depression  5  6   Stress  8  6   Nausea  5  0            Abdominal pain:   pain related to malignancy   Pain is well-controlled  Defer to supportive oncology team for adequate PO/IV pain regimen   Recommend integrative therapy modalities as pt allows:  -Acupuncture; provided pt education today. Pt completed session with acupuncturist        - Pt is an adequate candidate for acupuncture; pt is afebrile, WBC 11, ANC 8.12, INR 1.7, PTT 35, platelets 141       - Acupuncturist was notified that pt is on anticoagulation; this is not a contraindication per NCCN guidelines. Consent was obtained and documented, and placed in pt's physical medical chart. Primary team and bedside RN notified of pt's acupuncture treatment completed by acupuncturist today.  -Acupressure, gua sha   -Gentle bodywork and stretching as tolerated      -Art therapy  -Music therapy  -Chaplaincy   -Pet therapy - consult placed         Nausea/Vomiting:  Intermittent nausea and vomiting related to opioids, chemotherapy  -Defer to supportive oncology recs for pharmacological management  -Recommend integrative medicine modalities as listed above        Altered Mood:  Anxiety and/or depression r/t health concerns  History of anxiety/depression  -Recommend integrative medicine modalities as listed above  Mindfulness   Ailson: AMDtx, Calm, Headspace, Insight Timer  Guided Imagery  Meditation (15 min in the morning) - consider mindfulness (Mindfulness based Stress Reduction)   Apps such as CALM or Headspace  Deep breathing: Alternate nostril breathing and Deep abdominal breathing (5 min) in the morning   Jer - Guided Meditation        Thank you for allowing us to participate in the care of this patient. Integrative Medicine Team will continue to follow as needed.  Please contact team with any questions or concerns.           GOLD Crump (available by Profyle)  [facility]  Inpatient Integrative Medicine      I spent 45 minutes in the care of this patient which included chart review, interviewing patient/family, discussion with primary team, coordination of care, and documentation.     Medical Decision Making was high level due to high complexity of problems, extensive data review, and high risk of management/treatment.                    [1]   Family History  Problem Relation Name Age of Onset    Transient ischemic attack Mother      Heart disease Father      Brain cancer Mother's Brother      Breast cancer Cousin     [2] dexAMETHasone, 4 mg, intravenous, BID  DULoxetine, 20 mg, oral, Daily  enoxaparin, 40 mg, subcutaneous, Daily  fenofibrate, 160 mg, oral, Daily  fentaNYL, 1 patch, transdermal, q72h  fluticasone, 1 spray, Each Nostril, Daily  fluticasone furoate-vilanteroL, 1 puff, inhalation, Daily  pantoprazole, 40 mg, oral, Daily before breakfast   Or  pantoprazole, 40 mg, intravenous, Daily before breakfast  pregabalin, 150 mg, oral, Nightly  pregabalin, 75 mg, oral, BID  sodium phosphate, 21 mmol, intravenous, Once  [3]    [4] PRN medications: acetaminophen, albuterol, alteplase, cyclobenzaprine, HYDROmorphone, LORazepam, magnesium hydroxide, naloxone, oxyCODONE, oxyCODONE, polyethylene glycol, trimethobenzamide

## 2025-07-24 NOTE — CONSULTS
"Nutrition Initial Assessment:   Nutrition Assessment    Reason for Assessment: Admission nursing screening    Patient is a 54 y.o. male presenting with nausea, vomiting, diarrhea in the setting of metastatic pancreatic adenocarcinoma with multifocal progression       Nutrition History:  Food and Nutrient History: Met with pt and his wife this afternoon at bedside. Other family members present. Wife says that over the last week, the pt has been barely eating anything. She says he may eat an egg or a piece of toast or half a brekfast burrito in the morning, and something small later in the day. He has been drinking Ensure Clear TID. Pt says that he strongly dislikes any milky ONS. For the past 1-2 days, pt started to experince ongoing N/V. Wife mentioned that prior than the last week or so, the pt was eating well. Pt would like to receive Ensure Clear TID while admitted.       Anthropometrics:  Height: 172.7 cm (5' 8\")   Weight: 76.2 kg (168 lb)   BMI (Calculated): 25.55  IBW/kg (Dietitian Calculated): 70 kg  Percent of IBW: 109 %       Weight History:   Wt Readings from Last 15 Encounters:   07/23/25 76.2 kg (168 lb)   07/23/25 79.4 kg (175 lb)   07/18/25 77.9 kg (171 lb 10.1 oz)   07/10/25 79 kg (174 lb 1.6 oz)   07/07/25 79.7 kg (175 lb 11.3 oz)   07/03/25 79.7 kg (175 lb 11.2 oz)   06/20/25 81.1 kg (178 lb 14.5 oz)   06/12/25 81.8 kg (180 lb 5.4 oz)   06/02/25 81.7 kg (180 lb 1.6 oz)   05/23/25 82.2 kg (181 lb 3.5 oz)   05/20/25 81.8 kg (180 lb 6.4 oz)   05/15/25 83.5 kg (184 lb 1.4 oz)   04/03/25 85 kg (187 lb 6.3 oz)   03/06/25 85.4 kg (188 lb 4.4 oz)   02/06/25 85.5 kg (188 lb 9.6 oz)         Weight Change %:  Weight History / % Weight Change: Wife says that pt's UBW was somewhere around 188 and he has been losing weight for the last month. Per chart review, pt has lost 1.7kg (2.2%) in 1 week; 7.4kg (9.1%) in 2 months; 8.8kg (10.3%) in 3 months; 9.3kg (10.9%) in 5 months  Significant Weight Loss: " "Yes  Interpretation of Weight Loss: >7.5% in 3 months    Nutrition Focused Physical Exam Findings:    Subcutaneous Fat Loss:   Orbital Fat Pads: Mild-Moderate (slight dark circles and slight hollowing)  Buccal Fat Pads: Mild-Moderate (flat cheeks, minimal bounce)  Muscle Wasting:  Temporalis: Mild-Moderate (slight depression)  Pectoralis (Clavicular Region): Mild-Moderate (some protrusion of clavicle)  Deltoid/Trapezius: Mild-Moderate (slight protrusion of acromion process)  Quadriceps: Mild-Moderate (mild depression on inner and outer thigh)  Edema:  Edema: none  Physical Findings:  Skin: Negative    Nutrition Significant Labs:  CBC Trend:   Results from last 7 days   Lab Units 07/24/25  0633 07/23/25  1222   WBC AUTO x10*3/uL 11.0 9.0   RBC AUTO x10*6/uL 3.80* 3.90*   HEMOGLOBIN g/dL 11.1* 11.7*   HEMATOCRIT % 32.4* 33.2*   MCV fL 85 85   PLATELETS AUTO x10*3/uL 318 330    , BMP Trend:   Results from last 7 days   Lab Units 07/24/25  0633 07/23/25  2354 07/23/25  1222   GLUCOSE mg/dL 97 123* 149*   CALCIUM mg/dL 7.2* 7.1* 7.1*   SODIUM mmol/L 134* 134* 134*   POTASSIUM mmol/L 3.6 3.2* 2.9*   CO2 mmol/L 26 29 28   CHLORIDE mmol/L 98 97* 96*   BUN mg/dL 17 21 14   CREATININE mg/dL 0.93 1.03 1.00    , Renal Lab Trend:   Results from last 7 days   Lab Units 07/24/25  0633 07/23/25 2354 07/23/25  1222   POTASSIUM mmol/L 3.6 3.2* 2.9*   PHOSPHORUS mg/dL 1.6* 2.5 2.3*   SODIUM mmol/L 134* 134* 134*   MAGNESIUM mg/dL 2.81*  --  2.87*   EGFR mL/min/1.73m*2 >90 86 89   BUN mg/dL 17 21 14   CREATININE mg/dL 0.93 1.03 1.00    , Vit D: No results found for: \"VITD25\" , Vit B12: No results found for: \"BKFFVGJS66\"     Nutrition Specific Medications:  Scheduled medications  Scheduled Medications[1]  Continuous medications  Continuous Medications[2]  PRN medications  PRN Medications[3]      I/O:   Last BM Date: 07/23/25;      Dietary Orders (From admission, onward)       Start     Ordered    07/23/25 1411  Oral nutritional " supplements  Until discontinued        Question Answer Comment   Deliver with Breakfast    Deliver with All meals    Deliver with Lunch    Deliver with Dinner    Select supplement: Ensure Clear        07/23/25 1411    07/23/25 1326  May Participate in Room Service  ( ROOM SERVICE MAY PARTICIPATE)  Once        Question:  .  Answer:  Yes    07/23/25 1325    07/23/25 1154  Adult diet Regular  Diet effective now        Question:  Diet type  Answer:  Regular    07/23/25 1155                     Estimated Needs:   Total Energy Estimated Needs in 24 hours (kCal):  (6898-8666)  Method for Estimating Needs: 30-35kcal/kg IBW  Total Protein Estimated Needs in 24 Hours (g): 91 g  Method for Estimating 24 Hour Protein Needs: ~ 1.3g/kg IBW  Total Fluid Estimated Needs in 24 Hours (mL):  (per MD/team)  Method for Estimating 24 Hour Fluid Needs: Per MD/team        Nutrition Diagnosis   Malnutrition Diagnosis  Patient has Malnutrition Diagnosis: Yes  Diagnosis Status: New  Malnutrition Diagnosis: Severe malnutrition related to acute disease or injury  As Evidenced by: <50% energy needs for >/= 5 days,  Additional Assessment Information: acute on chronic            Nutrition Interventions/Recommendations   Nutrition prescription for oral nutrition    Nutrition Recommendations:  Ensure Clear TID  Gelatein Plus once daily  Recommend checking Vitamin D and supplementing if insufficient     Nutrition Interventions/Goals:   Medical Food Supplement: Commercial food medical food supplement therapy, Commercial beverage medical food supplement therapy  Goal: Ensure Clear (240kcal and 8gm protein each); Gelatein Plus (160kcal and 20gm protein each)      Education Documentation  No documentation found.            Nutrition Monitoring and Evaluation   Estimated Energy Intake: Energy intake 50 -75% of estimated energy needs    Body Weight: Body weight - Maintain stable weight         Digestive System Finding: Diarrhea, Nausea, Vomiting    Goal  Status: New goal(s) identified    Time Spent (min): 60 minutes            [1] dexAMETHasone, 4 mg, intravenous, BID  [START ON 7/25/2025] DULoxetine, 30 mg, oral, Daily  enoxaparin, 40 mg, subcutaneous, Daily  fenofibrate, 160 mg, oral, Daily  fentaNYL, 1 patch, transdermal, q72h  fluticasone, 1 spray, Each Nostril, Daily  fluticasone furoate-vilanteroL, 1 puff, inhalation, Daily  pantoprazole, 40 mg, oral, Daily before breakfast   Or  pantoprazole, 40 mg, intravenous, Daily before breakfast  polyethylene glycol, 17 g, oral, Daily  potassium phosphate, 21 mmol, intravenous, Once  pregabalin, 150 mg, oral, Nightly  pregabalin, 75 mg, oral, BID  [2]    [3] PRN medications: acetaminophen, albuterol, alteplase, cyclobenzaprine, docusate sodium, HYDROmorphone, LORazepam, magnesium hydroxide, naloxone, oxyCODONE, oxyCODONE, polyethylene glycol, trimethobenzamide

## 2025-07-24 NOTE — PROGRESS NOTES
Deshawn Foley is a 54 y.o. male on day 1 of admission presenting with Vomiting.      Subjective   - No acute events overnight  - Wants to work on pain regimen today       Objective     Last Recorded Vitals  /84 (BP Location: Right arm, Patient Position: Lying)   Pulse 107   Temp 37.3 °C (99.1 °F) (Temporal)   Resp 18   Wt 76.2 kg (168 lb)   SpO2 93%   Intake/Output last 3 Shifts:    Intake/Output Summary (Last 24 hours) at 7/24/2025 1337  Last data filed at 7/24/2025 1304  Gross per 24 hour   Intake 120 ml   Output 0 ml   Net 120 ml       Admission Weight  Weight: 76.2 kg (168 lb) (07/23/25 1212)    Daily Weight  07/23/25 : 76.2 kg (168 lb)    Image Results  ECG 12 lead  Normal sinus rhythm  Low voltage QRS  Nonspecific ST abnormality  Prolonged QT  Abnormal ECG  When compared with ECG of 23-JUL-2025 12:33, (unconfirmed)  Fusion complexes are no longer Present  Premature ventricular complexes are no longer Present      Physical Exam  HENT:      Head: Normocephalic.     Eyes:      General: Scleral icterus present.      Pupils: Pupils are equal, round, and reactive to light.       Cardiovascular:      Rate and Rhythm: Normal rate and regular rhythm.   Pulmonary:      Effort: Pulmonary effort is normal.     Skin:     General: Skin is warm.      Coloration: Skin is jaundiced.     Neurological:      General: No focal deficit present.      Mental Status: He is alert.         Relevant Results  Results for orders placed or performed during the hospital encounter of 07/23/25 (from the past 24 hours)   Renal function panel   Result Value Ref Range    Glucose 123 (H) 74 - 99 mg/dL    Sodium 134 (L) 136 - 145 mmol/L    Potassium 3.2 (L) 3.5 - 5.3 mmol/L    Chloride 97 (L) 98 - 107 mmol/L    Bicarbonate 29 21 - 32 mmol/L    Anion Gap 11 10 - 20 mmol/L    Urea Nitrogen 21 6 - 23 mg/dL    Creatinine 1.03 0.50 - 1.30 mg/dL    eGFR 86 >60 mL/min/1.73m*2    Calcium 7.1 (L) 8.6 - 10.6 mg/dL    Phosphorus 2.5 2.5 - 4.9  mg/dL    Albumin 2.7 (L) 3.4 - 5.0 g/dL   CBC and Auto Differential   Result Value Ref Range    WBC 11.0 4.4 - 11.3 x10*3/uL    nRBC 0.2 (H) 0.0 - 0.0 /100 WBCs    RBC 3.80 (L) 4.50 - 5.90 x10*6/uL    Hemoglobin 11.1 (L) 13.5 - 17.5 g/dL    Hematocrit 32.4 (L) 41.0 - 52.0 %    MCV 85 80 - 100 fL    MCH 29.2 26.0 - 34.0 pg    MCHC 34.3 32.0 - 36.0 g/dL    RDW 16.1 (H) 11.5 - 14.5 %    Platelets 318 150 - 450 x10*3/uL    Neutrophils % 74.0 40.0 - 80.0 %    Immature Granulocytes %, Automated 0.6 0.0 - 0.9 %    Lymphocytes % 13.5 13.0 - 44.0 %    Monocytes % 9.4 2.0 - 10.0 %    Eosinophils % 2.2 0.0 - 6.0 %    Basophils % 0.3 0.0 - 2.0 %    Neutrophils Absolute 8.12 (H) 1.20 - 7.70 x10*3/uL    Immature Granulocytes Absolute, Automated 0.07 0.00 - 0.70 x10*3/uL    Lymphocytes Absolute 1.48 1.20 - 4.80 x10*3/uL    Monocytes Absolute 1.03 (H) 0.10 - 1.00 x10*3/uL    Eosinophils Absolute 0.24 0.00 - 0.70 x10*3/uL    Basophils Absolute 0.03 0.00 - 0.10 x10*3/uL   Magnesium   Result Value Ref Range    Magnesium 2.81 (H) 1.60 - 2.40 mg/dL   Comprehensive metabolic panel   Result Value Ref Range    Glucose 97 74 - 99 mg/dL    Sodium 134 (L) 136 - 145 mmol/L    Potassium 3.6 3.5 - 5.3 mmol/L    Chloride 98 98 - 107 mmol/L    Bicarbonate 26 21 - 32 mmol/L    Anion Gap 14 10 - 20 mmol/L    Urea Nitrogen 17 6 - 23 mg/dL    Creatinine 0.93 0.50 - 1.30 mg/dL    eGFR >90 >60 mL/min/1.73m*2    Calcium 7.2 (L) 8.6 - 10.6 mg/dL    Albumin 2.9 (L) 3.4 - 5.0 g/dL    Alkaline Phosphatase 957 (H) 33 - 120 U/L    Total Protein 6.3 (L) 6.4 - 8.2 g/dL    AST 95 (H) 9 - 39 U/L    Bilirubin, Total 3.8 (H) 0.0 - 1.2 mg/dL    ALT 49 10 - 52 U/L   Phosphorus   Result Value Ref Range    Phosphorus 1.6 (L) 2.5 - 4.9 mg/dL     ECG 12 lead  Result Date: 7/24/2025  Normal sinus rhythm Low voltage QRS Nonspecific ST abnormality Prolonged QT Abnormal ECG When compared with ECG of 23-JUL-2025 12:33, (unconfirmed) Fusion complexes are no longer Present  Premature ventricular complexes are no longer Present    Rad Onc CT Sim Images  Result Date: 7/15/2025  These images are not reportable by radiology and will not be interpreted by  Radiologists.    NM PET CT bone skull base to mid thigh  Result Date: 6/25/2025  Interpreted By:  Luís Garcia and Barbat Antonio STUDY: NM PET CT SKULL BASE TO MID THIGH;  6/25/2025 10:32 am   INDICATION: Signs/Symptoms:Pancreatic cancer response assessment.   ,C25.1 Malignant neoplasm of body of pancreas (Multi)   COMPARISON: CT chest abdomen and pelvis with IV contrast 05/12/2025.   ACCESSION NUMBER(S): [id number]   ORDERING CLINICIAN: FLORENCIO MAJANO   TECHNIQUE: DIVISION OF NUCLEAR MEDICINE POSITRON EMISSION TOMOGRAPHY (PET-CT)   The patient received an intravenous dose of 11.7 mCi of Fluorine-18 fluorodeoxyglucose (FDG).  The patient was placed in a dark quiet room. Positron emission tomographic (PET) images from skull vertex to the feet were then acquired after a one hour delay. Also acquired was a contemporaneous low dose non-contrast CT scan performed for attenuation correction of PET images and anatomic localization.  The PET and CT images were digitally fused for display.  All images were acquired on a combined PET-CT scanner unit.  Some areas of FDG accumulation may be described in standardized uptake value (SUV) units.   CODING: Subsequent Treatment Strategy (PS)   CALIBRATION: Dose Injection-to-Scan Interval (mins): 71 min Mediastinal bloodpool SUV (normal 1.5-2.5): 2.4 Blood glucose: 126 mg/dL   FINDINGS: HEAD AND NECK: No evidence of focal hypermetabolic lesion in the brain parenchyma, noting that evaluation is limited because of the expected physiologic diffuse FDG uptake in the brain. No focal hypermetabolic soft tissue lesion is seen in the neck. No hypermetabolic cervical lymphadenopathy is present.   CHEST: No focal hypermetabolic lesion is seen in the lung parenchyma. No evidence of hypermetabolic mediastinal,  hilar or axillary lymphadenopathy.   ABDOMEN AND PELVIS: Physiologic radiotracer uptake is present in the liver and spleen with excretion into the bowel loops and the genitourinary tract. Postsurgical changes consistent with distal pancreatectomy, splenectomy and cholecystectomy. There is no evidence of focal hypermetabolic activity in the surgical bed to suggest local reoccurrence. There has been superimposed interval development of multiple hypermetabolic bilateral hepatic lesions (max SUV: 13.3). Interval development of a hypermetabolic right adrenal lesion (max SUV: 10.7). Interval development of multiple hypermetabolic para-aortic nodes (max SUV: 16), aortocaval nodes (max SUV: 11.1). Interval development of a hypermetabolic several soft tissue implants, including a hypermetabolic implant in the posterior pararenal space (max SUV: 5.1).     MUSCULOSKELETAL/EXTREMITIES: Interval development of multifocal axial and proximal appendicular hypermetabolic lesions, consistent with osseous metastatic disease. Involved structures include the spine most prominent at L2 (max SUV: 20.3), Left humeral head (max SUV: 10), sternum, proximal right femur (max SUV: 13.8), several bilateral ribs most prominently the left 3rd rib (max SUV: 20.1), right iliac wing (max SUV: 18.3). There are several additional hypermetabolic foci within the muscles consistent with intramuscular implants, including the left gluteus musculature (max SUV: 6.1), right abdominal wall musculature (max SUV: 2.9), left adductor muscle (max SUV: 6.9).       1. Interval development of multiple hypermetabolic hepatic lesions, a right adrenal lesion, intra-abdominal nodes and intra-abdominal soft tissue implants. Additionally, there has been interval development of multiple hypermetabolic lesions throughout the axial and proximal appendicular skeleton. These findings are favored to represent metastatic disease/progression of disease.     I personally reviewed  the images/study and I agree with the findings as stated by Obinna Espinoza MD. This study was interpreted at [facility], [place], [place]   MACRO: None   Signed by: Luís Garcia 6/25/2025 4:07 PM Dictation workstation:   [id number]        Assessment & Plan      Deshawn Foley is a 54 y.o. male presenting with nausea, vomiting, diarrhea in the setting of metastatic pancreatic adenocarcinoma with multifocal progression.     #Metastatic Pancreatic Adenocarcinoma (stage 4)  ::Primary Oncologist: Dr. Nayak   ::PET CT: 06/12/2025: Showed significant progression of metastatic disease with hypermetabolic lesions in the liver, right adrenal gland, intra abdominal nodes, soft tissue, and appendicular skeleton     - Working towards transitioning to hospice: Goals of care conversation with Dr. Nayak pending  - Supportive oncology consult: Pending recommendations  - Pain Regimen: Tylenol 650 mg q6, Flexeril 5 mg TID PRN, oxycodone IR 20 mg q3 PRN (moderate),  oxycodone IR 30 mg q3 PRN (moderate)pregabalin 75 mg 1 tablet in the morning, 1 in the afternoon, and 2 tablets at night, IV Dilaudid 2mg q2hr PRN (breakthrough)  - Reached out to radiation oncology but they will like to follow up outpatient due to likely discharge once symptoms are more under control       #Nausea, Vomiting  #Hypokalemia, Hypophosphatemia   - Electrolyte abnormalities likely secondary to nausea, vomiting, and diarrhea  - Replete PRN: Mag>2, Phos>3, K>4  -Qtc: 559 so PRN Tigan 200 mg PRN     #Anxiety  - Continue with home ativan 0.5 mg TID     #HLD  - Holding rosuvastatin due to elevated LFTs  - Continue home fenofibrate     F: PRN  E: PRN  N: Regular Diet           Myrna Oro MD

## 2025-07-24 NOTE — PROGRESS NOTES
Spiritual Care Visit  Spiritual Care Request    Reason for Visit:  Spiritual and emotional support          Care Provided:   Patient identifies his source of hope, that which is keeping him going through his treatment and diagnosis is his relationship with the Lord.  Patient reports he has experienced a sense of God's presence every step of the way.   Patient shares a range of emotion.    Prayer with patient and multiple family members.       Sense of Community and or Hindu Affiliation:  Non-Orthodox

## 2025-07-24 NOTE — CARE PLAN
Problem: Pain - Adult  Goal: Verbalizes/displays adequate comfort level or baseline comfort level  Outcome: Progressing     Problem: Safety - Adult  Goal: Free from fall injury  Outcome: Progressing     Problem: Discharge Planning  Goal: Discharge to home or other facility with appropriate resources  Outcome: Progressing     Problem: Chronic Conditions and Co-morbidities  Goal: Patient's chronic conditions and co-morbidity symptoms are monitored and maintained or improved  Outcome: Progressing     Problem: Nutrition  Goal: Nutrient intake appropriate for maintaining nutritional needs  Outcome: Progressing     Problem: Skin  Goal: Decreased wound size/increased tissue granulation at next dressing change  Outcome: Progressing  Flowsheets (Taken 7/24/2025 1231)  Decreased wound size/increased tissue granulation at next dressing change: Promote sleep for wound healing  Goal: Participates in plan/prevention/treatment measures  Outcome: Progressing  Flowsheets (Taken 7/24/2025 1231)  Participates in plan/prevention/treatment measures: Elevate heels  Goal: Prevent/manage excess moisture  Outcome: Progressing  Flowsheets (Taken 7/24/2025 1231)  Prevent/manage excess moisture: Moisturize dry skin  Goal: Prevent/minimize sheer/friction injuries  Outcome: Progressing  Flowsheets (Taken 7/24/2025 1231)  Prevent/minimize sheer/friction injuries:   Increase activity/out of bed for meals   Use pull sheet  Goal: Promote/optimize nutrition  Outcome: Progressing  Flowsheets (Taken 7/24/2025 1231)  Promote/optimize nutrition: Monitor/record intake including meals  Goal: Promote skin healing  Outcome: Progressing  Flowsheets (Taken 7/24/2025 1231)  Promote skin healing: Turn/reposition every 2 hours/use positioning/transfer devices   The patient's goals for the shift include  pain control    The clinical goals for the shift include pain mgmt during shift

## 2025-07-24 NOTE — PROGRESS NOTES
SUPPORTIVE AND PALLIATIVE ONCOLOGY INPATIENT FOLLOW-UP    SERVICE DATE: 07/24/25     Updates and Recommendations (07/24/25):  Change duloxetine 30mg PO once daily HS [usual starting dose]--if pt tolerating x1 week [7/31/25], may increase dose to 60mg PO once daily HS     Change oxycodone IR 20mg PO q3h PRN for moderate pain     Change oxycodone IR 30mg PO q3h PRN for severe pain     Change hydromorphone 2mg IV q2h PRN for breakthrough pain -or- strict NPO    Pt endorsing feelings of mild constipation. Collaborated with pt and wife to update current bowel regimen recommendations.    Discontinue Miralax PRN    Start scheduled Miralax 17g PO once daily [per pt/wife request]    Start Colace 100mg PO BID PRN for constipation [part of pt's home regimen]    Of note, pt follows with Judy Zabala CNP, with [facility]. Her office number is [phone], though she is off on Thursdays. Per wife, alternative Palliative provider, Hilda Solorzano CNP, covers weekends and can be reached those days with the same number. I spoke with Judy via telephone [wife having provided alternate number] this afternoon to discuss pt's case and update her on pertinent details. Home pain and symptom regimen also clarified [see bolded details below] and Judy made aware of our inpatient team's changes to pt's existing regimen thus far.     Per my discussion with Judy Zabala CNP, she inquired whether there are any plans Radiation Oncology inpatient follow-up as pt with previous plans for XRT as of their team's most recent note on 7/10/25. Per discussion with primary team, they previously reached out to Rad Onc and plans for their team to follow up with pt in the outpatient setting.     ASSESSMENT/PLAN:  Deshawn Foley is a 54 y.o. male diagnosed with pancreatic adenocarcinoma with metastasis to liver, R adrenal, R intraabdominal lymph nodes, soft tissue, and appendicular skeleton (follows with Dr. Nayak, s/p multiple lines of chemo,  pancreatectomy, immunotherapy, currently on pembro, previous plans for outpatient XRT with  Rad Onc--not yet started). PMHx significant for anxiety. Admitted 7/23/25 for further evaluation and management of abdominal pain, n/v, diarrhea, and poor PO intake. Supportive and Palliative Oncology is following for assistance with pain and symptom management.     Cancer Related Pain  Lower abdominal/pelvic, groin, bilateral thigh, bilateral ankle pain 2/2 known malignancy.   PET/CT on 6/12/25 having shown DP of lesions in the liver, R adrenal, R intraabdominal lymph nodes, soft tissue, and appendicular skeleton.  Pain Type: Somatic, visceral   Pain Control: Sub-optimally controlled  Home regimen: pregabalin 75/75/150mg [TID spacing]--written as pregabalin 75mg QID due to insurance barriers per outpatient Palliative, though confirmed to be given 75/75/150mg TID spacing, oxycodone IR 20mg q4h PRN (using approx. x4/day), ibuprofen 400mg q6h PRN, fentanyl 100mcg/h TD patch q72h  Intolerances: None  Past 24h OME intake: 280 OME  Risk factors: None  Renal function WNL.   Reviewed: Alk Phos (957), ALT (49), AST (95), Bili Total (3.8)  Continue dexamethasone 4mg IV BID  Change duloxetine 30mg PO once daily HS [usual starting dose]--if pt tolerating x1 week [7/31/25], may increase dose to 60mg PO once daily HS   Continue pregabalin 75/75/150mg PO [TID spacing]  Continue scheduled fentanyl 100mcg/h TD patch q72h   Continue cyclobenzaprine 5mg PO TID PRN for muscle spasms  Continue acetaminophen 650mg PO q6h PRN for mild pain  Change oxycodone IR 20mg PO q3h PRN for moderate pain   Change oxycodone IR 30mg PO q3h PRN for severe pain   Change hydromorphone 2mg IV q2h PRN for breakthrough pain -or- strict NPO  Continue to monitor pain scores and administer PRN medications as appropriate  Continue/initiate nonpharmacologic pain management strategies including ice/heat therapy, distraction techniques, deep breathing/relaxation  techniques, calming music, and repositioning  Continue to monitor for signs of opioid efficacy (pain scores, improved functionality) and toxicity (pinpoint pupils, excess sedation/drowsiness/confusion, respiratory depression, etc.)    Nausea  Intermittent nausea and vomiting related to malignant disease process.  Home regimen: ondansetron PRN   No recent EKG uploaded into EMR. Per chart review of documentation, EKG on admission with QTc 559.  PPI per primary--especially agree with while on steroids  dexamethasone will likely assist with   Continue Tigan 200mg IM q6h PRN for n/v, first line    Constipation  At risk for constipation related to opioids, pt endorsing feelings of mild constipation.  Usual bowel pattern: Every day  Home regimen: Miralax PRN, MOM PRN, Colace PRN  LBM: 7/23/25  Discontinue Miralax PRN  Start scheduled Miralax 17g PO once daily [per pt/wife request]  Continue MOM 15ml PO once daily PRN for constipation  Start Colace 100mg PO BID PRN for constipation [part of pt's home regimen]  Goal to have BM without straining q48-72h, adjust regimen as needed  Encourage mobility as tolerated    Altered Mood  Acute on chronic anxiety related to health concerns.    Well controlled with current regimen.  Home regimen: lorazepam 0.5mg q6h PRN--written as lorazepam 1mg QID PRN due to insurance barriers per outpatient Palliative, though confirmed to be given 0.5mg q6h PRN [Apparently, there had been an insurance issue preventing prescription of 0.5mg dose as opposed to 1mg dose outpatient Palliative provider.]  duloxetine will also assist with   lorazepam 0.5mg PO QID PRN for anxiety   Spiritual Care, Art Therapy, Music Therapy, Pet Therapy, Acupuncture, and Integrative Oncology consulted    Sleeping Difficulty  Impaired sleep related to pain, anxiety, and hospital environment.  Home regimen: Home lorazepam  See pain and anxiety recs    Altered Nutrition  Altered nutrition related to recent n/v and malignant  disease process.  Weight loss: 7lbs in last 2 weeks  Home regimen: None  Nutrition consulted, appreciate recs  dexamethasone will also assist with     Disposition:  Please start the process of having prior authorization with meds to beds deliver medications to patient prior to discharge via [facility] pharmacy. Prescriptions will need to be sent 48-72 hours prior to discharge so that a prior authorization can be completed.     Discharge date pending resolution of acute hospital issues and ongoing GOC.   Pt follows with Judy Zabala CNP, with [facility] for [facility]. Her office number is [phone], though she is off on Thursdays. Per wife, alternative Palliative provider, Hilda Solorzano CNP, covers weekends and can be reached those days with the same number.     SIGNATURE: GOLD Jain   PAGER/CONTACT:  Contact information:  Supportive and Palliative Oncology  Monday-Friday 8 AM-5 PM  Epic Secure chat or pager [phone].  After hours and weekends:  pager [phone]    =====================================================================================================    SUBJECTIVE:    Pain Assessment:  Location: Lower abdomen/pelvis, groin, bilateral thigh, bilateral ankle pain  Duration: Constant  Characteristics:  Rating: 10/10  Descriptors: Aching, sore, cramping  Aggravating: Movement   Relieving: Analgesics--see EMR [could use improvement per pt], positioning, modifying activity, and ICDs  Interference with Function: Very Much    Opioid Requirements  Past 24h opioid requirements:  (7/23-7/24, 5512-0545)  fentanyl 100mcg/h TD patch x 1 = 200 OME  hydromorphone 0.4mg IV x 1 = 5 OME  oxycodone IR 20mg x 3 = 60mg =75 OME    Total 24h OME use: 280 OME    Symptom Assessment:  Nausea: none--now improved  Vomiting: none  Constipation: a little  Anxiety: none--well controlled    Information obtained from: chart review, interview of patient, and discussion with primary  team  ______________________________________________________________________     OBJECTIVE:    Lab Results   Component Value Date    WBC 11.0 07/24/2025    HGB 11.1 (L) 07/24/2025    HCT 32.4 (L) 07/24/2025    MCV 85 07/24/2025     07/24/2025     Lab Results   Component Value Date    GLUCOSE 97 07/24/2025    CALCIUM 7.2 (L) 07/24/2025     (L) 07/24/2025    K 3.6 07/24/2025    CO2 26 07/24/2025    CL 98 07/24/2025    BUN 17 07/24/2025    CREATININE 0.93 07/24/2025     Lab Results   Component Value Date    ALT 49 07/24/2025    AST 95 (H) 07/24/2025    ALKPHOS 957 (H) 07/24/2025    BILITOT 3.8 (H) 07/24/2025     Estimated Creatinine Clearance: 87.8 mL/min (by C-G formula based on SCr of 0.93 mg/dL).    Scheduled medications   Scheduled Medications[1]  Continuous medications  Continuous Medications[2]  PRN medications  acetaminophen, 650 mg, q6h PRN  albuterol, 1 puff, TID PRN  alteplase, 2 mg, PRN  cyclobenzaprine, 5 mg, TID PRN  HYDROmorphone, 0.5 mg, q3h PRN  magnesium hydroxide, 15 mL, Daily PRN  naloxone, 0.2 mg, q5 min PRN  oxyCODONE, 10 mg, q3h PRN  oxyCODONE, 20 mg, q3h PRN  polyethylene glycol, 17 g, Daily PRN  trimethobenzamide, 200 mg, q6h PRN    PHYSICAL EXAMINATION:    Vital Signs:   Vital signs reviewed  Visit Vitals  /83 (BP Location: Right arm, Patient Position: Lying)   Pulse 96   Temp 36.1 °C (97 °F) (Temporal)   Resp 18      0-10 (Numeric) Pain Score: 5 - Moderate pain     Physical Exam  Vitals reviewed.   Constitutional:       Comments: Alert, awake, ill appearing gentleman laying in bed. No signs of acute distress. Slow to respond, though remains pleasant, cooperative and participating in interview.     HENT:      Head:      Comments: Normocephalic, atraumatic.       Eyes:      Comments: Scleral icterus, EOM intact, wearing glasses.    Pulmonary:      Comments: Symmetrical chest rise. Regular rate and depth of respirations. Room air.   Abdominal:      Comments: Abdomen mildly  "distended, lower abdomen/pelvis tender.     Musculoskeletal:      Comments: Generalized muscle weakness and atrophy. WILSON x4. No visible extremity edema.     Skin:     Comments: No lesions, rash, or abrasions present on visible skin. Skin jaundiced.     Neurological:      Comments: A&Ox3, follows commands, no apparent sensory deficits.   Psychiatric:      Comments: Mood and behavior appropriate.        PALLIATIVE CARE ENCOUNTER:    Supportive and Palliative Oncology encounter:  Spoke with patient at bedside.  Emotional support provided.  Coordination of care: medication and symptom re-evaluation     Medical Decision Making/Goals of Care/Advance Care Planning:  (Per HAKEEM Benítez MD's, note on 7/24/25) Patient's current clinical condition, including diagnosis, prognosis, and management plan, and goals of care were discussed.   Life limiting disease: metastatic malignancy  Family: .  Lives with wife  Performance status: Moderate limitations due to pain independent with ADLs however needs assistance with IADLs  Joys/meaning/strength: Family  Understanding of health: Understands that he has pancreatic cancer with new mets to spine and plan for radiation to the chest and he was admitted because of nausea vomiting  Information: Wants full disclosure  Goals: States\" my goals do not hold good anymore since I might die soon\"  Worries and fears now and future: death   Code status discussion: Patient preferred to have this conversation tomorrow in the presence of wife     Advance Directives  Existence of Advance Directives: None  Decision maker: Surrogate decision maker is wifeNisha     =====================================================================================================    Signature and billing:  Medical complexity was high level due to due to complexity of problems, extensive data review, and high risk of management/treatment.    I spent 75 minutes in the care of this patient which included chart " review, interviewing patient/family, discussion with primary team, coordination of care, and documentation.    Data:   Diagnostic tests and information reviewed for today's visit: Conversation with primary team, Most recent labs and imaging results, Most recent EKG, Medications    Some elements copied from HAKEEM Benítez MD's, note on 7/23/25, the elements have been updated and all reflect current decision making from today, 07/24/25.    Plan of Care discussed with: Primary team, pt, pt's wife/family, Judyleydi Zabala CNP [[facility] provider]    Thank you for asking Supportive and Palliative Oncology to assist with care of this patient.  Recommendations will be communicated back to the consulting service by way of shared electronic medical record/secure chat/email or face-to-face.   We will continue to follow.  Please contact us for additional questions or concerns.    SIGNATURE: GOLD Jain   PAGER/CONTACT:  Contact information:  Supportive and Palliative Oncology  Monday-Friday 8 AM-5 PM, Epic Secure chat or pager [phone].  After hours and weekends: pager [phone]         [1] dexAMETHasone, 4 mg, intravenous, BID  enoxaparin, 40 mg, subcutaneous, Daily  fenofibrate, 160 mg, oral, Daily  fentaNYL, 1 patch, transdermal, q72h  fluticasone, 1 spray, Each Nostril, Daily  fluticasone furoate-vilanteroL, 1 puff, inhalation, Daily  LORazepam, 0.5 mg, oral, 4x daily  pantoprazole, 40 mg, oral, Daily before breakfast   Or  pantoprazole, 40 mg, intravenous, Daily before breakfast  pregabalin, 150 mg, oral, Nightly  pregabalin, 75 mg, oral, BID  sodium phosphate, 21 mmol, intravenous, Once  [2]

## 2025-07-25 ENCOUNTER — OFFICE VISIT (OUTPATIENT)
Dept: SURGICAL ONCOLOGY | Facility: HOSPITAL | Age: 55
End: 2025-07-25
Payer: MEDICARE

## 2025-07-25 LAB
ALBUMIN SERPL BCP-MCNC: 3 G/DL (ref 3.4–5)
ALBUMIN SERPL BCP-MCNC: 3.1 G/DL (ref 3.4–5)
ALP SERPL-CCNC: 1014 U/L (ref 33–120)
ALT SERPL W P-5'-P-CCNC: 52 U/L (ref 10–52)
AMMONIA PLAS-SCNC: 83 UMOL/L (ref 16–53)
ANION GAP SERPL CALC-SCNC: 12 MMOL/L (ref 10–20)
ANION GAP SERPL CALC-SCNC: 14 MMOL/L (ref 10–20)
AST SERPL W P-5'-P-CCNC: 89 U/L (ref 9–39)
BASOPHILS # BLD AUTO: 0.02 X10*3/UL (ref 0–0.1)
BASOPHILS NFR BLD AUTO: 0.1 %
BILIRUB SERPL-MCNC: 3.4 MG/DL (ref 0–1.2)
BUN SERPL-MCNC: 18 MG/DL (ref 6–23)
BUN SERPL-MCNC: 19 MG/DL (ref 6–23)
CALCIUM SERPL-MCNC: 7.2 MG/DL (ref 8.6–10.6)
CALCIUM SERPL-MCNC: 7.3 MG/DL (ref 8.6–10.6)
CHLORIDE SERPL-SCNC: 95 MMOL/L (ref 98–107)
CHLORIDE SERPL-SCNC: 96 MMOL/L (ref 98–107)
CO2 SERPL-SCNC: 25 MMOL/L (ref 21–32)
CO2 SERPL-SCNC: 26 MMOL/L (ref 21–32)
CREAT SERPL-MCNC: 0.88 MG/DL (ref 0.5–1.3)
CREAT SERPL-MCNC: 0.89 MG/DL (ref 0.5–1.3)
EGFRCR SERPLBLD CKD-EPI 2021: >90 ML/MIN/1.73M*2
EGFRCR SERPLBLD CKD-EPI 2021: >90 ML/MIN/1.73M*2
EOSINOPHIL # BLD AUTO: 0 X10*3/UL (ref 0–0.7)
EOSINOPHIL NFR BLD AUTO: 0 %
ERYTHROCYTE [DISTWIDTH] IN BLOOD BY AUTOMATED COUNT: 16.2 % (ref 11.5–14.5)
GLUCOSE BLD MANUAL STRIP-MCNC: 261 MG/DL (ref 74–99)
GLUCOSE SERPL-MCNC: 238 MG/DL (ref 74–99)
GLUCOSE SERPL-MCNC: 303 MG/DL (ref 74–99)
HCT VFR BLD AUTO: 31.8 % (ref 41–52)
HGB BLD-MCNC: 11.2 G/DL (ref 13.5–17.5)
IMM GRANULOCYTES # BLD AUTO: 0.15 X10*3/UL (ref 0–0.7)
IMM GRANULOCYTES NFR BLD AUTO: 0.9 % (ref 0–0.9)
LYMPHOCYTES # BLD AUTO: 0.97 X10*3/UL (ref 1.2–4.8)
LYMPHOCYTES NFR BLD AUTO: 5.7 %
MAGNESIUM SERPL-MCNC: 2.74 MG/DL (ref 1.6–2.4)
MCH RBC QN AUTO: 29.6 PG (ref 26–34)
MCHC RBC AUTO-ENTMCNC: 35.2 G/DL (ref 32–36)
MCV RBC AUTO: 84 FL (ref 80–100)
MONOCYTES # BLD AUTO: 1.17 X10*3/UL (ref 0.1–1)
MONOCYTES NFR BLD AUTO: 6.9 %
NEUTROPHILS # BLD AUTO: 14.73 X10*3/UL (ref 1.2–7.7)
NEUTROPHILS NFR BLD AUTO: 86.4 %
NRBC BLD-RTO: 0.2 /100 WBCS (ref 0–0)
PHOSPHATE SERPL-MCNC: 2.4 MG/DL (ref 2.5–4.9)
PHOSPHATE SERPL-MCNC: 2.6 MG/DL (ref 2.5–4.9)
PLATELET # BLD AUTO: 336 X10*3/UL (ref 150–450)
POTASSIUM SERPL-SCNC: 3.5 MMOL/L (ref 3.5–5.3)
POTASSIUM SERPL-SCNC: 4.1 MMOL/L (ref 3.5–5.3)
PROT SERPL-MCNC: 6.6 G/DL (ref 6.4–8.2)
RBC # BLD AUTO: 3.79 X10*6/UL (ref 4.5–5.9)
SODIUM SERPL-SCNC: 129 MMOL/L (ref 136–145)
SODIUM SERPL-SCNC: 131 MMOL/L (ref 136–145)
STAPHYLOCOCCUS SPEC CULT: NORMAL
WBC # BLD AUTO: 17 X10*3/UL (ref 4.4–11.3)

## 2025-07-25 PROCEDURE — 82947 ASSAY GLUCOSE BLOOD QUANT: CPT

## 2025-07-25 PROCEDURE — 80069 RENAL FUNCTION PANEL: CPT | Mod: CCI

## 2025-07-25 PROCEDURE — 2500000001 HC RX 250 WO HCPCS SELF ADMINISTERED DRUGS (ALT 637 FOR MEDICARE OP)

## 2025-07-25 PROCEDURE — 80053 COMPREHEN METABOLIC PANEL: CPT

## 2025-07-25 PROCEDURE — 82140 ASSAY OF AMMONIA: CPT

## 2025-07-25 PROCEDURE — 84100 ASSAY OF PHOSPHORUS: CPT

## 2025-07-25 PROCEDURE — 93010 ELECTROCARDIOGRAM REPORT: CPT | Performed by: INTERNAL MEDICINE

## 2025-07-25 PROCEDURE — 93005 ELECTROCARDIOGRAM TRACING: CPT

## 2025-07-25 PROCEDURE — 2500000004 HC RX 250 GENERAL PHARMACY W/ HCPCS (ALT 636 FOR OP/ED): Mod: JZ,TB

## 2025-07-25 PROCEDURE — G2212 PROLONG OUTPT/OFFICE VIS: HCPCS

## 2025-07-25 PROCEDURE — 2500000002 HC RX 250 W HCPCS SELF ADMINISTERED DRUGS (ALT 637 FOR MEDICARE OP, ALT 636 FOR OP/ED)

## 2025-07-25 PROCEDURE — 99233 SBSQ HOSP IP/OBS HIGH 50: CPT

## 2025-07-25 PROCEDURE — 1170000001 HC PRIVATE ONCOLOGY ROOM DAILY

## 2025-07-25 PROCEDURE — 85025 COMPLETE CBC W/AUTO DIFF WBC: CPT

## 2025-07-25 PROCEDURE — 83735 ASSAY OF MAGNESIUM: CPT

## 2025-07-25 PROCEDURE — 99232 SBSQ HOSP IP/OBS MODERATE 35: CPT

## 2025-07-25 PROCEDURE — 2500000004 HC RX 250 GENERAL PHARMACY W/ HCPCS (ALT 636 FOR OP/ED)

## 2025-07-25 RX ORDER — INSULIN LISPRO 100 [IU]/ML
0-5 INJECTION, SOLUTION INTRAVENOUS; SUBCUTANEOUS
Status: DISCONTINUED | OUTPATIENT
Start: 2025-07-25 | End: 2025-07-26 | Stop reason: HOSPADM

## 2025-07-25 RX ORDER — DULOXETIN HYDROCHLORIDE 30 MG/1
30 CAPSULE, DELAYED RELEASE ORAL NIGHTLY
Status: DISCONTINUED | OUTPATIENT
Start: 2025-07-26 | End: 2025-07-26 | Stop reason: HOSPADM

## 2025-07-25 RX ORDER — ONDANSETRON 8 MG/1
8 TABLET, FILM COATED ORAL EVERY 8 HOURS PRN
Status: DISCONTINUED | OUTPATIENT
Start: 2025-07-25 | End: 2025-07-26 | Stop reason: HOSPADM

## 2025-07-25 RX ORDER — DEXTROSE 50 % IN WATER (D50W) INTRAVENOUS SYRINGE
25
Status: DISCONTINUED | OUTPATIENT
Start: 2025-07-25 | End: 2025-07-26 | Stop reason: HOSPADM

## 2025-07-25 RX ORDER — PROCHLORPERAZINE MALEATE 10 MG
10 TABLET ORAL EVERY 6 HOURS PRN
Status: DISCONTINUED | OUTPATIENT
Start: 2025-07-25 | End: 2025-07-25

## 2025-07-25 RX ORDER — HYDROMORPHONE HYDROCHLORIDE 4 MG/1
4 TABLET ORAL
Refills: 0 | Status: DISCONTINUED | OUTPATIENT
Start: 2025-07-25 | End: 2025-07-26 | Stop reason: HOSPADM

## 2025-07-25 RX ORDER — DEXTROSE 50 % IN WATER (D50W) INTRAVENOUS SYRINGE
12.5
Status: DISCONTINUED | OUTPATIENT
Start: 2025-07-25 | End: 2025-07-26 | Stop reason: HOSPADM

## 2025-07-25 RX ORDER — POTASSIUM CHLORIDE 1.5 G/1.58G
20 POWDER, FOR SOLUTION ORAL ONCE
Status: COMPLETED | OUTPATIENT
Start: 2025-07-25 | End: 2025-07-25

## 2025-07-25 RX ORDER — FENTANYL 100 UG/H
2 PATCH TRANSDERMAL
Refills: 0 | Status: DISCONTINUED | OUTPATIENT
Start: 2025-07-25 | End: 2025-07-26 | Stop reason: HOSPADM

## 2025-07-25 RX ADMIN — DULOXETINE 30 MG: 30 CAPSULE, DELAYED RELEASE ORAL at 08:02

## 2025-07-25 RX ADMIN — ACETAMINOPHEN 650 MG: 325 TABLET ORAL at 12:09

## 2025-07-25 RX ADMIN — DEXAMETHASONE SODIUM PHOSPHATE 4 MG: 4 INJECTION, SOLUTION INTRA-ARTICULAR; INTRALESIONAL; INTRAMUSCULAR; INTRAVENOUS; SOFT TISSUE at 08:02

## 2025-07-25 RX ADMIN — HYDROMORPHONE HYDROCHLORIDE 2 MG: 2 INJECTION, SOLUTION INTRAMUSCULAR; INTRAVENOUS; SUBCUTANEOUS at 06:00

## 2025-07-25 RX ADMIN — HYDROMORPHONE HYDROCHLORIDE 6 MG: 4 TABLET ORAL at 17:32

## 2025-07-25 RX ADMIN — OXYCODONE HYDROCHLORIDE 30 MG: 10 TABLET ORAL at 07:59

## 2025-07-25 RX ADMIN — PANTOPRAZOLE SODIUM 40 MG: 40 TABLET, DELAYED RELEASE ORAL at 05:47

## 2025-07-25 RX ADMIN — PREGABALIN 150 MG: 75 CAPSULE ORAL at 20:30

## 2025-07-25 RX ADMIN — PREGABALIN 75 MG: 75 CAPSULE ORAL at 17:22

## 2025-07-25 RX ADMIN — INSULIN LISPRO 4 UNITS: 100 INJECTION, SOLUTION INTRAVENOUS; SUBCUTANEOUS at 12:03

## 2025-07-25 RX ADMIN — OXYCODONE HYDROCHLORIDE 30 MG: 10 TABLET ORAL at 04:05

## 2025-07-25 RX ADMIN — POLYETHYLENE GLYCOL 3350 17 G: 17 POWDER, FOR SOLUTION ORAL at 13:01

## 2025-07-25 RX ADMIN — INSULIN LISPRO 2 UNITS: 100 INJECTION, SOLUTION INTRAVENOUS; SUBCUTANEOUS at 17:23

## 2025-07-25 RX ADMIN — HYDROMORPHONE HYDROCHLORIDE 6 MG: 4 TABLET ORAL at 20:23

## 2025-07-25 RX ADMIN — FLUTICASONE FUROATE AND VILANTEROL TRIFENATATE 1 PUFF: 200; 25 POWDER RESPIRATORY (INHALATION) at 08:00

## 2025-07-25 RX ADMIN — HYDROMORPHONE HYDROCHLORIDE 6 MG: 4 TABLET ORAL at 11:04

## 2025-07-25 RX ADMIN — FENOFIBRATE 160 MG: 160 TABLET ORAL at 08:02

## 2025-07-25 RX ADMIN — PREGABALIN 75 MG: 75 CAPSULE ORAL at 08:03

## 2025-07-25 RX ADMIN — HYDROMORPHONE HYDROCHLORIDE 2 MG: 2 INJECTION, SOLUTION INTRAMUSCULAR; INTRAVENOUS; SUBCUTANEOUS at 01:58

## 2025-07-25 RX ADMIN — FENTANYL 2 PATCH: 100 PATCH TRANSDERMAL at 11:04

## 2025-07-25 RX ADMIN — CYCLOBENZAPRINE 5 MG: 10 TABLET, FILM COATED ORAL at 12:09

## 2025-07-25 RX ADMIN — POTASSIUM CHLORIDE 20 MEQ: 1.5 POWDER, FOR SOLUTION ORAL at 11:04

## 2025-07-25 RX ADMIN — FLUTICASONE PROPIONATE 1 SPRAY: 50 SPRAY, METERED NASAL at 08:02

## 2025-07-25 RX ADMIN — DEXAMETHASONE SODIUM PHOSPHATE 4 MG: 4 INJECTION, SOLUTION INTRA-ARTICULAR; INTRALESIONAL; INTRAMUSCULAR; INTRAVENOUS; SOFT TISSUE at 13:24

## 2025-07-25 ASSESSMENT — PAIN SCALES - GENERAL
PAINLEVEL_OUTOF10: 0 - NO PAIN
PAINLEVEL_OUTOF10: 8
PAINLEVEL_OUTOF10: 0 - NO PAIN
PAINLEVEL_OUTOF10: 8
PAINLEVEL_OUTOF10: 10 - WORST POSSIBLE PAIN
PAINLEVEL_OUTOF10: 3
PAINLEVEL_OUTOF10: 0 - NO PAIN
PAINLEVEL_OUTOF10: 7

## 2025-07-25 ASSESSMENT — COGNITIVE AND FUNCTIONAL STATUS - GENERAL
DRESSING REGULAR LOWER BODY CLOTHING: A LITTLE
DAILY ACTIVITIY SCORE: 23
TOILETING: A LITTLE
DRESSING REGULAR UPPER BODY CLOTHING: A LITTLE
DAILY ACTIVITIY SCORE: 20
STANDING UP FROM CHAIR USING ARMS: A LITTLE
TURNING FROM BACK TO SIDE WHILE IN FLAT BAD: A LITTLE
CLIMB 3 TO 5 STEPS WITH RAILING: A LITTLE
CLIMB 3 TO 5 STEPS WITH RAILING: A LITTLE
DRESSING REGULAR LOWER BODY CLOTHING: A LITTLE
MOBILITY SCORE: 19
HELP NEEDED FOR BATHING: A LITTLE
MOVING TO AND FROM BED TO CHAIR: A LITTLE
WALKING IN HOSPITAL ROOM: A LITTLE
WALKING IN HOSPITAL ROOM: A LITTLE
MOBILITY SCORE: 22

## 2025-07-25 ASSESSMENT — PAIN - FUNCTIONAL ASSESSMENT
PAIN_FUNCTIONAL_ASSESSMENT: 0-10

## 2025-07-25 ASSESSMENT — PAIN DESCRIPTION - LOCATION: LOCATION: ABDOMEN

## 2025-07-25 ASSESSMENT — PAIN DESCRIPTION - DESCRIPTORS: DESCRIPTORS: ACHING;SHOOTING

## 2025-07-25 NOTE — CARE PLAN
Problem: Pain - Adult  Goal: Verbalizes/displays adequate comfort level or baseline comfort level  Outcome: Progressing     Problem: Safety - Adult  Goal: Free from fall injury  Outcome: Progressing     Problem: Nutrition  Goal: Nutrient intake appropriate for maintaining nutritional needs  Outcome: Progressing     Problem: Skin  Goal: Decreased wound size/increased tissue granulation at next dressing change  Outcome: Progressing  Goal: Participates in plan/prevention/treatment measures  Outcome: Progressing  Goal: Prevent/manage excess moisture  Outcome: Progressing  Goal: Prevent/minimize sheer/friction injuries  Outcome: Progressing  Goal: Promote/optimize nutrition  Outcome: Progressing  Goal: Promote skin healing  Outcome: Progressing     The clinical goals for the shift include pain mgmt during shift

## 2025-07-25 NOTE — PROGRESS NOTES
Deshawn Foley is a 54 y.o. male on day 2 of admission presenting with Vomiting.      Subjective   - No acute events overnight  - Pt states his pain is improving on the pain regimen started by supportive oncology yesterday       Objective     Last Recorded Vitals  /87 (BP Location: Right arm, Patient Position: Lying)   Pulse 92   Temp 35.2 °C (95.4 °F) (Temporal)   Resp 16   Wt 76.2 kg (168 lb)   SpO2 95%   Intake/Output last 3 Shifts:    Intake/Output Summary (Last 24 hours) at 7/25/2025 0814  Last data filed at 7/24/2025 2354  Gross per 24 hour   Intake 240 ml   Output 600 ml   Net -360 ml       Admission Weight  Weight: 76.2 kg (168 lb) (07/23/25 1212)    Daily Weight  07/23/25 : 76.2 kg (168 lb)    Image Results  ECG 12 lead  Normal sinus rhythm  Low voltage QRS  Nonspecific ST abnormality  Prolonged QT  Abnormal ECG  When compared with ECG of 23-JUL-2025 12:33, (unconfirmed)  No significant change since    Confirmed by Juventino Paulino ([id number]) on 7/24/2025 4:04:05 PM      Physical Exam  HENT:      Head: Normocephalic.     Eyes:      Pupils: Pupils are equal, round, and reactive to light.       Cardiovascular:      Rate and Rhythm: Normal rate and regular rhythm.   Pulmonary:      Effort: Pulmonary effort is normal.   Abdominal:      Tenderness: There is abdominal tenderness.     Skin:     General: Skin is warm.      Capillary Refill: Capillary refill takes less than 2 seconds.      Coloration: Skin is jaundiced.     Neurological:      General: No focal deficit present.      Mental Status: He is alert.         Relevant Results  Results for orders placed or performed during the hospital encounter of 07/23/25 (from the past 24 hours)   CBC and Auto Differential   Result Value Ref Range    WBC 17.0 (H) 4.4 - 11.3 x10*3/uL    nRBC 0.2 (H) 0.0 - 0.0 /100 WBCs    RBC 3.79 (L) 4.50 - 5.90 x10*6/uL    Hemoglobin 11.2 (L) 13.5 - 17.5 g/dL    Hematocrit 31.8 (L) 41.0 - 52.0 %    MCV 84 80 - 100 fL    MCH 29.6 26.0  - 34.0 pg    MCHC 35.2 32.0 - 36.0 g/dL    RDW 16.2 (H) 11.5 - 14.5 %    Platelets 336 150 - 450 x10*3/uL    Neutrophils % 86.4 40.0 - 80.0 %    Immature Granulocytes %, Automated 0.9 0.0 - 0.9 %    Lymphocytes % 5.7 13.0 - 44.0 %    Monocytes % 6.9 2.0 - 10.0 %    Eosinophils % 0.0 0.0 - 6.0 %    Basophils % 0.1 0.0 - 2.0 %    Neutrophils Absolute 14.73 (H) 1.20 - 7.70 x10*3/uL    Immature Granulocytes Absolute, Automated 0.15 0.00 - 0.70 x10*3/uL    Lymphocytes Absolute 0.97 (L) 1.20 - 4.80 x10*3/uL    Monocytes Absolute 1.17 (H) 0.10 - 1.00 x10*3/uL    Eosinophils Absolute 0.00 0.00 - 0.70 x10*3/uL    Basophils Absolute 0.02 0.00 - 0.10 x10*3/uL   Magnesium   Result Value Ref Range    Magnesium 2.74 (H) 1.60 - 2.40 mg/dL   Comprehensive metabolic panel   Result Value Ref Range    Glucose 238 (H) 74 - 99 mg/dL    Sodium 131 (L) 136 - 145 mmol/L    Potassium 3.5 3.5 - 5.3 mmol/L    Chloride 96 (L) 98 - 107 mmol/L    Bicarbonate 25 21 - 32 mmol/L    Anion Gap 14 10 - 20 mmol/L    Urea Nitrogen 18 6 - 23 mg/dL    Creatinine 0.88 0.50 - 1.30 mg/dL    eGFR >90 >60 mL/min/1.73m*2    Calcium 7.2 (L) 8.6 - 10.6 mg/dL    Albumin 3.1 (L) 3.4 - 5.0 g/dL    Alkaline Phosphatase 1,014 (H) 33 - 120 U/L    Total Protein 6.6 6.4 - 8.2 g/dL    AST 89 (H) 9 - 39 U/L    Bilirubin, Total 3.4 (H) 0.0 - 1.2 mg/dL    ALT 52 10 - 52 U/L   Phosphorus   Result Value Ref Range    Phosphorus 2.6 2.5 - 4.9 mg/dL     ECG 12 lead  Result Date: 7/24/2025  Normal sinus rhythm Low voltage QRS Nonspecific ST abnormality Prolonged QT Abnormal ECG When compared with ECG of 23-JUL-2025 12:33, (unconfirmed) No significant change since Confirmed by Juventino Paulino ([id number]) on 7/24/2025 4:04:05 PM    Rad Onc CT Sim Images  Result Date: 7/15/2025  These images are not reportable by radiology and will not be interpreted by  Radiologists.    NM PET CT bone skull base to mid thigh  Result Date: 6/25/2025  Interpreted By:  Luís Garcia,  and Olga Yeboah  STUDY: NM PET CT SKULL BASE TO MID THIGH;  6/25/2025 10:32 am   INDICATION: Signs/Symptoms:Pancreatic cancer response assessment.   ,C25.1 Malignant neoplasm of body of pancreas (Multi)   COMPARISON: CT chest abdomen and pelvis with IV contrast 05/12/2025.   ACCESSION NUMBER(S): [id number]   ORDERING CLINICIAN: FLORENCIO MAJANO   TECHNIQUE: DIVISION OF NUCLEAR MEDICINE POSITRON EMISSION TOMOGRAPHY (PET-CT)   The patient received an intravenous dose of 11.7 mCi of Fluorine-18 fluorodeoxyglucose (FDG).  The patient was placed in a dark quiet room. Positron emission tomographic (PET) images from skull vertex to the feet were then acquired after a one hour delay. Also acquired was a contemporaneous low dose non-contrast CT scan performed for attenuation correction of PET images and anatomic localization.  The PET and CT images were digitally fused for display.  All images were acquired on a combined PET-CT scanner unit.  Some areas of FDG accumulation may be described in standardized uptake value (SUV) units.   CODING: Subsequent Treatment Strategy (PS)   CALIBRATION: Dose Injection-to-Scan Interval (mins): 71 min Mediastinal bloodpool SUV (normal 1.5-2.5): 2.4 Blood glucose: 126 mg/dL   FINDINGS: HEAD AND NECK: No evidence of focal hypermetabolic lesion in the brain parenchyma, noting that evaluation is limited because of the expected physiologic diffuse FDG uptake in the brain. No focal hypermetabolic soft tissue lesion is seen in the neck. No hypermetabolic cervical lymphadenopathy is present.   CHEST: No focal hypermetabolic lesion is seen in the lung parenchyma. No evidence of hypermetabolic mediastinal, hilar or axillary lymphadenopathy.   ABDOMEN AND PELVIS: Physiologic radiotracer uptake is present in the liver and spleen with excretion into the bowel loops and the genitourinary tract. Postsurgical changes consistent with distal pancreatectomy, splenectomy and cholecystectomy. There is no evidence of focal  hypermetabolic activity in the surgical bed to suggest local reoccurrence. There has been superimposed interval development of multiple hypermetabolic bilateral hepatic lesions (max SUV: 13.3). Interval development of a hypermetabolic right adrenal lesion (max SUV: 10.7). Interval development of multiple hypermetabolic para-aortic nodes (max SUV: 16), aortocaval nodes (max SUV: 11.1). Interval development of a hypermetabolic several soft tissue implants, including a hypermetabolic implant in the posterior pararenal space (max SUV: 5.1).     MUSCULOSKELETAL/EXTREMITIES: Interval development of multifocal axial and proximal appendicular hypermetabolic lesions, consistent with osseous metastatic disease. Involved structures include the spine most prominent at L2 (max SUV: 20.3), Left humeral head (max SUV: 10), sternum, proximal right femur (max SUV: 13.8), several bilateral ribs most prominently the left 3rd rib (max SUV: 20.1), right iliac wing (max SUV: 18.3). There are several additional hypermetabolic foci within the muscles consistent with intramuscular implants, including the left gluteus musculature (max SUV: 6.1), right abdominal wall musculature (max SUV: 2.9), left adductor muscle (max SUV: 6.9).       1. Interval development of multiple hypermetabolic hepatic lesions, a right adrenal lesion, intra-abdominal nodes and intra-abdominal soft tissue implants. Additionally, there has been interval development of multiple hypermetabolic lesions throughout the axial and proximal appendicular skeleton. These findings are favored to represent metastatic disease/progression of disease.     I personally reviewed the images/study and I agree with the findings as stated by Obinna Espinoza MD. This study was interpreted at [facility], [place], [place]   MACRO: None   Signed by: Luís Garcia 6/25/2025 4:07 PM Dictation workstation:   [id number]          Assessment & Plan    Deshawn Foley  is a 54 y.o. male presenting with nausea, vomiting, diarrhea in the setting of metastatic pancreatic adenocarcinoma with multifocal progression.       #Metastatic Pancreatic Adenocarcinoma (stage 4)  ::Primary Oncologist: Dr. Nayak   ::PET CT: 06/12/2025: Showed significant progression of metastatic disease with hypermetabolic lesions in the liver, right adrenal gland, intra abdominal nodes, soft tissue, and appendicular skeleton       - Supportive oncology consult: Adjusting pain regimen to orals today for discharge  - Pain Regimen: Tylenol 650 mg q6, Flexeril 5 mg TID PRN, dilaudid PO 4mg Q3hr (moderate), dilaudid 6mg PO Q3hr (severe), pregabalin 75 mg 1 tablet in the morning, 1 in the afternoon, and 2 tablets at night,   - Reached out to radiation oncology but they will like to follow up outpatient due to likely discharge once symptoms are more under control  - Ammonia Lab: Pending       #Nausea, Vomiting  #Hypokalemia, Hypophosphatemia   - Electrolyte abnormalities likely secondary to nausea, vomiting, and diarrhea  - Replete PRN: Mag>2, Phos>3, K>4  -Qtc: 559 so PRN Tigan 200 mg PRN     #Anxiety  - Continue with home ativan 0.5 mg TID     #HLD  - Holding rosuvastatin due to elevated LFTs  - Continue home fenofibrate     F: PRN  E: PRN  N: Regular Diet         Myrna Oro MD

## 2025-07-25 NOTE — PROGRESS NOTES
SUPPORTIVE AND PALLIATIVE ONCOLOGY INPATIENT FOLLOW-UP    SERVICE DATE: 07/25/25     Updates and Recommendations (07/25/25):  Discussed with pt and wife option of fentanyl TD patch up-titration vs rotation to scheduled methadone 10mg PO q8h for LA pain control. Explained my concern that pt is nearing the maximum dosing of fentanyl TD patch if increased based on past 24h OME, and methadone would allow for pt to receive higher OME requirements should be required for adequate pain control in the future. Pt and wife opting for fentanyl TD patch increase today, though are aware methadone may be an option if today's recommendations prove ineffective.     I have placed order to obtain updated EKG for QTc review today to see if pt is a candidate for rotation to methadone [QTc would need to be less than < 500] should rotation be required. Spoke with bedside RN, Getachew, regarding.     Defer dexamethasone tapering to primary team [though likely assisting with pain and nausea at this time]    Change duloxetine 30mg PO once daily HS--if pt tolerating x1 week [7/31/25], may increase dose to 60mg PO once daily HS     Change scheduled fentanyl 200mcg/h TD patch q72h [will likely require x2 100mcg/h TD patches to achieve recommended dose, please ensure both patches are newly placed at the same time to avoid lapses in patch expiration dates and pain control]    Discontinue oxycodone IR 20mg PO q3h PRN for moderate pain     Discontinue oxycodone IR 30mg PO q3h PRN for severe pain     Per discussion with Myrna Oro MD via Epic chat this AM, primary team with plans to discontinue hydromorphone 2mg IV q2h PRN in anticipation for discharge     Start hydromorphone IR 4mg PO q3h PRN for moderate pain    Start hydromorphone IR 6mg PO q3h PRN for severe pain     May re-add hydromorphone 2mg IV q2h PRN for breakthrough pain or strict NPO if pain becomes uncontrolled after trialling today's recommendations    See above recommendations for  obtaining repeat EKG today. If QTc < 500, recommend:  Discontinue Tigan PRN   Restart home ondansetron 8mg PO q8h PRN for n/v, first line  May additionally consider adding prochlorperazine 10mg PO q6h PRN for n/v, second line  Would be ideal if primary team considering discharge that pt may be sent home with available PO anti-emetic regimen    Pt continuing to endorse mild feelings of constipation. Encouraged use of PRN bowel regimen, pt declining scheduling additional agents. Pt's wife stating she will remind him this afternoon of available PRN bowel regimen medications should he change his mind.     I have sent an email to pt's outpatient Palliative Care provider, Judy Zabala CNP, regarding updated pain and symptom regimen this morning so that she may continue upon discharge. Per discussion with pt's wife, sending pt with at least 2 weeks of pain and symptom regimen medications should provide them enough time to seek follow-up with Judy. Please coordinate with pt and his wife what new medications and re-fills he requires upon discharge. If needed, please [place] [facility] [place] [facility] [place] at [phone] to speak with Judy or her colleague Hilda whom is also familiar with pt.     ASSESSMENT/PLAN:  Deshawn Foley is a 54 y.o. male diagnosed with pancreatic adenocarcinoma with metastasis to liver, R adrenal, R intraabdominal lymph nodes, soft tissue, and appendicular skeleton (follows with Dr. Nayak, s/p multiple lines of chemo, pancreatectomy, immunotherapy, currently on pembro, previous plans for outpatient XRT with  Rad Onc--not yet started). PMHx significant for anxiety. Admitted 7/23/25 for further evaluation and management of abdominal pain, n/v, diarrhea, and poor PO intake. Supportive and Palliative Oncology is following for assistance with pain and symptom management.      Cancer Related Pain  Lower abdominal/pelvic, groin, bilateral thigh, bilateral ankle pain 2/2 known malignancy.   PET/CT on 6/12/25  having shown DP of lesions in the liver, R adrenal, R intraabdominal lymph nodes, soft tissue, and appendicular skeleton.  Pain Type: Somatic, visceral   Pain Control: Fairly controlled  Home regimen: pregabalin 75/75/150mg [TID spacing]--written as pregabalin 75mg QID due to insurance barriers per outpatient Palliative, though confirmed to be given 75/75/150mg TID spacing, oxycodone IR 20mg q4h PRN (using approx. x4/day), ibuprofen 400mg q6h PRN, fentanyl 100mcg/h TD patch q72h  Intolerances: None  Past 24h OME intake: 570 OME  Risk factors: None  Renal function WNL.   Reviewed: Alk Phos (1,014), ALT (49), AST (89), Bili Total (3.4)  Discussed with pt and wife option of fentanyl TD patch up-titration vs rotation to scheduled methadone 10mg PO q8h for LA pain control. Explained my concern that pt is nearing the maximum dosing of fentanyl TD patch if increased based on past 24h OME, and methadone would allow for pt to receive higher OME requirements should be required for adequate pain control in the future. Pt and wife opting for fentanyl TD patch increase today, though are aware methadone may be an option if today's recommendations prove ineffective.   I have placed order to obtain updated EKG for QTc review today to see if pt is a candidate for rotation to methadone [QTc would need to be less than < 500] should rotation be required. Spoke with bedside RN, Getachew, regarding.   Continue dexamethasone 4mg IV BID [defer to primary Oncology for tapering]  Change duloxetine 30mg PO once daily HS--if pt tolerating x1 week [7/31/25], may increase dose to 60mg PO once daily HS   Continue pregabalin 75/75/150mg PO [TID spacing]  Change scheduled fentanyl 200mcg/h TD patch q72h [will likely require x2 100mcg/h TD patches to achieve recommended dose, please ensure both patches are newly placed at the same time to avoid lapses in patch expiration dates and pain control]  Continue cyclobenzaprine 5mg PO TID PRN for muscle  spasms  Continue acetaminophen 650mg PO q6h PRN for mild pain  Discontinue oxycodone IR 20mg PO q3h PRN for moderate pain   Discontinue oxycodone IR 30mg PO q3h PRN for severe pain   Per discussion with Myrna Oro MD via Epic chat this AM, primary team with plans to discontinue hydromorphone 2mg IV q2h PRN in anticipation for discharge   Start hydromorphone IR 4mg PO q3h PRN for moderate pain  Start hydromorphone IR 6mg PO q3h PRN for severe pain   May re-add hydromorphone 2mg IV q2h PRN for breakthrough pain or strict NPO if pain becomes uncontrolled after trialling today's recommendations  Continue to monitor pain scores and administer PRN medications as appropriate  Continue/initiate nonpharmacologic pain management strategies including ice/heat therapy, distraction techniques, deep breathing/relaxation techniques, calming music, and repositioning  Continue to monitor for signs of opioid efficacy (pain scores, improved functionality) and toxicity (pinpoint pupils, excess sedation/drowsiness/confusion, respiratory depression, etc.)     Nausea  Intermittent nausea and vomiting related to malignant disease process.  Home regimen: ondansetron PRN   EKG reviewed from 7/23/25, QTc 559.  PPI per primary--especially agree with while on steroids  dexamethasone will likely assist with   Continue Tigan 200mg IM q6h PRN for n/v, first line  See above recommendations for obtaining repeat EKG today. If QTc < 500, recommend:  Discontinue Tigan PRN   Restart home ondansetron 8mg PO q8h PRN for n/v, first line  May additionally consider adding prochlorperazine 10mg PO q6h PRN for n/v, second line  Would be ideal if primary team considering discharge that pt may be sent home with available PO anti-emetic regimen     Constipation  At risk for constipation related to opioids, pt endorsing feelings of mild constipation.  Usual bowel pattern: Every day  Home regimen: Miralax PRN, MOM PRN, Colace PRN  LBM: 7/23/25  Continue scheduled  Miralax 17g PO once daily [per pt/wife request]  Continue MOM 15ml PO once daily PRN for constipation  Continue Colace 100mg PO BID PRN for constipation [part of pt's home regimen]  Encouraged use of PRN bowel regimen, pt declining scheduling additional agents. Pt's wife stating she will remind him this afternoon of available PRN bowel regimen medications should he change his mind.   Goal to have BM without straining q48-72h, adjust regimen as needed  Encourage mobility as tolerated     Altered Mood  Acute on chronic anxiety related to health concerns.    Well controlled with current regimen.  Home regimen: lorazepam 0.5mg q6h PRN--written as lorazepam 1mg QID PRN due to insurance barriers per outpatient Palliative, though confirmed to be given 0.5mg q6h PRN [Apparently, there had been an insurance issue preventing prescription of 0.5mg dose as opposed to 1mg dose outpatient Palliative provider.]  duloxetine will also assist with   lorazepam 0.5mg PO QID PRN for anxiety   Spiritual Care, Art Therapy, Music Therapy, Pet Therapy, Acupuncture, and Integrative Oncology consulted     Sleeping Difficulty  Impaired sleep related to pain, anxiety, and hospital environment.  Home regimen: Home lorazepam  See pain and anxiety recs     Altered Nutrition  Altered nutrition related to recent n/v and malignant disease process.  Weight loss: 7lbs in last 2 weeks  Home regimen: None  Nutrition consulted, appreciate recs  dexamethasone will also assist with      Disposition:  Please start the process of having prior authorization with meds to beds deliver medications to patient prior to discharge via [facility] pharmacy. Prescriptions will need to be sent 48-72 hours prior to discharge so that a prior authorization can be completed.      Discharge date pending resolution of acute hospital issues and ongoing GOC.   Pt follows with Judy Zabala CNP, with [facility] for Palliative Care. Her office number is [phone], though she is off on  . Per wife, alternative Palliative provider, Hilda Solorzano CNP, covers weekends and can be reached those days with the same number.     SIGNATURE: GOLD Jain   PAGER/CONTACT:  Contact information:  Supportive and Palliative Oncology  Monday-Friday 8 AM-5 PM  Epic Secure chat or pager [phone].  After hours and weekends:  pager [phone]    =====================================================================================================    SUBJECTIVE:    Pain Assessment:  Location: Lower abdomen/pelvis, groin, bilateral thigh, bilateral ankle pain  Duration: Constant  Characteristics:  Ratin/10  Descriptors: Aching, sore, cramping  Aggravating: Movement   Relieving: Analgesics--see EMR [could use improvement per pt], positioning, modifying activity, and ICDs  Interference with Function: A little    Opioid Requirements  Past 24h opioid requirements:  (-, 9362-5261)  fentanyl 100mcg/h TD patch x 1 = 200 OME  hydromorphone 0.6mg IV x 1 = 7.5 OME  hydromorphone 2mg IV x 7 = 14mg IV = 175 OME  oxycodone IR 10mg x 1 = 12.5 OME   oxycodone IR 20mg x 1 = 25 OME   oxycodone IR 30mg x 4 = 120mg = 150 OME    Total 24h OME use: 570 OME    Symptom Assessment:  Nausea: none  Constipation: a little  Anxiety: a little--mild, surrounding medication changes    Information obtained from: chart review, interview of patient, interview of family, and discussion with primary team  ______________________________________________________________________     OBJECTIVE:    Lab Results   Component Value Date    WBC 17.0 (H) 2025    HGB 11.2 (L) 2025    HCT 31.8 (L) 2025    MCV 84 2025     2025     Lab Results   Component Value Date    GLUCOSE 238 (H) 2025    CALCIUM 7.2 (L) 2025     (L) 2025    K 3.5 2025    CO2 25 2025    CL 96 (L) 2025    BUN 18 2025    CREATININE 0.88 2025     Lab Results   Component Value Date    ALT  52 07/25/2025    AST 89 (H) 07/25/2025    ALKPHOS 1,014 (H) 07/25/2025    BILITOT 3.4 (H) 07/25/2025     Estimated Creatinine Clearance: 92.8 mL/min (by C-G formula based on SCr of 0.88 mg/dL).    Scheduled medications   Scheduled Medications[1]  Continuous medications  Continuous Medications[2]  PRN medications  acetaminophen, 650 mg, q6h PRN  albuterol, 1 puff, TID PRN  alteplase, 2 mg, PRN  cyclobenzaprine, 5 mg, TID PRN  docusate sodium, 100 mg, BID PRN  HYDROmorphone, 2 mg, q2h PRN  LORazepam, 0.5 mg, 4x daily PRN  magnesium hydroxide, 15 mL, Daily PRN  naloxone, 0.2 mg, q5 min PRN  oxyCODONE, 20 mg, q3h PRN  oxyCODONE, 30 mg, q3h PRN  polyethylene glycol, 17 g, Daily PRN  trimethobenzamide, 200 mg, q6h PRN    PHYSICAL EXAMINATION:    Vital Signs:   Vital signs reviewed  Visit Vitals  /90 (BP Location: Right arm, Patient Position: Lying)   Pulse 104   Temp 36.7 °C (98.1 °F) (Temporal)   Resp 16      0-10 (Numeric) Pain Score: 8     Physical Exam   Vitals reviewed.   Constitutional:       Comments: Alert, awake, ill appearing gentleman laying in bed. No signs of acute distress. Slow to respond, though remains pleasant, cooperative and participating in interview.  HENT:   Head:      Comments: Normocephalic, atraumatic.   Eyes:      Comments: Sclera clear, EOM intact, wearing glasses.    Pulmonary:      Comments: Symmetrical chest rise. Regular rate and depth of respirations. Room air.   Abdominal:      Comments: Abdomen mildly distended, lower abdomen/pelvis tender.   Musculoskeletal:      Comments: Generalized muscle weakness and atrophy. WILSON x4. No visible extremity edema.   Skin:     Comments: No lesions, rash, or abrasions present on visible skin. Skin color appropriate for ethnicity.   Neurological:      Comments: A&Ox3, intermittently endorses feelings of confusion, follows commands, no apparent sensory deficits.   Psychiatric:      Comments: Mood and behavior appropriate.    PALLIATIVE CARE  "ENCOUNTER:    Supportive and Palliative Oncology encounter:  Spoke with patient and his wife at bedside.  Emotional support provided.  Coordination of care: medication and symptom re-evaluation     Medical Decision Making/Goals of Care/Advance Care Planning:  (Per HAKEEM Benítez MD's, note on 7/24/25) Patient's current clinical condition, including diagnosis, prognosis, and management plan, and goals of care were discussed.   Life limiting disease: metastatic malignancy  Family: .  Lives with wife  Performance status: Moderate limitations due to pain independent with ADLs however needs assistance with IADLs  Joys/meaning/strength: Family  Understanding of health: Understands that he has pancreatic cancer with new mets to spine and plan for radiation to the chest and he was admitted because of nausea vomiting  Information: Wants full disclosure  Goals: States\" my goals do not hold good anymore since I might die soon\"  Worries and fears now and future: death   Code status discussion: Patient preferred to have this conversation tomorrow in the presence of wife     Advance Directives  Existence of Advance Directives: None  Decision maker: Surrogate decision maker is Nisha luz     =====================================================================================================    Signature and billing:  Medical complexity was high level due to due to complexity of problems, extensive data review, and high risk of management/treatment.    I spent 75 minutes in the care of this patient which included chart review, interviewing patient/family, discussion with primary team, coordination of care, and documentation.    Data:   Diagnostic tests and information reviewed for today's visit:  Conversation with primary team, Most recent labs and imaging results, Most recent EKG, Medications    Some elements copied from my note on 7/24/25, the elements have been updated and all reflect current decision making from today, " 07/25/25.    Plan of Care discussed with: Primary team, pt, pt's wife    Thank you for asking Supportive and Palliative Oncology to assist with care of this patient.  Recommendations will be communicated back to the consulting service by way of shared electronic medical record/secure chat/email or face-to-face.   We will continue to follow.  Please contact us for additional questions or concerns.    SIGNATURE: MARIBELL Jain-JIGNESH   PAGER/CONTACT:  Contact information:  Supportive and Palliative Oncology  Monday-Friday 8 AM-5 PM, Epic Secure chat or pager [phone].  After hours and weekends: pager [phone]         [1] dexAMETHasone, 4 mg, intravenous, BID  DULoxetine, 30 mg, oral, Daily  enoxaparin, 40 mg, subcutaneous, Daily  fenofibrate, 160 mg, oral, Daily  fentaNYL, 1 patch, transdermal, q72h  fluticasone, 1 spray, Each Nostril, Daily  fluticasone furoate-vilanteroL, 1 puff, inhalation, Daily  pantoprazole, 40 mg, oral, Daily before breakfast   Or  pantoprazole, 40 mg, intravenous, Daily before breakfast  polyethylene glycol, 17 g, oral, Daily  potassium chloride, 20 mEq, oral, Once  pregabalin, 150 mg, oral, Nightly  pregabalin, 75 mg, oral, BID  [2]

## 2025-07-25 NOTE — PROGRESS NOTES
Per the medical team, this patient has no anticipated discharge needs; full assessment deferred at this time. Will continue to follow for any home going needs that arise.   Maureen Rankin RN [facility]

## 2025-07-25 NOTE — CARE PLAN
The patient's goals for the shift include      The clinical goals for the shift include pain mgmt during shift    Over the shift, the patient did not make progress toward the following goals. Barriers to progression include . Recommendations to address these barriers include   Problem: Pain - Adult  Goal: Verbalizes/displays adequate comfort level or baseline comfort level  Outcome: Progressing     Problem: Safety - Adult  Goal: Free from fall injury  Outcome: Progressing     Problem: Discharge Planning  Goal: Discharge to home or other facility with appropriate resources  Outcome: Progressing     Problem: Chronic Conditions and Co-morbidities  Goal: Patient's chronic conditions and co-morbidity symptoms are monitored and maintained or improved  Outcome: Progressing     Problem: Nutrition  Goal: Nutrient intake appropriate for maintaining nutritional needs  Outcome: Progressing     Problem: Skin  Goal: Decreased wound size/increased tissue granulation at next dressing change  Outcome: Progressing  Goal: Participates in plan/prevention/treatment measures  Outcome: Progressing  Goal: Prevent/manage excess moisture  Outcome: Progressing  Goal: Prevent/minimize sheer/friction injuries  Outcome: Progressing  Goal: Promote/optimize nutrition  Outcome: Progressing  Goal: Promote skin healing  Outcome: Progressing     Problem: Pain - Adult  Goal: Verbalizes/displays adequate comfort level or baseline comfort level  Outcome: Progressing     Problem: Safety - Adult  Goal: Free from fall injury  Outcome: Progressing     Problem: Discharge Planning  Goal: Discharge to home or other facility with appropriate resources  Outcome: Progressing     Problem: Chronic Conditions and Co-morbidities  Goal: Patient's chronic conditions and co-morbidity symptoms are monitored and maintained or improved  Outcome: Progressing     Problem: Nutrition  Goal: Nutrient intake appropriate for maintaining nutritional needs  Outcome: Progressing      Problem: Skin  Goal: Decreased wound size/increased tissue granulation at next dressing change  Outcome: Progressing  Goal: Participates in plan/prevention/treatment measures  Outcome: Progressing  Goal: Prevent/manage excess moisture  Outcome: Progressing  Goal: Prevent/minimize sheer/friction injuries  Outcome: Progressing  Goal: Promote/optimize nutrition  Outcome: Progressing  Goal: Promote skin healing  Outcome: Progressing   .

## 2025-07-26 ENCOUNTER — PHARMACY VISIT (OUTPATIENT)
Dept: PHARMACY | Facility: CLINIC | Age: 55
End: 2025-07-26
Payer: COMMERCIAL

## 2025-07-26 VITALS
RESPIRATION RATE: 16 BRPM | BODY MASS INDEX: 25.46 KG/M2 | SYSTOLIC BLOOD PRESSURE: 149 MMHG | HEART RATE: 111 BPM | DIASTOLIC BLOOD PRESSURE: 91 MMHG | TEMPERATURE: 97.9 F | OXYGEN SATURATION: 93 % | HEIGHT: 68 IN | WEIGHT: 168 LBS

## 2025-07-26 LAB
ALBUMIN SERPL BCP-MCNC: 3 G/DL (ref 3.4–5)
ALP SERPL-CCNC: 1218 U/L (ref 33–120)
ALT SERPL W P-5'-P-CCNC: 75 U/L (ref 10–52)
ANION GAP SERPL CALC-SCNC: 14 MMOL/L (ref 10–20)
AST SERPL W P-5'-P-CCNC: 120 U/L (ref 9–39)
BASOPHILS # BLD AUTO: 0.02 X10*3/UL (ref 0–0.1)
BASOPHILS NFR BLD AUTO: 0.1 %
BILIRUB SERPL-MCNC: 4.2 MG/DL (ref 0–1.2)
BUN SERPL-MCNC: 21 MG/DL (ref 6–23)
CALCIUM SERPL-MCNC: 7.6 MG/DL (ref 8.6–10.6)
CHLORIDE SERPL-SCNC: 96 MMOL/L (ref 98–107)
CO2 SERPL-SCNC: 27 MMOL/L (ref 21–32)
CREAT SERPL-MCNC: 0.83 MG/DL (ref 0.5–1.3)
EGFRCR SERPLBLD CKD-EPI 2021: >90 ML/MIN/1.73M*2
EOSINOPHIL # BLD AUTO: 0 X10*3/UL (ref 0–0.7)
EOSINOPHIL NFR BLD AUTO: 0 %
ERYTHROCYTE [DISTWIDTH] IN BLOOD BY AUTOMATED COUNT: 16.5 % (ref 11.5–14.5)
GLUCOSE BLD MANUAL STRIP-MCNC: 327 MG/DL (ref 74–99)
GLUCOSE SERPL-MCNC: 172 MG/DL (ref 74–99)
HCT VFR BLD AUTO: 35.3 % (ref 41–52)
HGB BLD-MCNC: 11.8 G/DL (ref 13.5–17.5)
IMM GRANULOCYTES # BLD AUTO: 0.29 X10*3/UL (ref 0–0.7)
IMM GRANULOCYTES NFR BLD AUTO: 1.5 % (ref 0–0.9)
LYMPHOCYTES # BLD AUTO: 1.35 X10*3/UL (ref 1.2–4.8)
LYMPHOCYTES NFR BLD AUTO: 6.9 %
MAGNESIUM SERPL-MCNC: 2.73 MG/DL (ref 1.6–2.4)
MCH RBC QN AUTO: 29.5 PG (ref 26–34)
MCHC RBC AUTO-ENTMCNC: 33.4 G/DL (ref 32–36)
MCV RBC AUTO: 88 FL (ref 80–100)
MONOCYTES # BLD AUTO: 1.56 X10*3/UL (ref 0.1–1)
MONOCYTES NFR BLD AUTO: 8 %
NEUTROPHILS # BLD AUTO: 16.25 X10*3/UL (ref 1.2–7.7)
NEUTROPHILS NFR BLD AUTO: 83.5 %
NRBC BLD-RTO: 0.8 /100 WBCS (ref 0–0)
PHOSPHATE SERPL-MCNC: 2.8 MG/DL (ref 2.5–4.9)
PLATELET # BLD AUTO: 324 X10*3/UL (ref 150–450)
POTASSIUM SERPL-SCNC: 4.4 MMOL/L (ref 3.5–5.3)
PROT SERPL-MCNC: 6.3 G/DL (ref 6.4–8.2)
RBC # BLD AUTO: 4 X10*6/UL (ref 4.5–5.9)
SODIUM SERPL-SCNC: 133 MMOL/L (ref 136–145)
WBC # BLD AUTO: 19.5 X10*3/UL (ref 4.4–11.3)

## 2025-07-26 PROCEDURE — 2500000002 HC RX 250 W HCPCS SELF ADMINISTERED DRUGS (ALT 637 FOR MEDICARE OP, ALT 636 FOR OP/ED)

## 2025-07-26 PROCEDURE — 83735 ASSAY OF MAGNESIUM: CPT

## 2025-07-26 PROCEDURE — RXMED WILLOW AMBULATORY MEDICATION CHARGE

## 2025-07-26 PROCEDURE — 82947 ASSAY GLUCOSE BLOOD QUANT: CPT

## 2025-07-26 PROCEDURE — 2500000001 HC RX 250 WO HCPCS SELF ADMINISTERED DRUGS (ALT 637 FOR MEDICARE OP)

## 2025-07-26 PROCEDURE — 2500000004 HC RX 250 GENERAL PHARMACY W/ HCPCS (ALT 636 FOR OP/ED)

## 2025-07-26 PROCEDURE — 94640 AIRWAY INHALATION TREATMENT: CPT

## 2025-07-26 PROCEDURE — 84100 ASSAY OF PHOSPHORUS: CPT

## 2025-07-26 PROCEDURE — 99238 HOSP IP/OBS DSCHRG MGMT 30/<: CPT

## 2025-07-26 PROCEDURE — 85025 COMPLETE CBC W/AUTO DIFF WBC: CPT

## 2025-07-26 PROCEDURE — 80053 COMPREHEN METABOLIC PANEL: CPT

## 2025-07-26 RX ORDER — NALOXONE HYDROCHLORIDE 4 MG/.1ML
4 SPRAY NASAL AS NEEDED
Qty: 2 EACH | Refills: 11 | Status: SHIPPED | OUTPATIENT
Start: 2025-07-26 | End: 2025-08-07

## 2025-07-26 RX ORDER — PREGABALIN 150 MG/1
150 CAPSULE ORAL NIGHTLY
Qty: 14 CAPSULE | Refills: 0 | Status: SHIPPED | OUTPATIENT
Start: 2025-07-26 | End: 2025-08-09

## 2025-07-26 RX ORDER — CYCLOBENZAPRINE HCL 5 MG
5 TABLET ORAL 3 TIMES DAILY PRN
Qty: 42 TABLET | Refills: 0 | Status: SHIPPED | OUTPATIENT
Start: 2025-07-26 | End: 2025-08-09

## 2025-07-26 RX ORDER — HYDROMORPHONE HYDROCHLORIDE 2 MG/1
6 TABLET ORAL
Qty: 10 TABLET | Refills: 0 | Status: SHIPPED | OUTPATIENT
Start: 2025-07-26 | End: 2025-07-26

## 2025-07-26 RX ORDER — ACETAMINOPHEN 325 MG/1
650 TABLET ORAL EVERY 6 HOURS PRN
Qty: 30 TABLET | Refills: 0 | Status: SHIPPED | OUTPATIENT
Start: 2025-07-26

## 2025-07-26 RX ORDER — FENTANYL 100 UG/H
2 PATCH TRANSDERMAL
Qty: 10 PATCH | Refills: 0 | Status: SHIPPED | OUTPATIENT
Start: 2025-07-28 | End: 2025-08-07

## 2025-07-26 RX ORDER — ONDANSETRON 8 MG/1
8 TABLET, FILM COATED ORAL EVERY 8 HOURS PRN
Qty: 20 TABLET | Refills: 0 | Status: SHIPPED | OUTPATIENT
Start: 2025-07-26 | End: 2025-08-09

## 2025-07-26 RX ORDER — PREGABALIN 75 MG/1
75 CAPSULE ORAL 2 TIMES DAILY
Qty: 28 CAPSULE | Refills: 0 | Status: SHIPPED | OUTPATIENT
Start: 2025-07-26 | End: 2025-08-09

## 2025-07-26 RX ORDER — PANTOPRAZOLE SODIUM 40 MG/1
40 TABLET, DELAYED RELEASE ORAL
Qty: 14 TABLET | Refills: 0 | Status: SHIPPED | OUTPATIENT
Start: 2025-07-27 | End: 2025-08-10

## 2025-07-26 RX ORDER — HYDROMORPHONE HYDROCHLORIDE 4 MG/1
4 TABLET ORAL
Qty: 10 TABLET | Refills: 0 | Status: SHIPPED | OUTPATIENT
Start: 2025-07-26 | End: 2025-07-26

## 2025-07-26 RX ORDER — HYDROMORPHONE HYDROCHLORIDE 4 MG/1
4 TABLET ORAL
Qty: 40 TABLET | Refills: 0 | Status: SHIPPED | OUTPATIENT
Start: 2025-07-26 | End: 2025-08-02

## 2025-07-26 RX ORDER — FLUTICASONE FUROATE AND VILANTEROL 200; 25 UG/1; UG/1
1 POWDER RESPIRATORY (INHALATION)
Qty: 60 EACH | Refills: 0 | Status: SHIPPED | OUTPATIENT
Start: 2025-07-27 | End: 2025-07-26

## 2025-07-26 RX ORDER — DULOXETIN HYDROCHLORIDE 30 MG/1
30 CAPSULE, DELAYED RELEASE ORAL NIGHTLY
Qty: 14 CAPSULE | Refills: 0 | Status: SHIPPED | OUTPATIENT
Start: 2025-07-26 | End: 2025-08-09

## 2025-07-26 RX ORDER — DOCUSATE SODIUM 100 MG/1
100 CAPSULE, LIQUID FILLED ORAL 2 TIMES DAILY PRN
Qty: 60 CAPSULE | Refills: 0 | Status: SHIPPED | OUTPATIENT
Start: 2025-07-26

## 2025-07-26 RX ORDER — HYDROMORPHONE HYDROCHLORIDE 2 MG/1
6 TABLET ORAL
Qty: 336 TABLET | Refills: 0 | Status: SHIPPED | OUTPATIENT
Start: 2025-07-26 | End: 2025-07-26

## 2025-07-26 RX ORDER — FLUTICASONE FUROATE AND VILANTEROL 200; 25 UG/1; UG/1
1 POWDER RESPIRATORY (INHALATION)
Qty: 60 EACH | Refills: 0 | Status: SHIPPED | OUTPATIENT
Start: 2025-07-27 | End: 2025-08-09

## 2025-07-26 RX ORDER — HYDROMORPHONE HYDROCHLORIDE 4 MG/1
4 TABLET ORAL
Qty: 112 TABLET | Refills: 0 | Status: SHIPPED | OUTPATIENT
Start: 2025-07-26 | End: 2025-07-26

## 2025-07-26 RX ORDER — HYDROMORPHONE HYDROCHLORIDE 2 MG/1
6 TABLET ORAL
Qty: 72 TABLET | Refills: 0 | Status: SHIPPED | OUTPATIENT
Start: 2025-07-26 | End: 2025-08-07

## 2025-07-26 RX ADMIN — ACETAMINOPHEN 650 MG: 325 TABLET ORAL at 08:19

## 2025-07-26 RX ADMIN — HYDROMORPHONE HYDROCHLORIDE 6 MG: 4 TABLET ORAL at 05:54

## 2025-07-26 RX ADMIN — INSULIN LISPRO 1 UNITS: 100 INJECTION, SOLUTION INTRAVENOUS; SUBCUTANEOUS at 08:21

## 2025-07-26 RX ADMIN — PREGABALIN 75 MG: 75 CAPSULE ORAL at 15:39

## 2025-07-26 RX ADMIN — FENOFIBRATE 160 MG: 160 TABLET ORAL at 08:19

## 2025-07-26 RX ADMIN — PANTOPRAZOLE SODIUM 40 MG: 40 TABLET, DELAYED RELEASE ORAL at 06:00

## 2025-07-26 RX ADMIN — DEXAMETHASONE SODIUM PHOSPHATE 4 MG: 4 INJECTION, SOLUTION INTRA-ARTICULAR; INTRALESIONAL; INTRAMUSCULAR; INTRAVENOUS; SOFT TISSUE at 08:19

## 2025-07-26 RX ADMIN — HYDROMORPHONE HYDROCHLORIDE 6 MG: 4 TABLET ORAL at 00:54

## 2025-07-26 RX ADMIN — INSULIN LISPRO 5 UNITS: 100 INJECTION, SOLUTION INTRAVENOUS; SUBCUTANEOUS at 10:28

## 2025-07-26 RX ADMIN — HYDROMORPHONE HYDROCHLORIDE 6 MG: 4 TABLET ORAL at 15:39

## 2025-07-26 RX ADMIN — FLUTICASONE FUROATE AND VILANTEROL TRIFENATATE 1 PUFF: 200; 25 POWDER RESPIRATORY (INHALATION) at 08:19

## 2025-07-26 RX ADMIN — FLUTICASONE PROPIONATE 1 SPRAY: 50 SPRAY, METERED NASAL at 08:19

## 2025-07-26 RX ADMIN — PREGABALIN 75 MG: 75 CAPSULE ORAL at 08:19

## 2025-07-26 RX ADMIN — POLYETHYLENE GLYCOL 3350 17 G: 17 POWDER, FOR SOLUTION ORAL at 08:19

## 2025-07-26 RX ADMIN — CYCLOBENZAPRINE 5 MG: 10 TABLET, FILM COATED ORAL at 08:20

## 2025-07-26 RX ADMIN — HYDROMORPHONE HYDROCHLORIDE 6 MG: 4 TABLET ORAL at 10:29

## 2025-07-26 ASSESSMENT — PAIN - FUNCTIONAL ASSESSMENT
PAIN_FUNCTIONAL_ASSESSMENT: 0-10

## 2025-07-26 ASSESSMENT — PAIN SCALES - GENERAL
PAINLEVEL_OUTOF10: 0 - NO PAIN
PAINLEVEL_OUTOF10: 7
PAINLEVEL_OUTOF10: 0 - NO PAIN
PAINLEVEL_OUTOF10: 10 - WORST POSSIBLE PAIN
PAINLEVEL_OUTOF10: 2
PAINLEVEL_OUTOF10: 3
PAINLEVEL_OUTOF10: 7

## 2025-07-26 ASSESSMENT — PAIN DESCRIPTION - DESCRIPTORS
DESCRIPTORS: ACHING;PENETRATING;RADIATING
DESCRIPTORS: STABBING

## 2025-07-26 ASSESSMENT — COGNITIVE AND FUNCTIONAL STATUS - GENERAL
WALKING IN HOSPITAL ROOM: A LITTLE
CLIMB 3 TO 5 STEPS WITH RAILING: A LITTLE
MOBILITY SCORE: 22
DRESSING REGULAR LOWER BODY CLOTHING: A LITTLE
DAILY ACTIVITIY SCORE: 23

## 2025-07-26 ASSESSMENT — PAIN DESCRIPTION - LOCATION
LOCATION: BACK
LOCATION: BACK

## 2025-07-26 NOTE — HOSPITAL COURSE
Deshawn Foley is a 54 y.o. male with a PMH of metastatic pancreatic adenocarcinoma (with mets), HLD, anxiety, and asthma who is presenting with nausea, vomiting, diarrhea in the setting of metastatic pancreatic adenocarcinoma with multifocal progression.       Patient states he had one day of severe intractable nausea, vomiting, diarrhea, and intolerable pain. Additionally, pt states he was not able to tolerate any oral intake which prompted him to be admitted.     Upon admission, pt's labs were significant for hypokalemia (2.9), hypophosphatemia (2.3), and elevated LFTs. [place] [facility] [place]'s [place], vomiting, and diarrhea was under control. His electrolytes were replenished and got a supportive oncology consult to optimize pain regimen.    On day of discharge, patient is stable and has optimized pain control. Patient is able to tolerate oral solids and liquids. Additionally, patient states his pain is tolerable and states he will be able to be at home on this regimen.     Patient has follow up appointments with Dr. Nayak (08/14/2025) and has infusion appointment next week (07/28/2025)

## 2025-07-26 NOTE — DISCHARGE SUMMARY
Discharge Diagnosis  Vomiting    Malnutrition Diagnosis Status: New  Malnutrition Diagnosis: Severe malnutrition related to acute disease or injury     As Evidenced by: <50% energy needs for >/= 5 days,  I agree with the dietitian's malnutrition diagnosis.        Issues Requiring Follow-Up  - Radiation oncology scheduling outpatient    Discharge Meds     Medication List      START taking these medications     cyclobenzaprine 5 mg tablet; Commonly known as: Flexeril; Take 1 tablet   (5 mg) by mouth 3 times a day as needed for muscle spasms for up to 14   days.   docusate sodium 100 mg capsule; Commonly known as: Colace; Take 1   capsule (100 mg) by mouth 2 times a day as needed for constipation.   DULoxetine 30 mg DR capsule; Commonly known as: Cymbalta; Take 1 capsule   (30 mg) by mouth once daily at bedtime for 14 days. Do not crush or chew.   fluticasone furoate-vilanteroL 200-25 mcg/dose inhaler; Commonly known   as: Breo Ellipta; Inhale 1 puff once daily.; Start taking on: July 27, 2025; Replaces: Advair Diskus 250-50 mcg/dose diskus inhaler   * HYDROmorphone 4 mg tablet; Commonly known as: Dilaudid; Take 1 tablet   (4 mg) by mouth every 3 hours if needed for moderate pain (4 - 6) for up   to 14 days.   * HYDROmorphone 2 mg tablet; Commonly known as: Dilaudid; Take 3 tablets   (6 mg) by mouth every 3 hours if needed for severe pain (7 - 10) for up to   14 days.   ondansetron 8 mg tablet; Commonly known as: Zofran; Take 1 tablet (8 mg)   by mouth every 8 hours if needed for nausea or vomiting for up to 14 days.   pantoprazole 40 mg EC tablet; Commonly known as: ProtoNix; Take 1 tablet   (40 mg) by mouth once daily in the morning. Take before meals for 14 days.   Do not crush, chew, or split.; Start taking on: July 27, 2025  * This list has 2 medication(s) that are the same as other medications   prescribed for you. Read the directions carefully, and ask your doctor or   other care provider to review them with  you.     CHANGE how you take these medications     acetaminophen 325 mg tablet; Commonly known as: Tylenol; Take 2 tablets   (650 mg) by mouth every 6 hours if needed for mild pain (1 - 3).; What   changed: when to take this, reasons to take this   fentaNYL 100 mcg/hr patch; Commonly known as: Duragesic; Place 2 patches   over 72 hours on the skin every 3rd day for 14 days.; Start taking on:   July 28, 2025; What changed: how much to take   * pregabalin 150 mg capsule; Commonly known as: Lyrica; Take 1 capsule   (150 mg) by mouth once daily at bedtime for 14 days.; What changed:   medication strength, how much to take, when to take this   * pregabalin 75 mg capsule; Commonly known as: Lyrica; Take 1 capsule   (75 mg) by mouth 2 times a day for 14 days.; What changed: You were   already taking a medication with the same name, and this prescription was   added. Make sure you understand how and when to take each.  * This list has 2 medication(s) that are the same as other medications   prescribed for you. Read the directions carefully, and ask your doctor or   other care provider to review them with you.     CONTINUE taking these medications     albuterol 90 mcg/actuation inhaler   diphenoxylate-atropine 2.5-0.025 mg tablet; Commonly known as: Lomotil;   Take 1 tablet by mouth 4 times a day as needed for diarrhea.   fluticasone 50 mcg/actuation nasal spray; Commonly known as: Flonase   ibuprofen 200 mg tablet   Imodium A-D 2 mg capsule; Generic drug: loperamide   LORazepam 1 mg tablet; Commonly known as: Ativan   magnesium hydroxide 400 mg/5 mL suspension; Commonly known as: Milk of   Magnesia   polyethylene glycol 17 gram packet; Commonly known as: Glycolax, Miralax     STOP taking these medications     Advair Diskus 250-50 mcg/dose diskus inhaler; Generic drug: fluticasone   propion-salmeteroL; Replaced by: fluticasone furoate-vilanteroL 200-25   mcg/dose inhaler   amLODIPine 10 mg tablet; Commonly known as:  Norvasc   fenofibrate 160 mg tablet; Commonly known as: Triglide   fexofenadine 180 mg tablet; Commonly known as: Allegra   ondansetron ODT 8 mg disintegrating tablet; Commonly known as:   Zofran-ODT   oxyCODONE 20 mg immediate release tablet; Commonly known as: Roxicodone   prochlorperazine 10 mg tablet; Commonly known as: Compazine   rosuvastatin 40 mg tablet; Commonly known as: Crestor       Test Results Pending At Discharge  Pending Labs       No current pending labs.            Hospital Course  Deshawn Foley is a 54 y.o. male with a PMH of metastatic pancreatic adenocarcinoma (with mets), HLD, anxiety, and asthma who is presenting with nausea, vomiting, diarrhea in the setting of metastatic pancreatic adenocarcinoma with multifocal progression.       Patient states he had one day of severe intractable nausea, vomiting, diarrhea, and intolerable pain. Additionally, pt states he was not able to tolerate any oral intake which prompted him to be admitted.     Upon admission, pt's labs were significant for hypokalemia (2.9), hypophosphatemia (2.3), and elevated LFTs. Upon arrival to [facility] [place], pt's nausea, vomiting, and diarrhea was under control. His electrolytes were replenished and got a supportive oncology consult to optimize pain regimen.    On day of discharge, patient is stable and has optimized pain control. Patient is able to tolerate oral solids and liquids. Additionally, patient states his pain is tolerable and states he will be able to be at home on this regimen.     Patient has follow up appointments with Dr. Nayak (08/14/2025) and has infusion appointment next week (07/28/2025)    Pertinent Physical Exam At Time of Discharge  Physical Exam  HENT:      Head: Normocephalic.     Eyes:      Pupils: Pupils are equal, round, and reactive to light.       Cardiovascular:      Rate and Rhythm: Normal rate and regular rhythm.   Pulmonary:      Effort: Pulmonary effort is normal.     Skin:     General: Skin is warm.      Neurological:      General: No focal deficit present.      Mental Status: He is alert.         Outpatient Follow-Up  Future Appointments   Date Time Provider Department Center   7/28/2025 10:00 AM INF 03 [facility] [id number] [facility]   8/14/2025  2:20 PM Duncan Nayak MD [id number] [facility]   8/15/2025  2:00 PM INF 02 CAITY [id number] [facility]   11/17/2025  8:00 AM Cassia Antony MD [id number] [facility]         Myrna Oro MD

## 2025-07-26 NOTE — CARE PLAN
The patient's goals for the shift include      The clinical goals for the shift include pain control    Over the shift, the patient did not make progress toward the following goals. Barriers to progression include   Problem: Pain - Adult  Goal: Verbalizes/displays adequate comfort level or baseline comfort level  7/26/2025 1206 by Getachew Arriaga RN  Outcome: Progressing  7/26/2025 1205 by Getachew Arriaga RN  Outcome: Progressing     Problem: Safety - Adult  Goal: Free from fall injury  7/26/2025 1206 by Getachew Arriaga RN  Outcome: Progressing  7/26/2025 1205 by Getachew Arriaga RN  Outcome: Progressing     Problem: Discharge Planning  Goal: Discharge to home or other facility with appropriate resources  7/26/2025 1206 by Getachew Arriaga RN  Outcome: Progressing  7/26/2025 1205 by Getachew Arriaga RN  Outcome: Progressing     Problem: Chronic Conditions and Co-morbidities  Goal: Patient's chronic conditions and co-morbidity symptoms are monitored and maintained or improved  7/26/2025 1206 by Getachew Arriaga RN  Outcome: Progressing  7/26/2025 1205 by Getachew Arriaag RN  Outcome: Progressing     Problem: Nutrition  Goal: Nutrient intake appropriate for maintaining nutritional needs  7/26/2025 1206 by Getachew Arriaga RN  Outcome: Progressing  7/26/2025 1205 by Getachew Arriaga RN  Outcome: Progressing     Problem: Skin  Goal: Decreased wound size/increased tissue granulation at next dressing change  7/26/2025 1206 by Getachew Arriaga RN  Outcome: Progressing  7/26/2025 1205 by Getachew Arriaga RN  Outcome: Progressing  Goal: Participates in plan/prevention/treatment measures  7/26/2025 1206 by Getachew Arriaga RN  Outcome: Progressing  7/26/2025 1205 by Getachew Arriaga RN  Outcome: Progressing  Goal: Prevent/manage excess moisture  7/26/2025 1206 by Getachew Arriaga RN  Outcome: Progressing  7/26/2025 1205 by Getachew Arriaga RN  Outcome: Progressing  Goal: Prevent/minimize sheer/friction  injuries  7/26/2025 1206 by Getachew Arriaga RN  Outcome: Progressing  7/26/2025 1205 by Getachew Arriaga RN  Outcome: Progressing  Goal: Promote/optimize nutrition  7/26/2025 1206 by Getachew Arriaga RN  Outcome: Progressing  7/26/2025 1205 by Getachew Arriaga RN  Outcome: Progressing  Goal: Promote skin healing  7/26/2025 1206 by Getachew Arriaga RN  Outcome: Progressing  7/26/2025 1205 by Getachew Arriaga RN  Outcome: Progressing     Problem: Skin  Goal: Decreased wound size/increased tissue granulation at next dressing change  7/26/2025 1206 by Getachew Arriaga RN  Outcome: Progressing  7/26/2025 1205 by Getachew Arriaga RN  Outcome: Progressing  Goal: Participates in plan/prevention/treatment measures  7/26/2025 1206 by Getachew Arriaga RN  Outcome: Progressing  7/26/2025 1205 by Getachew Arriaga RN  Outcome: Progressing  Goal: Prevent/manage excess moisture  7/26/2025 1206 by Getachew Arriaga RN  Outcome: Progressing  7/26/2025 1205 by Getachew Arriaga RN  Outcome: Progressing  Goal: Prevent/minimize sheer/friction injuries  7/26/2025 1206 by Getachew Arriaga RN  Outcome: Progressing  7/26/2025 1205 by Getachew Arriaga RN  Outcome: Progressing  Goal: Promote/optimize nutrition  7/26/2025 1206 by Getachew Arriaga RN  Outcome: Progressing  7/26/2025 1205 by Getachew Arriaga RN  Outcome: Progressing  Goal: Promote skin healing  7/26/2025 1206 by Getachew Arriaga RN  Outcome: Progressing  7/26/2025 1205 by Getachew Arriaga RN  Outcome: Progressing     Problem: Nutrition  Goal: Nutrient intake appropriate for maintaining nutritional needs  7/26/2025 1206 by Getachew Arriaga RN  Outcome: Progressing  7/26/2025 1205 by Getachew Arriaga RN  Outcome: Progressing     Problem: Chronic Conditions and Co-morbidities  Goal: Patient's chronic conditions and co-morbidity symptoms are monitored and maintained or improved  7/26/2025 1206 by Getachew Arriaga RN  Outcome: Progressing  7/26/2025 1205 by Getachew  GRACIELA Arriaga  Outcome: Progressing     Problem: Discharge Planning  Goal: Discharge to home or other facility with appropriate resources  7/26/2025 1206 by Getachew Arriaga RN  Outcome: Progressing  7/26/2025 1205 by Getachew Arriaga RN  Outcome: Progressing     Problem: Safety - Adult  Goal: Free from fall injury  7/26/2025 1206 by Getachew Arriaga RN  Outcome: Progressing  7/26/2025 1205 by Getachew Arriaga RN  Outcome: Progressing     Problem: Pain - Adult  Goal: Verbalizes/displays adequate comfort level or baseline comfort level  7/26/2025 1206 by Getachew Arriaga RN  Outcome: Progressing  7/26/2025 1205 by Getachew Arriaga RN  Outcome: Progressing   . Recommendations to address these barriers include .

## 2025-07-26 NOTE — CARE PLAN
The patient's goals for the shift include      The clinical goals for the shift include pain control    Over the shift, the patient did not make progress toward the following goals. Barriers to progression include. Recommendations to address these barriers include.    Problem: Pain - Adult  Goal: Verbalizes/displays adequate comfort level or baseline comfort level  Outcome: Progressing     Problem: Safety - Adult  Goal: Free from fall injury  Outcome: Progressing     Problem: Discharge Planning  Goal: Discharge to home or other facility with appropriate resources  Outcome: Progressing     Problem: Chronic Conditions and Co-morbidities  Goal: Patient's chronic conditions and co-morbidity symptoms are monitored and maintained or improved  Outcome: Progressing     Problem: Nutrition  Goal: Nutrient intake appropriate for maintaining nutritional needs  Outcome: Progressing     Problem: Skin  Goal: Decreased wound size/increased tissue granulation at next dressing change  Outcome: Progressing  Goal: Participates in plan/prevention/treatment measures  Outcome: Progressing  Goal: Prevent/manage excess moisture  Outcome: Progressing  Goal: Prevent/minimize sheer/friction injuries  Outcome: Progressing  Goal: Promote/optimize nutrition  Outcome: Progressing  Goal: Promote skin healing  Outcome: Progressing

## 2025-07-26 NOTE — CARE PLAN
The patient's goals for the shift include      The clinical goals for the shift include pain control    Over the shift, the patient did not make progress toward the following goals. Barriers to progression include . Recommendations to address these barriers include   Problem: Pain - Adult  Goal: Verbalizes/displays adequate comfort level or baseline comfort level  Outcome: Progressing     Problem: Safety - Adult  Goal: Free from fall injury  Outcome: Progressing     Problem: Discharge Planning  Goal: Discharge to home or other facility with appropriate resources  Outcome: Progressing     Problem: Chronic Conditions and Co-morbidities  Goal: Patient's chronic conditions and co-morbidity symptoms are monitored and maintained or improved  Outcome: Progressing     Problem: Nutrition  Goal: Nutrient intake appropriate for maintaining nutritional needs  Outcome: Progressing     Problem: Skin  Goal: Decreased wound size/increased tissue granulation at next dressing change  Outcome: Progressing  Goal: Participates in plan/prevention/treatment measures  Outcome: Progressing  Goal: Prevent/manage excess moisture  Outcome: Progressing  Goal: Prevent/minimize sheer/friction injuries  Outcome: Progressing  Goal: Promote/optimize nutrition  Outcome: Progressing  Goal: Promote skin healing  Outcome: Progressing     Problem: Pain - Adult  Goal: Verbalizes/displays adequate comfort level or baseline comfort level  Outcome: Progressing     Problem: Safety - Adult  Goal: Free from fall injury  Outcome: Progressing     Problem: Discharge Planning  Goal: Discharge to home or other facility with appropriate resources  Outcome: Progressing     Problem: Chronic Conditions and Co-morbidities  Goal: Patient's chronic conditions and co-morbidity symptoms are monitored and maintained or improved  Outcome: Progressing     Problem: Nutrition  Goal: Nutrient intake appropriate for maintaining nutritional needs  Outcome: Progressing     Problem:  Skin  Goal: Decreased wound size/increased tissue granulation at next dressing change  Outcome: Progressing  Goal: Participates in plan/prevention/treatment measures  Outcome: Progressing  Goal: Prevent/manage excess moisture  Outcome: Progressing  Goal: Prevent/minimize sheer/friction injuries  Outcome: Progressing  Goal: Promote/optimize nutrition  Outcome: Progressing  Goal: Promote skin healing  Outcome: Progressing   .

## 2025-07-28 ENCOUNTER — INFUSION (OUTPATIENT)
Dept: HEMATOLOGY/ONCOLOGY | Facility: CLINIC | Age: 55
End: 2025-07-28
Payer: MEDICARE

## 2025-07-28 ENCOUNTER — APPOINTMENT (OUTPATIENT)
Dept: HEMATOLOGY/ONCOLOGY | Facility: CLINIC | Age: 55
End: 2025-07-28
Payer: MEDICARE

## 2025-07-28 ENCOUNTER — TELEPHONE (OUTPATIENT)
Dept: ADMISSION | Facility: HOSPITAL | Age: 55
End: 2025-07-28

## 2025-07-28 ENCOUNTER — DOCUMENTATION (OUTPATIENT)
Dept: HEMATOLOGY/ONCOLOGY | Facility: CLINIC | Age: 55
End: 2025-07-28

## 2025-07-28 VITALS
OXYGEN SATURATION: 94 % | TEMPERATURE: 97.3 F | RESPIRATION RATE: 20 BRPM | HEART RATE: 112 BPM | DIASTOLIC BLOOD PRESSURE: 86 MMHG | SYSTOLIC BLOOD PRESSURE: 112 MMHG | WEIGHT: 175.04 LBS | BODY MASS INDEX: 26.62 KG/M2

## 2025-07-28 DIAGNOSIS — C78.7 MALIGNANT NEOPLASM METASTATIC TO LIVER (MULTI): ICD-10-CM

## 2025-07-28 DIAGNOSIS — C25.9 MALIGNANT NEOPLASM OF PANCREAS, UNSPECIFIED LOCATION OF MALIGNANCY (MULTI): ICD-10-CM

## 2025-07-28 LAB
BASOPHILS # BLD AUTO: 0.01 X10*3/UL (ref 0–0.1)
BASOPHILS NFR BLD AUTO: 0.1 %
EOSINOPHIL # BLD AUTO: 0.06 X10*3/UL (ref 0–0.7)
EOSINOPHIL NFR BLD AUTO: 0.4 %
ERYTHROCYTE [DISTWIDTH] IN BLOOD BY AUTOMATED COUNT: 18.8 % (ref 11.5–14.5)
HCT VFR BLD AUTO: 36.7 % (ref 41–52)
HGB BLD-MCNC: 12.2 G/DL (ref 13.5–17.5)
IMM GRANULOCYTES # BLD AUTO: 0.17 X10*3/UL (ref 0–0.7)
IMM GRANULOCYTES NFR BLD AUTO: 1 % (ref 0–0.9)
LYMPHOCYTES # BLD AUTO: 1.74 X10*3/UL (ref 1.2–4.8)
LYMPHOCYTES NFR BLD AUTO: 10.6 %
MCH RBC QN AUTO: 30 PG (ref 26–34)
MCHC RBC AUTO-ENTMCNC: 33.2 G/DL (ref 32–36)
MCV RBC AUTO: 90 FL (ref 80–100)
MONOCYTES # BLD AUTO: 1.22 X10*3/UL (ref 0.1–1)
MONOCYTES NFR BLD AUTO: 7.5 %
NEUTROPHILS # BLD AUTO: 13.14 X10*3/UL (ref 1.2–7.7)
NEUTROPHILS NFR BLD AUTO: 80.4 %
NRBC BLD-RTO: ABNORMAL /100{WBCS}
PLATELET # BLD AUTO: 274 X10*3/UL (ref 150–450)
RBC # BLD AUTO: 4.07 X10*6/UL (ref 4.5–5.9)
WBC # BLD AUTO: 16.3 X10*3/UL (ref 4.4–11.3)

## 2025-07-28 PROCEDURE — 2500000004 HC RX 250 GENERAL PHARMACY W/ HCPCS (ALT 636 FOR OP/ED): Performed by: INTERNAL MEDICINE

## 2025-07-28 PROCEDURE — 80053 COMPREHEN METABOLIC PANEL: CPT

## 2025-07-28 PROCEDURE — 85025 COMPLETE CBC W/AUTO DIFF WBC: CPT

## 2025-07-28 PROCEDURE — 96413 CHEMO IV INFUSION 1 HR: CPT

## 2025-07-28 RX ORDER — ALBUTEROL SULFATE 0.83 MG/ML
3 SOLUTION RESPIRATORY (INHALATION) AS NEEDED
Status: DISCONTINUED | OUTPATIENT
Start: 2025-07-28 | End: 2025-07-28 | Stop reason: HOSPADM

## 2025-07-28 RX ORDER — HEPARIN 100 UNIT/ML
500 SYRINGE INTRAVENOUS AS NEEDED
OUTPATIENT
Start: 2025-07-28

## 2025-07-28 RX ORDER — HEPARIN SODIUM,PORCINE/PF 10 UNIT/ML
50 SYRINGE (ML) INTRAVENOUS AS NEEDED
OUTPATIENT
Start: 2025-07-28

## 2025-07-28 RX ORDER — DIPHENHYDRAMINE HYDROCHLORIDE 50 MG/ML
50 INJECTION, SOLUTION INTRAMUSCULAR; INTRAVENOUS AS NEEDED
Status: DISCONTINUED | OUTPATIENT
Start: 2025-07-28 | End: 2025-07-28 | Stop reason: HOSPADM

## 2025-07-28 RX ORDER — FAMOTIDINE 10 MG/ML
20 INJECTION, SOLUTION INTRAVENOUS ONCE AS NEEDED
Status: DISCONTINUED | OUTPATIENT
Start: 2025-07-28 | End: 2025-07-28 | Stop reason: HOSPADM

## 2025-07-28 RX ORDER — PROCHLORPERAZINE MALEATE 10 MG
10 TABLET ORAL EVERY 6 HOURS PRN
Status: DISCONTINUED | OUTPATIENT
Start: 2025-07-28 | End: 2025-07-28 | Stop reason: HOSPADM

## 2025-07-28 RX ORDER — PROCHLORPERAZINE EDISYLATE 5 MG/ML
10 INJECTION INTRAMUSCULAR; INTRAVENOUS EVERY 6 HOURS PRN
Status: DISCONTINUED | OUTPATIENT
Start: 2025-07-28 | End: 2025-07-28 | Stop reason: HOSPADM

## 2025-07-28 RX ORDER — EPINEPHRINE 0.3 MG/.3ML
0.3 INJECTION SUBCUTANEOUS EVERY 5 MIN PRN
Status: DISCONTINUED | OUTPATIENT
Start: 2025-07-28 | End: 2025-07-28 | Stop reason: HOSPADM

## 2025-07-28 RX ORDER — HEPARIN 100 UNIT/ML
500 SYRINGE INTRAVENOUS AS NEEDED
Status: DISCONTINUED | OUTPATIENT
Start: 2025-07-28 | End: 2025-07-28 | Stop reason: HOSPADM

## 2025-07-28 RX ADMIN — SODIUM CHLORIDE 200 MG: 9 INJECTION, SOLUTION INTRAVENOUS at 11:31

## 2025-07-28 RX ADMIN — HEPARIN 500 UNITS: 100 SYRINGE at 12:11

## 2025-07-28 ASSESSMENT — PAIN SCALES - GENERAL
PAINLEVEL_OUTOF10: 8

## 2025-07-28 NOTE — TELEPHONE ENCOUNTER
Pt's wife, Nisha, called, stating that Dr. Nayak had called her 's phone.  He is currently in infusion.  Nisha is requesting that Dr. Nayak call her back (not her ) to relay any information.  Nisha states that her 's brain is working as well after chemo.      Nisha's phone: [phone]

## 2025-07-28 NOTE — PROGRESS NOTES
Patient presented for treatment today. Diaphoretic and pale. Vitals 112/-44-36.3 Diaphoresis since at least Friday. WBC elevated at 19. Pulse ox 94 room air. LFT's noted.  Message sent to Dr. Nayak.  Fine red rash noted on upper chest, shoulders and upper bilat arms.  Message to team. See orders cosigned for okay to treat for all above. Patient tolerated infusion without incident.  Patient remains diaphoretic upon discharge.  Denies dizziness or lightheadedness.  Port deaccessed without incident.  No redness, swelling or bleeding noted. 2x2 and paper tape applied. Instructed patient to call 911 if any worsening symptoms.  Patient off unit via wheelchair in NAD and without any new complaints.   Call back instructions reviewed.  Patient verbalized understanding.

## 2025-07-29 LAB
ALBUMIN SERPL BCP-MCNC: 2.8 G/DL (ref 3.4–5)
ALP SERPL-CCNC: 1167 U/L (ref 33–120)
ALT SERPL W P-5'-P-CCNC: 77 U/L (ref 10–52)
ANION GAP SERPL CALC-SCNC: 16 MMOL/L (ref 10–20)
AST SERPL W P-5'-P-CCNC: 123 U/L (ref 9–39)
BILIRUB SERPL-MCNC: 8.6 MG/DL (ref 0–1.2)
BUN SERPL-MCNC: 26 MG/DL (ref 6–23)
CALCIUM SERPL-MCNC: 7.6 MG/DL (ref 8.6–10.6)
CHLORIDE SERPL-SCNC: 93 MMOL/L (ref 98–107)
CO2 SERPL-SCNC: 24 MMOL/L (ref 21–32)
CREAT SERPL-MCNC: 1.03 MG/DL (ref 0.5–1.3)
EGFRCR SERPLBLD CKD-EPI 2021: 86 ML/MIN/1.73M*2
GLUCOSE SERPL-MCNC: 195 MG/DL (ref 74–99)
POTASSIUM SERPL-SCNC: 4.2 MMOL/L (ref 3.5–5.3)
PROT SERPL-MCNC: 5.7 G/DL (ref 6.4–8.2)
SODIUM SERPL-SCNC: 129 MMOL/L (ref 136–145)

## 2025-07-31 LAB
ATRIAL RATE: 109 BPM
P AXIS: 31 DEGREES
P OFFSET: 202 MS
P ONSET: 149 MS
PR INTERVAL: 136 MS
Q ONSET: 217 MS
QRS COUNT: 18 BEATS
QRS DURATION: 76 MS
QT INTERVAL: 358 MS
QTC CALCULATION(BAZETT): 482 MS
QTC FREDERICIA: 436 MS
R AXIS: -12 DEGREES
T AXIS: 42 DEGREES
T OFFSET: 396 MS
VENTRICULAR RATE: 109 BPM

## 2025-08-14 ENCOUNTER — APPOINTMENT (OUTPATIENT)
Dept: HEMATOLOGY/ONCOLOGY | Facility: HOSPITAL | Age: 55
End: 2025-08-14
Payer: MEDICARE

## 2025-08-14 ENCOUNTER — APPOINTMENT (OUTPATIENT)
Dept: HEMATOLOGY/ONCOLOGY | Facility: CLINIC | Age: 55
End: 2025-08-14
Payer: MEDICARE

## 2025-08-15 ENCOUNTER — APPOINTMENT (OUTPATIENT)
Dept: HEMATOLOGY/ONCOLOGY | Facility: CLINIC | Age: 55
End: 2025-08-15
Payer: MEDICARE

## 2025-11-17 ENCOUNTER — APPOINTMENT (OUTPATIENT)
Dept: OPHTHALMOLOGY | Age: 55
End: 2025-11-17
Payer: COMMERCIAL